# Patient Record
Sex: FEMALE | Race: BLACK OR AFRICAN AMERICAN | Employment: FULL TIME | URBAN - METROPOLITAN AREA
[De-identification: names, ages, dates, MRNs, and addresses within clinical notes are randomized per-mention and may not be internally consistent; named-entity substitution may affect disease eponyms.]

---

## 2017-02-23 ENCOUNTER — HOSPITAL ENCOUNTER (EMERGENCY)
Age: 42
Discharge: HOME | End: 2017-02-23
Attending: STUDENT IN AN ORGANIZED HEALTH CARE EDUCATION/TRAINING PROGRAM
Payer: COMMERCIAL

## 2017-02-23 VITALS
OXYGEN SATURATION: 99 % | TEMPERATURE: 98.2 F | HEART RATE: 99 BPM | RESPIRATION RATE: 18 BRPM | DIASTOLIC BLOOD PRESSURE: 75 MMHG | HEIGHT: 63 IN | SYSTOLIC BLOOD PRESSURE: 129 MMHG | BODY MASS INDEX: 19.14 KG/M2 | WEIGHT: 108 LBS

## 2017-02-23 DIAGNOSIS — K43.9 ABDOMINAL WALL HERNIA: Primary | ICD-10-CM

## 2017-02-23 PROCEDURE — 99282 EMERGENCY DEPT VISIT SF MDM: CPT

## 2017-02-24 NOTE — ED TRIAGE NOTES
Patient has had a mass growing just above her pubic bone for about a year. Saw her Doc when it was smaller, and was told not to worry too much about it as long as wasn't bothering her. Has now gotten bigger and more bothersome. Denies urinary symptoms, states has had some constipation.

## 2017-02-24 NOTE — ED NOTES
Dr. Reinier Fonseca at bedside for ultrasound of left lower abdominal/labia area; assisted by this nurse.

## 2017-02-24 NOTE — ED PROVIDER NOTES
HPI Comments: 39 y.o. female with past medical history significant for DM, GERD, Pneumothorax, Anemia, Exploratory lap for endometriosis who presents from home with chief complaint of mass to labia. Pt reports 1 year hx of edematous mass to left labia which has fluctuated in size. She notes being evaluated by OB/GYN, PCP and General surgery 1 year ago when size was minimal. She was advised to monitor symptoms. Pt states current symptoms more significant with moderate protrusion. She states symptoms can typically be reduced via manual palpation but not within the last couple of days. Pt denies any other acute medical concerns at this time. PCP: Burt Banda MD    Note written by Levy Munoz, as dictated by Travis Guzman MD 9:19 PM    The history is provided by the patient. No  was used. Past Medical History:   Diagnosis Date    Anemia     Anemia     Dysmenorrhea     GERD (gastroesophageal reflux disease)     Spontaneous pneumothorax 3/2013 and 8/2013    recurrent right       Past Surgical History:   Procedure Laterality Date    HX OTHER SURGICAL  8/21/2013    Bronchoscopy, RVATS and talc pleurodesis    HX OTHER SURGICAL  2009    ex lap, abdominal for endometriosis    HX OTHER SURGICAL  3/21/2013    Bronch, RVATS, apical bleb rsxn,parietal pleurectomy    HX OTHER SURGICAL  8/21/2013    Bronchoscopy, RVATS and talc pleurodesis         Family History:   Problem Relation Age of Onset    Hypertension Mother     Diabetes Father     Heart Disease Father        Social History     Social History    Marital status:      Spouse name: N/A    Number of children: N/A    Years of education: N/A     Occupational History    Not on file.      Social History Main Topics    Smoking status: Never Smoker    Smokeless tobacco: Not on file    Alcohol use Yes      Comment: rarely    Drug use: No    Sexual activity: Not on file     Other Topics Concern    Not on file     Social History Narrative         ALLERGIES: Review of patient's allergies indicates no known allergies. Review of Systems   Constitutional: Negative for activity change, diaphoresis, fatigue and fever. HENT: Negative for congestion and sore throat. Eyes: Negative for photophobia and visual disturbance. Respiratory: Negative for chest tightness and shortness of breath. Cardiovascular: Negative for chest pain, palpitations and leg swelling. Gastrointestinal: Negative for abdominal pain, blood in stool, constipation, diarrhea, nausea and vomiting. Genitourinary: Negative for difficulty urinating, dysuria, flank pain, frequency and hematuria.        + Mass to left labia   Musculoskeletal: Negative for back pain. Neurological: Negative for dizziness, syncope, numbness and headaches. All other systems reviewed and are negative. Vitals:    02/23/17 2046   BP: 129/75   Pulse: 99   Resp: 18   Temp: 98.2 °F (36.8 °C)   SpO2: 99%   Weight: 49 kg (108 lb)   Height: 5' 3\" (1.6 m)            Physical Exam   Constitutional: She is oriented to person, place, and time. She appears well-developed and well-nourished. No distress. HENT:   Head: Normocephalic and atraumatic. Nose: Nose normal.   Mouth/Throat: Oropharynx is clear and moist. No oropharyngeal exudate. Eyes: Conjunctivae and EOM are normal. Right eye exhibits no discharge. Left eye exhibits no discharge. No scleral icterus. Neck: Normal range of motion. Neck supple. No JVD present. No tracheal deviation present. No thyromegaly present. Cardiovascular: Normal rate, regular rhythm, normal heart sounds and intact distal pulses. Exam reveals no gallop and no friction rub. No murmur heard. Pulmonary/Chest: Effort normal and breath sounds normal. No stridor. No respiratory distress. She has no wheezes. She has no rales. She exhibits no tenderness. Abdominal: Bowel sounds are normal. She exhibits no distension and no mass. There is no tenderness. There is no rebound. Genitourinary:   Genitourinary Comments:   Bedside Ultrasound  Midline abdominal hernia extending into left labia, reducible   Musculoskeletal: Normal range of motion. She exhibits no edema or tenderness. Lymphadenopathy:     She has no cervical adenopathy. Neurological: She is alert and oriented to person, place, and time. No cranial nerve deficit. Coordination normal.   Skin: Skin is warm and dry. No rash noted. She is not diaphoretic. No erythema. No pallor. Psychiatric: She has a normal mood and affect. Her behavior is normal. Judgment and thought content normal.   Nursing note and vitals reviewed. Note written by Levy Gonzalez, as dictated by Aimee Santacruz MD 9:31 PM    MDM  Number of Diagnoses or Management Options  Abdominal wall hernia:   Diagnosis management comments: Abscess, hernia, labial swelling. 38 y/o female presenting to ED for left labial/pelvic swelling. Bedside US shows abd hernia. Will dc with surgery f/u.     Risk of Complications, Morbidity, and/or Mortality  Presenting problems: moderate  Diagnostic procedures: moderate  Management options: moderate    Patient Progress  Patient progress: stable    ED Course       Procedures

## 2017-02-24 NOTE — DISCHARGE INSTRUCTIONS
We hope that we have addressed all of your medical concerns. The examination and treatment you received in the Emergency Department were for an emergent problem and were not intended as complete care. It is important that you follow up with your healthcare provider(s) for ongoing care. If your symptoms worsen or do not improve as expected, and you are unable to reach your usual health care provider(s), you should return to the Emergency Department. Today's healthcare is undergoing tremendous change, and patient satisfaction surveys are one of the many tools to assess the quality of medical care. You may receive a survey from the [employer] regarding your experience in the Emergency Department. I hope that your experience has been completely positive, particularly the medical care that I provided. As such, please participate in the survey; anything less than excellent does not meet my expectations or intentions. [facility] and [address] [place] [address] participate in nationally recognized quality of care measures. If your blood pressure is greater than 120/80, as reported below, we urge that you seek medical care to address the potential of high blood pressure, commonly known as hypertension. Hypertension can be hereditary or can be caused by certain medical conditions, pain, stress, or \"white coat syndrome. \"       Please make an appointment with your health care provider(s) for follow up of your Emergency Department visit. VITALS:   Patient Vitals for the past 8 hrs:   Temp Pulse Resp BP SpO2   02/23/17 2046 98.2 °F (36.8 °C) 99 18 129/75 99 %          Thank you for allowing us to provide you with medical care today. We realize that you have many choices for your emergency care needs. Please choose us in the future for any continued health care needs. Mary Beth Metz, [address] [place] [address].   Office: [phone]            No results found for this or any previous visit (from the past 24 hour(s)). No results found. Hernia: Care Instructions  Your Care Instructions    A hernia develops when tissue bulges through a weak spot in the wall of your belly. The groin area and the navel are common areas for a hernia. A hernia can also develop near the area of a surgery you had before. Pressure from lifting, straining, or coughing can tear the weak area, causing the hernia to bulge and be painful. If you cannot push a hernia back into place, the tissue may become trapped outside the belly wall. If the hernia gets twisted and loses its blood supply, it will swell and die. This is called a strangulated hernia. It usually causes a lot of pain. It needs treatment right away. Some hernias need to be repaired to prevent a strangulated hernia. If your hernia causes symptoms or is large, you may need surgery. Follow-up care is a key part of your treatment and safety. Be sure to make and go to all appointments, and call your doctor if you are having problems. Its also a good idea to know your test results and keep a list of the medicines you take. How can you care for yourself at home? · Take care when lifting heavy objects. · Stay at a healthy weight. · Do not smoke. Smoking can cause coughing, which can cause your hernia to bulge. If you need help quitting, talk to your doctor about stop-smoking programs and medicines. These can increase your chances of quitting for good. · Talk with your doctor before wearing a corset or truss for a hernia. These devices are not recommended for treating hernias and sometimes can do more harm than good. There may be certain situations when your doctor thinks a truss would work, but these are rare. When should you call for help? Call your doctor now or seek immediate medical care if:  · You have severe belly pain. · You have nausea or vomiting.   · You have belly pain and are not passing gas or stool. · You cannot push the hernia back into place with gentle pressure when you are lying down. · The skin over the hernia turns red or becomes tender. Watch closely for changes in your health, and be sure to contact your doctor if:  · You have new or increased pain. · You do not get better as expected. Where can you learn more? Go to http://justyna-octavio.info/. Enter C129 in the search box to learn more about \"Hernia: Care Instructions. \"  Current as of: August 9, 2016  Content Version: 11.1  © 7594-4669 [employer]. Care instructions adapted under license by [employer] (which disclaims liability or warranty for this information). If you have questions about a medical condition or this instruction, always ask your healthcare professional. [address] any warranty or liability for your use of this information.

## 2017-03-02 ENCOUNTER — OFFICE VISIT (OUTPATIENT)
Dept: SURGERY | Age: 42
End: 2017-03-02

## 2017-03-02 VITALS
HEART RATE: 86 BPM | WEIGHT: 105 LBS | RESPIRATION RATE: 14 BRPM | OXYGEN SATURATION: 98 % | TEMPERATURE: 98.5 F | SYSTOLIC BLOOD PRESSURE: 99 MMHG | DIASTOLIC BLOOD PRESSURE: 69 MMHG | HEIGHT: 63 IN | BODY MASS INDEX: 18.61 KG/M2

## 2017-03-02 DIAGNOSIS — K40.90 LEFT INGUINAL HERNIA: Primary | ICD-10-CM

## 2017-03-02 RX ORDER — CITALOPRAM 10 MG/1
10 TABLET ORAL
COMMUNITY
End: 2018-09-26

## 2017-03-02 NOTE — PROGRESS NOTES
Surgery Consult    Subjective:     Darlin Sheldon is a 39 y.o.  female who was referred for evaluation of  left inguinal hernia. Symptoms were first noted 3 years ago. Hernia has always been small and asymptomatic. Starting two weeks ago, it has become large and painful. Pain is dull and rated as moderate in intensity. Lump is reducible. Patient does not have symptoms of chronic constipation, chronic cough, difficulty urinating. Patient does not have a history of previous hernia surgery. There are no active problems to display for this patient. Past Medical History:   Diagnosis Date    Anemia     Anemia     Dysmenorrhea     GERD (gastroesophageal reflux disease)     Spontaneous pneumothorax 3/2013 and 8/2013    recurrent right     Past Surgical History:   Procedure Laterality Date    HX OTHER SURGICAL  8/21/2013    Bronchoscopy, RVATS and talc pleurodesis    HX OTHER SURGICAL  2009    ex lap, abdominal for endometriosis    HX OTHER SURGICAL  3/21/2013    Bronch, RVATS, apical bleb rsxn,parietal pleurectomy    HX OTHER SURGICAL  8/21/2013    Bronchoscopy, RVATS and talc pleurodesis      Family History   Problem Relation Age of Onset    Hypertension Mother     Diabetes Father     Heart Disease Father       Social History   Substance Use Topics    Smoking status: Never Smoker    Smokeless tobacco: Not on file    Alcohol use Yes      Comment: rarely      Current Outpatient Prescriptions   Medication Sig    citalopram (CELEXA) 10 mg tablet Take  by mouth daily.  insulin detemir (LEVEMIR FLEXPEN) 100 unit/mL (3 mL) inpn 22 Units by SubCUTAneous route Daily (before breakfast).  ferrous sulfate 325 mg (65 mg iron) CpER Take 325 mg by mouth two (2) times a day.  tranexamic acid (LYSTEDA) 650 mg tab tablet Take 13,500 mg by mouth three (3) times daily as needed for Other (patient takes three times daily for 5 days during menses).     LORazepam (ATIVAN) 1 mg tablet Take 1 mg by mouth three (3) times daily as needed for Anxiety.  metFORMIN (GLUCOPHAGE) 500 mg tablet Take 1 Tab by mouth two (2) times daily (with meals). (Patient taking differently: Take 1,000 mg by mouth two (2) times daily (with meals). )     No current facility-administered medications for this visit. No Known Allergies     Review of Systems:  A comprehensive review of systems was negative except for that written in the History of Present Illness. Objective:     Blood pressure 99/69, pulse 86, temperature 98.5 °F (36.9 °C), temperature source Oral, resp. rate 14, height 5' 3\" (1.6 m), weight 105 lb (47.6 kg), last menstrual period 02/19/2017, SpO2 98 %. Physical Exam:  GENERAL: alert, cooperative, no distress, appears stated age, EYE: conjunctivae/corneas clear. , EOM's intact., LUNG: clear to auscultation bilaterally, HEART: regular rate and rhythm, S1, S2 normal, no murmur, click, rub or gallop, ABDOMEN: soft, non-tender. Bowel sounds normal. No masses,  no organomegaly, : large left inguinal hernia extending into the left external labia. It is reducible. No RIH. NEUROLOGIC: AOx3. Gait normal.  motor strength normal and symmetric.  sensation grossly intact. Assessment:     Left inguinal hernia, easily reducible. Plan:     1. Discussed possibility of incarceration, strangulation, enlargement in size over time, and the risk of emergency surgery in the face of strangulation. Also discussed the risk of surgery including recurrence which can be up to 50% in the case of incisional or complex hernias, use of prosthetic materials (mesh) and the increased risk of infection, postoperative infection and the possible need for reoperation and removal of mesh if used, possibility of postoperative small bowel obstruction or ileus, and the risks of general anesthetic. The patient understands the risks; any and all questions were answered to the patient's satisfaction.      2. Patient has elected to proceed with surgical treatment. Procedure will be scheduled. Signed By: Linda Simmons MD     March 3, 2017                   Surgery : laparoscopic left inguinal hernia repair with mesh, possible right, possible open    Length:  1 hours    Diagnosis :     ICD-10-CM ICD-9-CM   1.  Left inguinal hernia K40.90 550.90       Anesthesia : general anesthesia    Surgery Type: Outpatient    Position:  supine    Hospital : [facility]    Blood thinner NO

## 2017-03-02 NOTE — PROGRESS NOTES
1. Have you been to the ER, urgent care clinic since your last visit? Hospitalized since your last visit? yes, [facility] ED 2/23/17    2. Have you seen or consulted any other health care providers outside of the [address] [place] [place] [address] since your last visit? Include any pap smears or colon screening.  no

## 2017-03-09 ENCOUNTER — HOSPITAL ENCOUNTER (OUTPATIENT)
Dept: GENERAL RADIOLOGY | Age: 42
Discharge: HOME | End: 2017-03-09
Attending: SURGERY
Payer: COMMERCIAL

## 2017-03-09 ENCOUNTER — HOSPITAL ENCOUNTER (OUTPATIENT)
Dept: PREADMISSION TESTING | Age: 42
Discharge: HOME | End: 2017-03-09
Payer: COMMERCIAL

## 2017-03-09 VITALS
OXYGEN SATURATION: 100 % | DIASTOLIC BLOOD PRESSURE: 72 MMHG | BODY MASS INDEX: 19.26 KG/M2 | RESPIRATION RATE: 16 BRPM | SYSTOLIC BLOOD PRESSURE: 117 MMHG | HEART RATE: 87 BPM | HEIGHT: 63 IN | TEMPERATURE: 98 F | WEIGHT: 108.69 LBS

## 2017-03-09 LAB
ALBUMIN SERPL BCP-MCNC: 3.8 G/DL (ref 3.5–5)
ALBUMIN/GLOB SERPL: 1.1 {RATIO} (ref 1.1–2.2)
ALP SERPL-CCNC: 100 U/L (ref 45–117)
ALT SERPL-CCNC: 32 U/L (ref 12–78)
ANION GAP BLD CALC-SCNC: 7 MMOL/L (ref 5–15)
AST SERPL W P-5'-P-CCNC: 19 U/L (ref 15–37)
ATRIAL RATE: 70 BPM
BASOPHILS # BLD AUTO: 0.1 K/UL (ref 0–0.1)
BASOPHILS # BLD: 1 % (ref 0–1)
BILIRUB SERPL-MCNC: 0.2 MG/DL (ref 0.2–1)
BUN SERPL-MCNC: 10 MG/DL (ref 6–20)
BUN/CREAT SERPL: 20 (ref 12–20)
CALCIUM SERPL-MCNC: 9.4 MG/DL (ref 8.5–10.1)
CALCULATED P AXIS, ECG09: 59 DEGREES
CALCULATED R AXIS, ECG10: 60 DEGREES
CALCULATED T AXIS, ECG11: 23 DEGREES
CHLORIDE SERPL-SCNC: 102 MMOL/L (ref 97–108)
CO2 SERPL-SCNC: 29 MMOL/L (ref 21–32)
CREAT SERPL-MCNC: 0.51 MG/DL (ref 0.55–1.02)
DIAGNOSIS, 93000: NORMAL
EOSINOPHIL # BLD: 0.1 K/UL (ref 0–0.4)
EOSINOPHIL NFR BLD: 1 % (ref 0–7)
ERYTHROCYTE [DISTWIDTH] IN BLOOD BY AUTOMATED COUNT: 20.9 % (ref 11.5–14.5)
GLOBULIN SER CALC-MCNC: 3.6 G/DL (ref 2–4)
GLUCOSE SERPL-MCNC: 212 MG/DL (ref 65–100)
HCT VFR BLD AUTO: 35.5 % (ref 35–47)
HGB BLD-MCNC: 10 G/DL (ref 11.5–16)
LYMPHOCYTES # BLD AUTO: 21 % (ref 12–49)
LYMPHOCYTES # BLD: 1.3 K/UL (ref 0.8–3.5)
MCH RBC QN AUTO: 21.5 PG (ref 26–34)
MCHC RBC AUTO-ENTMCNC: 28.2 G/DL (ref 30–36.5)
MCV RBC AUTO: 76.2 FL (ref 80–99)
MONOCYTES # BLD: 0.4 K/UL (ref 0–1)
MONOCYTES NFR BLD AUTO: 6 % (ref 5–13)
NEUTS SEG # BLD: 4.3 K/UL (ref 1.8–8)
NEUTS SEG NFR BLD AUTO: 71 % (ref 32–75)
P-R INTERVAL, ECG05: 138 MS
PLATELET # BLD AUTO: 384 K/UL (ref 150–400)
POTASSIUM SERPL-SCNC: 4.2 MMOL/L (ref 3.5–5.1)
PROT SERPL-MCNC: 7.4 G/DL (ref 6.4–8.2)
Q-T INTERVAL, ECG07: 360 MS
QRS DURATION, ECG06: 74 MS
QTC CALCULATION (BEZET), ECG08: 388 MS
RBC # BLD AUTO: 4.66 M/UL (ref 3.8–5.2)
RBC MORPH BLD: ABNORMAL
SODIUM SERPL-SCNC: 138 MMOL/L (ref 136–145)
VENTRICULAR RATE, ECG03: 70 BPM
WBC # BLD AUTO: 6.2 K/UL (ref 3.6–11)

## 2017-03-09 PROCEDURE — 85025 COMPLETE CBC W/AUTO DIFF WBC: CPT | Performed by: SURGERY

## 2017-03-09 PROCEDURE — 71020 XR CHEST PA LAT: CPT

## 2017-03-09 PROCEDURE — 93005 ELECTROCARDIOGRAM TRACING: CPT

## 2017-03-09 PROCEDURE — 36415 COLL VENOUS BLD VENIPUNCTURE: CPT | Performed by: SURGERY

## 2017-03-09 PROCEDURE — 80053 COMPREHEN METABOLIC PANEL: CPT | Performed by: SURGERY

## 2017-03-09 RX ORDER — INSULIN ASPART 100 [IU]/ML
INJECTION, SOLUTION INTRAVENOUS; SUBCUTANEOUS
COMMUNITY

## 2017-03-09 RX ORDER — OMEPRAZOLE 20 MG/1
20 CAPSULE, DELAYED RELEASE ORAL
COMMUNITY

## 2017-03-09 RX ORDER — INSULIN DEGLUDEC 100 U/ML
32 INJECTION, SOLUTION SUBCUTANEOUS
COMMUNITY

## 2017-03-09 NOTE — PERIOP NOTES
[facility]  PREOPERATIVE INSTRUCTIONS    Surgery Date:   3/15/2017  Surgery arrival time given by surgeon: NO   If no,[facility] [address] staff will call you between 4 PM- 8 PM the day before surgery with your arrival time. If your surgery is on a Monday, we will call you the preceding Friday. Please call [phone] after 8 PM if you did not receive your arrival time. 1. Please report at the designated time to the [facility] [address] [facility]. Bring your insurance card, photo identification, and any copayment ( if applicable). 2. You must have a responsible adult to drive you home. You need to have a responsible adult to stay with you the first 24 hours after surgery if you are going home the same day of your surgery and you should not drive a car for 24 hours following your surgery. 3. Nothing to eat or drink after midnight the night before surgery. This includes no water, gum, mints, coffee, juice, etc.  Please note special instructions, if applicable, below for medications. 4. MEDICATIONS TO TAKE THE MORNING OF SURGERY WITH A SIP OF WATER: Take citalopram and omeprazole, NO meds for diabetes  5. No alcoholic beverages 24 hours before or after your surgery. 6. If you are being admitted to the hospital,please leave personal belongings/luggage in your car until you have an assigned hospital room number. 7. Stop Aspirin and/or any non-steroidal anti-inflammatory drugs (i.e. Ibuprofen, Naproxen, Advil, Aleve) as directed by your surgeon. You may take Tylenol. Stop herbal supplements 1 week prior to  surgery. 8. If you are currently taking Plavix, Coumadin,or any other blood-thinning/anticoagulant medication contact your surgeon for instructions. 9. Please wear comfortable clothes. Wear your glasses instead of contacts. We ask that all money, jewelry and valuables be left at home. Wear no make up, particularly mascara, the day of surgery.    10.  All body piercings, rings,and jewelry need to be removed and left at home.    Please wear your hair loose or down. Please no pony-tails, buns, or any metal hair accessories. If you shower the morning of surgery, please do not apply any lotions, powders, or deodorants afterwards. Do not shave any body area within 24 hours of your surgery. 11. Please follow all instructions to avoid any potential surgical cancellation. 12.  Should your physical condition change, (i.e. fever, cold, flu, etc.) please notify your surgeon as soon as possible. 13. It is important to be on time. If a situation occurs where you may be delayed, please call:  [phone] / [phone] on the day of surgery. 14. The [facility] staff can be reached at 21 [phone]. .  15. Special instructions: none    The patient was contacted  in person. She  verbalize  understanding of all instructions does not  need reinforcement.

## 2017-03-10 ENCOUNTER — TELEPHONE (OUTPATIENT)
Dept: SURGERY | Age: 42
End: 2017-03-10

## 2017-03-10 NOTE — TELEPHONE ENCOUNTER
Scheduled appointment for patient on Tuesday, March 14 @ 10am with Dr Efrem Almonte in [address] patient received all info needed for appointment did not voice any other concerns

## 2017-03-10 NOTE — TELEPHONE ENCOUNTER
Called patient to explain to her she needs cardiac clearance to have surgery.  Left message on voicemail

## 2017-03-14 ENCOUNTER — ANESTHESIA EVENT (OUTPATIENT)
Dept: SURGERY | Age: 42
End: 2017-03-14
Payer: COMMERCIAL

## 2017-03-15 ENCOUNTER — HOSPITAL ENCOUNTER (OUTPATIENT)
Age: 42
Setting detail: OUTPATIENT SURGERY
Discharge: HOME | End: 2017-03-15
Attending: SURGERY | Admitting: SURGERY
Payer: COMMERCIAL

## 2017-03-15 ENCOUNTER — SURGERY (OUTPATIENT)
Age: 42
End: 2017-03-15

## 2017-03-15 ENCOUNTER — ANESTHESIA (OUTPATIENT)
Dept: SURGERY | Age: 42
End: 2017-03-15
Payer: COMMERCIAL

## 2017-03-15 VITALS
OXYGEN SATURATION: 99 % | SYSTOLIC BLOOD PRESSURE: 123 MMHG | TEMPERATURE: 98.2 F | HEART RATE: 94 BPM | WEIGHT: 108.69 LBS | HEIGHT: 63 IN | DIASTOLIC BLOOD PRESSURE: 77 MMHG | BODY MASS INDEX: 19.26 KG/M2 | RESPIRATION RATE: 14 BRPM

## 2017-03-15 LAB
GLUCOSE BLD STRIP.AUTO-MCNC: 78 MG/DL (ref 65–100)
GLUCOSE BLD STRIP.AUTO-MCNC: 81 MG/DL (ref 65–100)
GLUCOSE BLD STRIP.AUTO-MCNC: 85 MG/DL (ref 65–100)
HCG UR QL: NEGATIVE
SERVICE CMNT-IMP: NORMAL

## 2017-03-15 PROCEDURE — 77030002895 HC DEV VASC CLOSR COVD -B: Performed by: SURGERY

## 2017-03-15 PROCEDURE — 76060000034 HC ANESTHESIA 1.5 TO 2 HR: Performed by: SURGERY

## 2017-03-15 PROCEDURE — 77030018836 HC SOL IRR NACL ICUM -A: Performed by: SURGERY

## 2017-03-15 PROCEDURE — 77030020747 HC TU INSUF ENDOSC TELE -A: Performed by: SURGERY

## 2017-03-15 PROCEDURE — 77030031139 HC SUT VCRL2 J&J -A: Performed by: SURGERY

## 2017-03-15 PROCEDURE — 77030020782 HC GWN BAIR PAWS FLX 3M -B

## 2017-03-15 PROCEDURE — 74011250636 HC RX REV CODE- 250/636: Performed by: SURGERY

## 2017-03-15 PROCEDURE — 77030037032 HC INSRT SCIS CLICKLLINE DISP STOR -B: Performed by: SURGERY

## 2017-03-15 PROCEDURE — 77030019908 HC STETH ESOPH SIMS -A: Performed by: ANESTHESIOLOGY

## 2017-03-15 PROCEDURE — 77030011640 HC PAD GRND REM COVD -A: Performed by: SURGERY

## 2017-03-15 PROCEDURE — 77030034850: Performed by: SURGERY

## 2017-03-15 PROCEDURE — 74011250636 HC RX REV CODE- 250/636

## 2017-03-15 PROCEDURE — 77030018684: Performed by: SURGERY

## 2017-03-15 PROCEDURE — 77030035043 HC TRCR ENDOSC OPTCL BLDLSS COVD -B: Performed by: SURGERY

## 2017-03-15 PROCEDURE — 77030013079 HC BLNKT BAIR HGGR 3M -A: Performed by: ANESTHESIOLOGY

## 2017-03-15 PROCEDURE — 74011000250 HC RX REV CODE- 250

## 2017-03-15 PROCEDURE — 76010000153 HC OR TIME 1.5 TO 2 HR: Performed by: SURGERY

## 2017-03-15 PROCEDURE — 77030008684 HC TU ET CUF COVD -B: Performed by: ANESTHESIOLOGY

## 2017-03-15 PROCEDURE — 76210000021 HC REC RM PH II 0.5 TO 1 HR: Performed by: SURGERY

## 2017-03-15 PROCEDURE — 77030026438 HC STYL ET INTUB CARD -A: Performed by: ANESTHESIOLOGY

## 2017-03-15 PROCEDURE — 77030032490 HC SLV COMPR SCD KNE COVD -B: Performed by: SURGERY

## 2017-03-15 PROCEDURE — 81025 URINE PREGNANCY TEST: CPT

## 2017-03-15 PROCEDURE — 82962 GLUCOSE BLOOD TEST: CPT

## 2017-03-15 PROCEDURE — 74011000250 HC RX REV CODE- 250: Performed by: SURGERY

## 2017-03-15 PROCEDURE — 77030002933 HC SUT MCRYL J&J -A: Performed by: SURGERY

## 2017-03-15 PROCEDURE — 74011250636 HC RX REV CODE- 250/636: Performed by: ANESTHESIOLOGY

## 2017-03-15 PROCEDURE — 76210000006 HC OR PH I REC 0.5 TO 1 HR: Performed by: SURGERY

## 2017-03-15 PROCEDURE — 77030010507 HC ADH SKN DERMBND J&J -B: Performed by: SURGERY

## 2017-03-15 PROCEDURE — C1781 MESH (IMPLANTABLE): HCPCS | Performed by: SURGERY

## 2017-03-15 DEVICE — MESH SURG W4XL6IN ELLIPSE SEPRA TECHNOLOGY VENTRALIGHT: Type: IMPLANTABLE DEVICE | Site: GROIN | Status: FUNCTIONAL

## 2017-03-15 RX ORDER — LIDOCAINE HYDROCHLORIDE 10 MG/ML
0.1 INJECTION, SOLUTION EPIDURAL; INFILTRATION; INTRACAUDAL; PERINEURAL AS NEEDED
Status: DISCONTINUED | OUTPATIENT
Start: 2017-03-15 | End: 2017-03-15 | Stop reason: HOSPADM

## 2017-03-15 RX ORDER — SODIUM CHLORIDE, SODIUM LACTATE, POTASSIUM CHLORIDE, CALCIUM CHLORIDE 600; 310; 30; 20 MG/100ML; MG/100ML; MG/100ML; MG/100ML
INJECTION, SOLUTION INTRAVENOUS
Status: DISCONTINUED | OUTPATIENT
Start: 2017-03-15 | End: 2017-03-15 | Stop reason: HOSPADM

## 2017-03-15 RX ORDER — SODIUM CHLORIDE 0.9 % (FLUSH) 0.9 %
5-10 SYRINGE (ML) INJECTION AS NEEDED
Status: DISCONTINUED | OUTPATIENT
Start: 2017-03-15 | End: 2017-03-15 | Stop reason: HOSPADM

## 2017-03-15 RX ORDER — HYDROMORPHONE HYDROCHLORIDE 1 MG/ML
.25-1 INJECTION, SOLUTION INTRAMUSCULAR; INTRAVENOUS; SUBCUTANEOUS
Status: DISCONTINUED | OUTPATIENT
Start: 2017-03-15 | End: 2017-03-15 | Stop reason: HOSPADM

## 2017-03-15 RX ORDER — SODIUM CHLORIDE, SODIUM LACTATE, POTASSIUM CHLORIDE, CALCIUM CHLORIDE 600; 310; 30; 20 MG/100ML; MG/100ML; MG/100ML; MG/100ML
100 INJECTION, SOLUTION INTRAVENOUS CONTINUOUS
Status: DISCONTINUED | OUTPATIENT
Start: 2017-03-15 | End: 2017-03-15 | Stop reason: HOSPADM

## 2017-03-15 RX ORDER — OXYCODONE AND ACETAMINOPHEN 5; 325 MG/1; MG/1
TABLET ORAL
Qty: 45 TAB | Refills: 0 | Status: SHIPPED | OUTPATIENT
Start: 2017-03-15 | End: 2019-01-10

## 2017-03-15 RX ORDER — LIDOCAINE HYDROCHLORIDE 20 MG/ML
INJECTION, SOLUTION EPIDURAL; INFILTRATION; INTRACAUDAL; PERINEURAL AS NEEDED
Status: DISCONTINUED | OUTPATIENT
Start: 2017-03-15 | End: 2017-03-15 | Stop reason: HOSPADM

## 2017-03-15 RX ORDER — GLYCOPYRROLATE 0.2 MG/ML
INJECTION INTRAMUSCULAR; INTRAVENOUS AS NEEDED
Status: DISCONTINUED | OUTPATIENT
Start: 2017-03-15 | End: 2017-03-15 | Stop reason: HOSPADM

## 2017-03-15 RX ORDER — ROCURONIUM BROMIDE 10 MG/ML
INJECTION, SOLUTION INTRAVENOUS AS NEEDED
Status: DISCONTINUED | OUTPATIENT
Start: 2017-03-15 | End: 2017-03-15 | Stop reason: HOSPADM

## 2017-03-15 RX ORDER — SODIUM CHLORIDE 0.9 % (FLUSH) 0.9 %
5-10 SYRINGE (ML) INJECTION EVERY 8 HOURS
Status: DISCONTINUED | OUTPATIENT
Start: 2017-03-15 | End: 2017-03-15 | Stop reason: HOSPADM

## 2017-03-15 RX ORDER — PROPOFOL 10 MG/ML
INJECTION, EMULSION INTRAVENOUS AS NEEDED
Status: DISCONTINUED | OUTPATIENT
Start: 2017-03-15 | End: 2017-03-15 | Stop reason: HOSPADM

## 2017-03-15 RX ORDER — MIDAZOLAM HYDROCHLORIDE 1 MG/ML
INJECTION, SOLUTION INTRAMUSCULAR; INTRAVENOUS AS NEEDED
Status: DISCONTINUED | OUTPATIENT
Start: 2017-03-15 | End: 2017-03-15 | Stop reason: HOSPADM

## 2017-03-15 RX ORDER — NEOSTIGMINE METHYLSULFATE 1 MG/ML
INJECTION INTRAVENOUS AS NEEDED
Status: DISCONTINUED | OUTPATIENT
Start: 2017-03-15 | End: 2017-03-15 | Stop reason: HOSPADM

## 2017-03-15 RX ORDER — CEFAZOLIN SODIUM IN 0.9 % NACL 2 G/50 ML
2 INTRAVENOUS SOLUTION, PIGGYBACK (ML) INTRAVENOUS
Status: COMPLETED | OUTPATIENT
Start: 2017-03-15 | End: 2017-03-15

## 2017-03-15 RX ORDER — BUPIVACAINE HYDROCHLORIDE AND EPINEPHRINE 5; 5 MG/ML; UG/ML
INJECTION, SOLUTION EPIDURAL; INTRACAUDAL; PERINEURAL AS NEEDED
Status: DISCONTINUED | OUTPATIENT
Start: 2017-03-15 | End: 2017-03-15 | Stop reason: HOSPADM

## 2017-03-15 RX ORDER — FENTANYL CITRATE 50 UG/ML
INJECTION, SOLUTION INTRAMUSCULAR; INTRAVENOUS AS NEEDED
Status: DISCONTINUED | OUTPATIENT
Start: 2017-03-15 | End: 2017-03-15 | Stop reason: HOSPADM

## 2017-03-15 RX ORDER — ONDANSETRON 2 MG/ML
INJECTION INTRAMUSCULAR; INTRAVENOUS AS NEEDED
Status: DISCONTINUED | OUTPATIENT
Start: 2017-03-15 | End: 2017-03-15 | Stop reason: HOSPADM

## 2017-03-15 RX ORDER — SUCCINYLCHOLINE CHLORIDE 20 MG/ML
INJECTION INTRAMUSCULAR; INTRAVENOUS AS NEEDED
Status: DISCONTINUED | OUTPATIENT
Start: 2017-03-15 | End: 2017-03-15 | Stop reason: HOSPADM

## 2017-03-15 RX ORDER — CEFAZOLIN SODIUM IN 0.9 % NACL 2 G/50 ML
2 INTRAVENOUS SOLUTION, PIGGYBACK (ML) INTRAVENOUS ONCE
Status: DISCONTINUED | OUTPATIENT
Start: 2017-03-15 | End: 2017-03-15

## 2017-03-15 RX ADMIN — FENTANYL CITRATE 100 MCG: 50 INJECTION, SOLUTION INTRAMUSCULAR; INTRAVENOUS at 11:24

## 2017-03-15 RX ADMIN — BUPIVACAINE HYDROCHLORIDE AND EPINEPHRINE 7 ML: 5; 5 INJECTION, SOLUTION EPIDURAL; INTRACAUDAL; PERINEURAL at 12:38

## 2017-03-15 RX ADMIN — PROPOFOL 130 MG: 10 INJECTION, EMULSION INTRAVENOUS at 11:24

## 2017-03-15 RX ADMIN — ROCURONIUM BROMIDE 20 MG: 10 INJECTION, SOLUTION INTRAVENOUS at 11:31

## 2017-03-15 RX ADMIN — ROCURONIUM BROMIDE 10 MG: 10 INJECTION, SOLUTION INTRAVENOUS at 11:53

## 2017-03-15 RX ADMIN — NEOSTIGMINE METHYLSULFATE 2 MG: 1 INJECTION INTRAVENOUS at 12:41

## 2017-03-15 RX ADMIN — SUCCINYLCHOLINE CHLORIDE 60 MG: 20 INJECTION INTRAMUSCULAR; INTRAVENOUS at 11:24

## 2017-03-15 RX ADMIN — LIDOCAINE HYDROCHLORIDE 40 MG: 20 INJECTION, SOLUTION EPIDURAL; INFILTRATION; INTRACAUDAL; PERINEURAL at 11:24

## 2017-03-15 RX ADMIN — FENTANYL CITRATE 50 MCG: 50 INJECTION, SOLUTION INTRAMUSCULAR; INTRAVENOUS at 11:20

## 2017-03-15 RX ADMIN — ONDANSETRON 4 MG: 2 INJECTION INTRAMUSCULAR; INTRAVENOUS at 12:41

## 2017-03-15 RX ADMIN — SODIUM CHLORIDE, SODIUM LACTATE, POTASSIUM CHLORIDE, AND CALCIUM CHLORIDE 100 ML/HR: 600; 310; 30; 20 INJECTION, SOLUTION INTRAVENOUS at 09:48

## 2017-03-15 RX ADMIN — GLYCOPYRROLATE 0.3 MG: 0.2 INJECTION INTRAMUSCULAR; INTRAVENOUS at 12:41

## 2017-03-15 RX ADMIN — MIDAZOLAM HYDROCHLORIDE 2 MG: 1 INJECTION, SOLUTION INTRAMUSCULAR; INTRAVENOUS at 11:20

## 2017-03-15 RX ADMIN — SODIUM CHLORIDE, SODIUM LACTATE, POTASSIUM CHLORIDE, CALCIUM CHLORIDE: 600; 310; 30; 20 INJECTION, SOLUTION INTRAVENOUS at 11:27

## 2017-03-15 RX ADMIN — CEFAZOLIN 2 G: 1 INJECTION, POWDER, FOR SOLUTION INTRAMUSCULAR; INTRAVENOUS; PARENTERAL at 11:25

## 2017-03-15 RX ADMIN — ROCURONIUM BROMIDE 5 MG: 10 INJECTION, SOLUTION INTRAVENOUS at 11:24

## 2017-03-15 RX ADMIN — ROCURONIUM BROMIDE 10 MG: 10 INJECTION, SOLUTION INTRAVENOUS at 12:15

## 2017-03-15 NOTE — ANESTHESIA PREPROCEDURE EVALUATION
Anesthetic History   No history of anesthetic complications            Review of Systems / Medical History  Patient summary reviewed, nursing notes reviewed and pertinent labs reviewed    Pulmonary  Within defined limits                 Neuro/Psych   Within defined limits           Cardiovascular  Within defined limits                     GI/Hepatic/Renal     GERD           Endo/Other    Diabetes         Other Findings              Physical Exam    Airway  Mallampati: II  TM Distance: 4 - 6 cm  Neck ROM: normal range of motion   Mouth opening: Normal     Cardiovascular    Rhythm: regular  Rate: normal         Dental  No notable dental hx       Pulmonary  Breath sounds clear to auscultation               Abdominal         Other Findings            Anesthetic Plan    ASA: 2  Anesthesia type: MAC            Anesthetic plan and risks discussed with: Patient

## 2017-03-15 NOTE — DISCHARGE INSTRUCTIONS
Hernia Repair: What to Expect at [address] are likely to have pain for the next few days. You may also feel like you have the flu, and you may have a low fever and feel tired and nauseated. This is common. You should feel better after a few days and will probably feel much better in 7 days. For several weeks you may feel twinges or pulling in the hernia repair when you move. You may have some bruising on the scrotum and along the penis. This is normal. Men will need to wear a jockstrap or briefs, not boxers, for scrotal support for several days after a groin (inguinal) hernia repair. [employer] bicycle shorts may provide good support. This care sheet gives you a general idea about how long it will take for you to recover. But each person recovers at a different pace. Follow the steps below to get better as quickly as possible. How can you care for yourself at home? Activity  · Rest when you feel tired. Getting enough sleep will help you recover. · Try to walk each day. Start by walking a little more than you did the day before. Bit by bit, increase the amount you walk. Walking boosts blood flow and helps prevent pneumonia and constipation. · Avoid strenuous activities, such as biking, jogging, weight lifting, or aerobic exercise, until your doctor says it is okay. · Avoid lifting anything that would make you strain. This may include heavy grocery bags and milk containers, a heavy briefcase or backpack, cat litter or dog food bags, a vacuum , or a child. · You may drive when you are no longer taking pain medicine and can quickly move your foot from the gas pedal to the brake. You must also be able to sit comfortably for a long period of time, even if you do not plan to go far. You might get caught in traffic. · Most people are able to return to work within 1 to 2 weeks after surgery. · You may shower 24 to 48 hours after surgery, if your doctor okays it. Pat the cut (incision) dry. Do not take a bath for the first 2 weeks, or until your doctor tells you it is okay. · Your doctor will tell you when you can have sex again. Diet  · You can eat your normal diet. If your stomach is upset, try bland, low-fat foods like plain rice, broiled chicken, toast, and yogurt. · Drink plenty of fluids (unless your doctor tells you not to). · You may notice that your bowel movements are not regular right after your surgery. This is common. Avoid constipation and straining with bowel movements. You may want to take a fiber supplement every day. If you have not had a bowel movement after a couple of days, ask your doctor about taking a mild laxative. Medicines  · Your doctor will tell you if and when you can restart your medicines. He or she will also give you instructions about taking any new medicines. · If you take blood thinners, such as warfarin (Coumadin), clopidogrel (Plavix), or aspirin, be sure to talk to your doctor. He or she will tell you if and when to start taking those medicines again. Make sure that you understand exactly what your doctor wants you to do. · Be safe with medicines. Take pain medicines exactly as directed. ¨ If the doctor gave you a prescription medicine for pain, take it as prescribed. ¨ If you are not taking a prescription pain medicine, take an over-the-counter medicine such as acetaminophen (Tylenol), ibuprofen (Advil, Motrin), or naproxen (Aleve). Read and follow all instructions on the label. ¨ Do not take two or more pain medicines at the same time unless the doctor told you to. Many pain medicines have acetaminophen, which is Tylenol. Too much acetaminophen (Tylenol) can be harmful. · If your doctor prescribed antibiotics, take them as directed. Do not stop taking them just because you feel better. You need to take the full course of antibiotics.   · If you think your pain medicine is making you sick to your stomach:  ¨ Take your medicine after meals (unless your doctor has told you not to). ¨ Ask your doctor for a different pain medicine. Incision care  · If you have strips of tape on the cut (incision) the doctor made, leave the tape on for a week or until it falls off. · If you have staples closing the cut, you will need to visit your doctor in 1 to 2 weeks to have them removed. · Wash the area daily with warm, soapy water and pat it dry. Follow-up care is a key part of your treatment and safety. Be sure to make and go to all appointments, and call your doctor if you are having problems. It's also a good idea to know your test results and keep a list of the medicines you take. When should you call for help? Call 911 anytime you think you may need emergency care. For example, call if:  · You passed out (lost consciousness). · You have sudden chest pain and shortness of breath, or you cough up blood. · You have severe pain in your belly. Call your doctor now or seek immediate medical care if:  · You are sick to your stomach and cannot keep fluids down. · You have signs of a blood clot, such as:  ¨ Pain in your calf, back of the knee, thigh, or groin. ¨ Redness and swelling in your leg or groin. · You have trouble passing urine or stool, especially if you have mild pain or swelling in your lower belly. · Bright red blood has soaked through the bandage over your incision. Watch closely for any changes in your health, and be sure to contact your doctor if:  · Your swelling is getting worse. · Your swelling is not going down. Where can you learn more? Go to http://justyna-octavio.info/. Enter F288 in the search box to learn more about \"Hernia Repair: What to Expect at Home. \"  Current as of: August 9, 2016  Content Version: 11.1  © 1602-3173 [employer]. Care instructions adapted under license by [employer] (which disclaims liability or warranty for this information).  If you have questions about a medical condition or this instruction, always ask your healthcare professional. [place] [address] any warranty or liability for your use of this information. Sissy Ogden MD, PhD, FACS  General Surgery at [address] [place] [address], [facility] [address]  [phone]  Fax [phone]    DISCHARGE SUMMARY from your Nurse    The following personal items collected during your admission are returned to you:   Dental Appliance: Dental Appliances: None  Vision: Visual Aid: Glasses  Hearing Aid:    Jewelry: Jewelry: Earrings, Liban Hoist  Clothing: Clothing: Footwear, Jacket/Coat, Pants, Shirt, Socks, Undergarments  Other Valuables: Other Valuables: Eyeglasses (placed inpateint belonging bag)  Valuables sent to safe:      PATIENT INSTRUCTIONS:    After general anesthesia or intravenous sedation, for 24 hours or while taking prescription Narcotics:  · Limit your activities  · Do not drive and operate hazardous machinery  · Do not make important personal or business decisions  · Do  not drink alcoholic beverages  · If you have not urinated within 8 hours after discharge, please contact your surgeon on call. Report the following to your surgeon:  · Excessive pain, swelling, redness or odor of or around the surgical area  · Temperature over 100.5  · Nausea and vomiting lasting longer than 4 hours or if unable to take medications  · Any signs of decreased circulation or nerve impairment to extremity: change in color, persistent  numbness, tingling, coldness or increase pain  · Any questions    COUGH AND DEEP BREATHE    Breathing deep and coughing are very important exercises to do after surgery. Deep breathing and coughing open the little air tubes and air sacks in your lungs. You take deep breaths every day. You may not even notice - it is just something you do when you sigh or yawn. It is a natural exercise you do to keep these air passages open.      After surgery, take deep breaths and cough, on purpose. Coughing and deep breathing help prevent bronchitis and pneumonia after surgery. If you had chest or belly surgery, use a pillow as a \"hug riddhi\" and hold it tightly to your chest or belly when you cough. DIRECTIONS:  6. Take 10 to 15 slow deep breaths every hour while awake. 7. Breathe in deeply, and hold it for 2 seconds. 8. Exhale slowly through puckered lips, like blowing up a balloon. 9. After every 4th or 5th deep breath, hug your pillow to your chest or belly and give a hard, deep cough. Yes, it will probably hurt. But doing this exercise is very important part of healing after surgery. Take your pain medicine to help you do this exercise without too much pain. IF YOU HAVE BEEN DIAGNOSED WITH SLEEP APNEA, PLEASE USE YOUR SLEEIFP APNEA DEVICE OR CPAP MACHINE WHEN YOU INTEND TO NAP AFTER TAKING PAIN MEDICATION. Ankle Pumps    Ankle pumps increase the circulation of oxygenated blood to your lower extremities and decrease your risk for circulation problems such as blood clots. They also stretch the muscles, tendons and ligaments in your foot and ankle, and prevent joint contracture in the ankle and foot, especially after surgeries on the legs. It is important to do ankle pump exercises regularly after surgery because immobility increases your risk for developing a blood clot. Your doctor may also have you take an Aspirin for the next few days as well. If your doctor did not ask you to take an Aspirin, consult with him before starting Aspirin therapy on your own. Slowly point your foot forward, feeling the muscles on the top of your lower leg stretch, and hold this position for 5 seconds. Next, pull your foot back toward you as far as possible, stretching the calf muscles, and hold that position for 5 seconds. Repeat with the other foot.   Perform 10 repetitions every hour while awake for both ankles if possible (down and then up with the foot once is one repetition). You should feel gentle stretching of the muscles in your lower leg when doing this exercise. If you feel pain, or your range of motion is limited, don't  Push too hard. Only go the limit your joint and muscles will let you go. If you have increasing pain, progressively worsening leg warmth or swelling, STOP the exercise and call your doctor. Below is information about the medications your doctor is prescribing after your visit:    Other information in your discharge envelope:  []     PRESCRIPTIONS  []     PHYSICAL THERAPY PRESCRIPTION  []     APPOINTMENT CARDS  []     Regional Anesthesia Pamphlet for block or block with On-Q Catheter from Anesthesia Service  []     Medical device information sheets/pamphlets from their    []     School/work excuse note. []     /parent work excuse note. These are general instructions for a healthy lifestyle:    *  Please give a list of your current medications to your Primary Care Provider. *  Please update this list whenever your medications are discontinued, doses are      changed, or new medications (including over-the-counter products) are added. *  Please carry medication information at all times in case of emergency situations. About Smoking  No smoking / No tobacco products / Avoid exposure to second hand smoke    Surgeon General's Warning:  Quitting smoking now greatly reduces serious risk to your health. Obesity, smoking, and sedentary lifestyle greatly increases your risk for illness and disease. A healthy diet, regular physical exercise & weight monitoring are important for maintaining a healthy lifestyle.     Congestive Heart Failure  You may be retaining fluid if you have a history of heart failure or if you experience any of the following symptoms:  Weight gain of 3 pounds or more overnight or 5 pounds in a week, increased swelling in our hands or feet or shortness of breath while lying flat in bed. Please call your doctor as soon as you notice any of these symptoms; do not wait until your next office visit. Recognize signs and symptoms of STROKE:  F - face looks uneven  A - arms unable to move or move even  S - speech slurred or non-existent  T - time-call 911 as soon as signs and symptoms begin-DO NOT go         Back to bed or wait to see if you get better-TIME IS BRAIN. Warning signs of HEART ATTACK  Call 911 if you have these symptoms    · Chest discomfort. Most heart attacks involve discomfort in the center of the chest that lasts more than a few minutes, or that goes away and comes back. It can feel like uncomfortable pressure, squeezing, fullness, or pain. · Discomfort in other areas of the upper body. Symptoms can include pain or discomfort in one or both        Arms, the back, neck, jaw, or stomach. ·  Shortness of breath with or without chest discomfort. · Other signs may include breaking out in a cold sweat, nausea, or lightheadedness    Don't wait more than five minutes to call 911 - MINUTES MATTER! Fast action can save your life. Calling 911 is almost always the fastest way to get lifesaving treatment. Emergency Medical Services staff can begin treatment when they arrive - up to an hour sooner than if someone gets to the hospital by car. FARZAD RAO MEDICATION AND SIDE EFFECT GUIDE    The Romayne Duster MEDICATION AND SIDE EFFECT GUIDE was provided to the PATIENT AND CARE PROVIDER.   Information provided includes instruction about drug purpose and common side effects for the following medications:    · PERCOCET

## 2017-03-15 NOTE — INTERVAL H&P NOTE
H&P Update:  Paulo Fields was seen and examined. History and physical has been reviewed. The patient has been examined.  There have been no significant clinical changes since the completion of the originally dated History and Physical.    Signed By: Tony Grissom MD     March 15, 2017 10:54 AM

## 2017-03-15 NOTE — OP NOTES
[facility]   [address], [address]   OP NOTE       Name:  Monico Paulino   MR#:  [id number]   :  1975   Account #:  [de-identified]    Surgery Date:  03/15/2017   Date of Adm:  03/15/2017       PREOPERATIVE DIAGNOSIS: Left inguinal hernia. POSTOPERATIVE DIAGNOSES   1. Left inguinal hernia. 2. Hemorrhagic cyst of the right ovary. SURGEON: Hilton Hicks MD    ANESTHESIA: General endotracheal.    ESTIMATED BLOOD LOSS: Minimal.    TUBES AND DRAINS: None. SPECIMENS REMOVED: None. COMPLICATIONS: None apparent. PROCEDURE PERFORMED    1. Control of bleeding from right ovarian hemorrhagic cyst.   2. Laparoscopic left inguinal hernia repair. INTRAOPERATIVE CONSULTATION: Shanelle Culver MD from   OB/GYN. FINDINGS   1. 200 mL of a maroon thin fluid throughout the abdomen. 2. Active bleeding from any cyst off the right ovary. 3. Indirect left inguinal hernia. INDICATIONS FOR PROCEDURE: The patient is a 42-year-old   female who has had a progressively enlarging bulge in her left groin. She has been seen by multiple providers with an unclear diagnosis on   exam. She does have what appears to be a left inguinal hernia repair so   she presents for repair. DESCRIPTION OF PROCEDURE: The patient was identified in the   preoperative holding area. Informed consent obtained. She was given   preoperative antibiotics. She was then taken to the operating room,   placed in the supine position, underwent general endotracheal   anesthesia without complication. Her arms were then tucked and all   pressure points padded. Ramsey catheter was inserted under sterile   technique. A time-out was performed to confirm that the patient was   the patient and that she was presenting for left inguinal hernia repair.    Once this was confirmed, entry into the abdomen was obtained in the   midclavicular line on the right-hand side as well as the umbilicus. At   this site, 3 mL of 0.5% Marcaine were injected in subcutaneous space. A 5-mm incision made and then a 5-mm Xcel trocar containing a 5-mm   0-degree laparoscope was used to dissect through the layers of the   abdominal wall under direct laparoscopic vision. Entry into the   abdomen was confirmed visually. Once within the abdomen,   insufflation was provided through this trocar to a pressure of 15 mmHg. The patient tolerated insufflation well and there were no apparent   complications. A 360-degree evaluation of the abdomen was then   performed from this trocar site. There was a moderate amount of dark   maroon fluid throughout the abdomen. Attention was focused in the left   mid abdomen and a 5-mm trocar inserted. An additional 5-mm trocar   was placed at the umbilicus and a fourth 5-mm trocar was placed in   the left upper quadrant. The camera was switched to the umbilical   trocar site and a thorough examination of the abdomen was performed   to identify the source of the bloody fluid. The original trocar site   showed no signs of bleeding. The cecum was identified and elevated   towards the anterior abdominal wall and the entire serosal surface of   the cecum ascending colon and transverse colon examined and there   were no signs of any injuries. The right colon mesentery and was   examined. There were no signs of any injury or bleeding source. The   small bowel was then run in a hand-over-hand direction starting from   the ileocecal valve and heading in a retrograde direction. There were   no signs of any serosal injuries or mesenteric injuries. Attention was   then focused in the pelvis. The fluid was aspirated. There were clots   identified in Morison's pouch. The uterus was elevated with the anterior   abdominal wall. Upon doing so, an actively bleeding cyst was identified   in the right adnexa. A piece of Surgicel was packed against the cyst   and a GYN consultation obtained.  Dr. Miki Rod Benny Dooley came into the   room, visually examined the bleeding cyst and recommended   electrocautery, which was provided. Cautery appeared to stop the   bleeding. A fresh piece of Surgicel was packed against the right ovary   and the uterus returned to its normal position. Attention was then focused on the peritoneum just below the umbilicus. The peritoneum was scored transversely across the abdomen using   electrocautery. Blunt dissection was then used to create a   preperitoneal plane on the left hand side. Due to the very thin nature of   the peritoneum and a number tears resulted. The indirect hernia sac   was identified and retracted into the abdominal cavity. The round   ligament was divided with electrocautery. Once this was reduced, the   left myopectineal orifice was examined. There was a clear indirect   inguinal hernia. There was no direct femoral or obturator hernia. Due to   the tear in the peritoneum, it was decided to use a Ventralight mesh   rated for intra-abdominal placement. The mesh measures 4 x 6 inches. It was rolled and inserted into the abdominal cavity and wallpapered   over the myopectineal orifice. It was tacked along its superior edge   using a secure strap tack. The tack was placed at about 1 cm intervals. Care was taken to ensure avoiding the inferior epigastric. Tacks were   then placed in Yoana Bailer ligament, starting just medial to the femoral vein   and extending to the mid one. What was the peritoneum was then   brought up over the mesh as best as possible and secured to the   abdominal wall using the secure strap tacker. The right ovary was   checked and this was found to be hemostatic. Insufflation was then   vented through the 5-mm trocars and they were removed. The skin at   all trocar sites closed with 4-0 Monocryl and Dermabond. The Ramsey   catheter was removed at the end of the case.  The patient was   extubated in the room and taken to the postanesthesia care unit in stable condition. Instruments and sponge counts correct x2.         MD SATINDER Strange / SHILPA   D:  03/15/2017   14:53   T:  03/15/2017   15:42   Job #:  [id number]

## 2017-03-15 NOTE — ANESTHESIA POSTPROCEDURE EVALUATION
Post-Anesthesia Evaluation and Assessment    Patient: Stewart Up MRN: [id number]  SSN: [id number]    YOB: 1975  Age: 39 y.o. Sex: female       Cardiovascular Function/Vital Signs  Visit Vitals    /77    Pulse 94    Temp 36.8 °C (98.2 °F)    Resp 14    Ht 5' 3\" (1.6 m)    Wt 49.3 kg (108 lb 11 oz)    SpO2 99%    BMI 19.25 kg/m2       Patient is status post MAC anesthesia for Procedure(s):  LAPAROSCOPIC LEFT INGUINAL HERNIA REPAIR WITH MESH. Nausea/Vomiting: None    Postoperative hydration reviewed and adequate. Pain:  Pain Scale 1: Numeric (0 - 10) (03/15/17 1335)  Pain Intensity 1: 0 (03/15/17 1335)   Managed    Neurological Status:   Neuro (WDL): Within Defined Limits (03/15/17 1335)  Neuro  LUE Motor Response: Spontaneous  (03/15/17 1335)  LLE Motor Response: Spontaneous  (03/15/17 1335)  RUE Motor Response: Spontaneous  (03/15/17 1335)  RLE Motor Response: Spontaneous  (03/15/17 1335)   At baseline    Mental Status and Level of Consciousness: Arousable    Pulmonary Status:   O2 Device: Oxygen mask (03/15/17 1320)   Adequate oxygenation and airway patent    Complications related to anesthesia: None    Post-anesthesia assessment completed.  No concerns    Signed By: Brenda Escoto MD     March 15, 2017

## 2017-03-15 NOTE — H&P (VIEW-ONLY)
Surgery Consult    Subjective:     Conner Gonzalez is a 39 y.o.  female who was referred for evaluation of  left inguinal hernia. Symptoms were first noted 3 years ago. Hernia has always been small and asymptomatic. Starting two weeks ago, it has become large and painful. Pain is dull and rated as moderate in intensity. Lump is reducible. Patient does not have symptoms of chronic constipation, chronic cough, difficulty urinating. Patient does not have a history of previous hernia surgery. There are no active problems to display for this patient. Past Medical History:   Diagnosis Date    Anemia     Anemia     Dysmenorrhea     GERD (gastroesophageal reflux disease)     Spontaneous pneumothorax 3/2013 and 8/2013    recurrent right     Past Surgical History:   Procedure Laterality Date    HX OTHER SURGICAL  8/21/2013    Bronchoscopy, RVATS and talc pleurodesis    HX OTHER SURGICAL  2009    ex lap, abdominal for endometriosis    HX OTHER SURGICAL  3/21/2013    Bronch, RVATS, apical bleb rsxn,parietal pleurectomy    HX OTHER SURGICAL  8/21/2013    Bronchoscopy, RVATS and talc pleurodesis      Family History   Problem Relation Age of Onset    Hypertension Mother     Diabetes Father     Heart Disease Father       Social History   Substance Use Topics    Smoking status: Never Smoker    Smokeless tobacco: Not on file    Alcohol use Yes      Comment: rarely      Current Outpatient Prescriptions   Medication Sig    citalopram (CELEXA) 10 mg tablet Take  by mouth daily.  insulin detemir (LEVEMIR FLEXPEN) 100 unit/mL (3 mL) inpn 22 Units by SubCUTAneous route Daily (before breakfast).  ferrous sulfate 325 mg (65 mg iron) CpER Take 325 mg by mouth two (2) times a day.  tranexamic acid (LYSTEDA) 650 mg tab tablet Take 13,500 mg by mouth three (3) times daily as needed for Other (patient takes three times daily for 5 days during menses).     LORazepam (ATIVAN) 1 mg tablet Take 1 mg by mouth three (3) times daily as needed for Anxiety.  metFORMIN (GLUCOPHAGE) 500 mg tablet Take 1 Tab by mouth two (2) times daily (with meals). (Patient taking differently: Take 1,000 mg by mouth two (2) times daily (with meals). )     No current facility-administered medications for this visit. No Known Allergies     Review of Systems:  A comprehensive review of systems was negative except for that written in the History of Present Illness. Objective:     Blood pressure 99/69, pulse 86, temperature 98.5 °F (36.9 °C), temperature source Oral, resp. rate 14, height 5' 3\" (1.6 m), weight 105 lb (47.6 kg), last menstrual period 02/19/2017, SpO2 98 %. Physical Exam:  GENERAL: alert, cooperative, no distress, appears stated age, EYE: conjunctivae/corneas clear. , EOM's intact., LUNG: clear to auscultation bilaterally, HEART: regular rate and rhythm, S1, S2 normal, no murmur, click, rub or gallop, ABDOMEN: soft, non-tender. Bowel sounds normal. No masses,  no organomegaly, : large left inguinal hernia extending into the left external labia. It is reducible. No RIH. NEUROLOGIC: AOx3. Gait normal.  motor strength normal and symmetric.  sensation grossly intact. Assessment:     Left inguinal hernia, easily reducible. Plan:     1. Discussed possibility of incarceration, strangulation, enlargement in size over time, and the risk of emergency surgery in the face of strangulation. Also discussed the risk of surgery including recurrence which can be up to 50% in the case of incisional or complex hernias, use of prosthetic materials (mesh) and the increased risk of infection, postoperative infection and the possible need for reoperation and removal of mesh if used, possibility of postoperative small bowel obstruction or ileus, and the risks of general anesthetic. The patient understands the risks; any and all questions were answered to the patient's satisfaction.      2. Patient has elected to proceed with surgical treatment. Procedure will be scheduled. Signed By: Álvaro Boothe MD     March 3, 2017                   Surgery : laparoscopic left inguinal hernia repair with mesh, possible right, possible open    Length:  1 hours    Diagnosis :     ICD-10-CM ICD-9-CM   1.  Left inguinal hernia K40.90 550.90       Anesthesia : general anesthesia    Surgery Type: Outpatient    Position:  supine    Hospital : [facility]    Blood thinner NO

## 2017-03-15 NOTE — IP AVS SNAPSHOT
[address] [facility] 104 1007 [facility] 
[phone] Patient: Blanca Oneill MRN: [id number] FFP:[id number]/[id number] You are allergic to the following No active allergies Recent Documentation Height Weight BMI OB Status Smoking Status 1.6 m 49.3 kg 19.25 kg/m2 Having regular periods Never Smoker Emergency Contacts Name Discharge Info Relation Home Work Mobile Bruce Garg DISCHARGE CAREGIVER [3] Spouse [3] [phone] About your hospitalization You were admitted on:  March 15, 2017 You last received care in the:  [facility] PACU You were discharged on:  March 15, 2017 Unit phone number:  [phone] Why you were hospitalized Your primary diagnosis was:  Not on File Providers Seen During Your Hospitalizations Provider Role Specialty Primary office phone Dona Tinsley MD Attending Provider General Surgery [phone] Your Primary Care Physician (PCP) Primary Care Physician Office Phone Office Fax [facility] [phone] Follow-up Information Follow up With Details Comments Contact Info Dorena Bernheim, [address] [place] [address] [facility] [address] 
[phone] Your Appointments Wednesday March 29, 2017  2:30 PM EDT  
DIABETES INDIVIDUAL 1.5HR with HAYLIE Lamb  
[facility] DIABETIC TREATMENT ([address]) [facility] 100[address] [facility]  
[phone] [facility] Thursday March 30, 2017  9:00 AM EDT  
POST OP 10 MIN with Dona Tinsley MD  
[facility] Surgery [address] [facility]) [address] 100[address] [facility]  
[phone] Current Discharge Medication List  
  
START taking these medications Dose & Instructions Dispensing Information Comments Morning Noon Evening Bedtime  
 oxyCODONE-acetaminophen 5-325 mg per tablet Commonly known as:  PERCOCET Your last dose was: Your next dose is:    
   
   
 1 to 2 tablets every 4 hours as needed for pain Quantity:  45 Tab Refills:  0 CONTINUE these medications which have NOT CHANGED Dose & Instructions Dispensing Information Comments Morning Noon Evening Bedtime CeleXA 10 mg tablet Generic drug:  citalopram  
   
Your last dose was: Your next dose is:    
   
   
 Dose:  10 mg Take 10 mg by mouth every morning. Refills:  0  
     
   
   
   
  
 ferrous sulfate 325 mg (65 mg iron) Cper Your last dose was: Your next dose is:    
   
   
 Dose:  325 mg Take 325 mg by mouth two (2) times a day. Refills:  0 NovoLOG Flexpen 100 unit/mL Inpn Generic drug:  insulin aspart Your last dose was: Your next dose is:    
   
   
 by SubCUTAneous route Before breakfast, lunch, and dinner. Sliding scale Refills:  0  
     
   
   
   
  
 omeprazole 20 mg capsule Commonly known as:  PRILOSEC Your last dose was: Your next dose is:    
   
   
 Dose:  20 mg Take 20 mg by mouth every morning. Refills:  0  
     
   
   
   
  
 TRESIBA FLEXTOUCH U-100 100 unit/mL (3 mL) Inpn Generic drug:  insulin degludec Your last dose was: Your next dose is:    
   
   
 Dose:  28 Units 28 Units by SubCUTAneous route every morning. Refills:  0 Where to Get Your Medications Information on where to get these meds will be given to you by the nurse or doctor. ! Ask your nurse or doctor about these medications  
  oxyCODONE-acetaminophen 5-325 mg per tablet Discharge Instructions Hernia Repair: What to Expect at Home Your Recovery You are likely to have pain for the next few days. You may also feel like you have the flu, and you may have a low fever and feel tired and nauseated. This is common. You should feel better after a few days and will probably feel much better in 7 days. For several weeks you may feel twinges or pulling in the hernia repair when you move. You may have some bruising on the scrotum and along the penis. This is normal. Men will need to wear a jockstrap or briefs, not boxers, for scrotal support for several days after a groin (inguinal) hernia repair. [employer] bicycle shorts may provide good support. This care sheet gives you a general idea about how long it will take for you to recover. But each person recovers at a different pace. Follow the steps below to get better as quickly as possible. How can you care for yourself at home? Activity · Rest when you feel tired. Getting enough sleep will help you recover. · Try to walk each day. Start by walking a little more than you did the day before. Bit by bit, increase the amount you walk. Walking boosts blood flow and helps prevent pneumonia and constipation. · Avoid strenuous activities, such as biking, jogging, weight lifting, or aerobic exercise, until your doctor says it is okay. · Avoid lifting anything that would make you strain. This may include heavy grocery bags and milk containers, a heavy briefcase or backpack, cat litter or dog food bags, a vacuum , or a child. · You may drive when you are no longer taking pain medicine and can quickly move your foot from the gas pedal to the brake. You must also be able to sit comfortably for a long period of time, even if you do not plan to go far. You might get caught in traffic. · Most people are able to return to work within 1 to 2 weeks after surgery. · You may shower 24 to 48 hours after surgery, if your doctor okays it. Pat the cut (incision) dry.  Do not take a bath for the first 2 weeks, or until your doctor tells you it is okay. · Your doctor will tell you when you can have sex again. Diet · You can eat your normal diet. If your stomach is upset, try bland, low-fat foods like plain rice, broiled chicken, toast, and yogurt. · Drink plenty of fluids (unless your doctor tells you not to). · You may notice that your bowel movements are not regular right after your surgery. This is common. Avoid constipation and straining with bowel movements. You may want to take a fiber supplement every day. If you have not had a bowel movement after a couple of days, ask your doctor about taking a mild laxative. Medicines · Your doctor will tell you if and when you can restart your medicines. He or she will also give you instructions about taking any new medicines. · If you take blood thinners, such as warfarin (Coumadin), clopidogrel (Plavix), or aspirin, be sure to talk to your doctor. He or she will tell you if and when to start taking those medicines again. Make sure that you understand exactly what your doctor wants you to do. · Be safe with medicines. Take pain medicines exactly as directed. ¨ If the doctor gave you a prescription medicine for pain, take it as prescribed. ¨ If you are not taking a prescription pain medicine, take an over-the-counter medicine such as acetaminophen (Tylenol), ibuprofen (Advil, Motrin), or naproxen (Aleve). Read and follow all instructions on the label. ¨ Do not take two or more pain medicines at the same time unless the doctor told you to. Many pain medicines have acetaminophen, which is Tylenol. Too much acetaminophen (Tylenol) can be harmful. · If your doctor prescribed antibiotics, take them as directed. Do not stop taking them just because you feel better. You need to take the full course of antibiotics. · If you think your pain medicine is making you sick to your stomach: 
¨ Take your medicine after meals (unless your doctor has told you not to). ¨ Ask your doctor for a different pain medicine. Incision care · If you have strips of tape on the cut (incision) the doctor made, leave the tape on for a week or until it falls off. · If you have staples closing the cut, you will need to visit your doctor in 1 to 2 weeks to have them removed. · Wash the area daily with warm, soapy water and pat it dry. Follow-up care is a key part of your treatment and safety. Be sure to make and go to all appointments, and call your doctor if you are having problems. It's also a good idea to know your test results and keep a list of the medicines you take. When should you call for help? Call 911 anytime you think you may need emergency care. For example, call if: 
· You passed out (lost consciousness). · You have sudden chest pain and shortness of breath, or you cough up blood. · You have severe pain in your belly. Call your doctor now or seek immediate medical care if: 
· You are sick to your stomach and cannot keep fluids down. · You have signs of a blood clot, such as: 
¨ Pain in your calf, back of the knee, thigh, or groin. ¨ Redness and swelling in your leg or groin. · You have trouble passing urine or stool, especially if you have mild pain or swelling in your lower belly. · Bright red blood has soaked through the bandage over your incision. Watch closely for any changes in your health, and be sure to contact your doctor if: 
· Your swelling is getting worse. · Your swelling is not going down. Where can you learn more? Go to http://justyna-octavio.info/. Enter B886 in the search box to learn more about \"Hernia Repair: What to Expect at Home. \" Current as of: August 9, 2016 Content Version: 11.1 © 6920-6976 [employer]. Care instructions adapted under license by [employer] (which disclaims liability or warranty for this information).  If you have questions about a medical condition or this instruction, always ask your healthcare professional. [place] [address] any warranty or liability for your use of this information. Fei Burnett MD, PhD, Jud Gerber General Surgery at [facility] [address], [address] Rashaad Dickinson 
[phone] Fax [phone] DISCHARGE SUMMARY from your Nurse The following personal items collected during your admission are returned to you:  
Dental Appliance: Dental Appliances: None Vision: Visual Aid: Glasses Hearing Aid:   
Jewelry: Jewelry: [place] Chain Clothing: Clothing: Footwear, Jacket/Coat, Pants, Shirt, Socks, Undergarments Other Valuables: Other Valuables: Eyeglasses (placed inpateint belonging bag) Valuables sent to safe:   
 
PATIENT INSTRUCTIONS: 
 
After general anesthesia or intravenous sedation, for 24 hours or while taking prescription Narcotics: · Limit your activities · Do not drive and operate hazardous machinery · Do not make important personal or business decisions · Do  not drink alcoholic beverages · If you have not urinated within 8 hours after discharge, please contact your surgeon on call. Report the following to your surgeon: 
· Excessive pain, swelling, redness or odor of or around the surgical area · Temperature over 100.5 · Nausea and vomiting lasting longer than 4 hours or if unable to take medications · Any signs of decreased circulation or nerve impairment to extremity: change in color, persistent  numbness, tingling, coldness or increase pain · Any questions [address] Breathing deep and coughing are very important exercises to do after surgery. Deep breathing and coughing open the little air tubes and air sacks in your lungs. You take deep breaths every day. You may not even notice - it is just something you do when you sigh or yawn. It is a natural exercise you do to keep these air passages open. After surgery, take deep breaths and cough, on purpose. Coughing and deep breathing help prevent bronchitis and pneumonia after surgery. If you had chest or belly surgery, use a pillow as a \"hug riddhi\" and hold it tightly to your chest or belly when you cough. DIRECTIONS: 
6. Take 10 to 15 slow deep breaths every hour while awake. 7. Breathe in deeply, and hold it for 2 seconds. 8. Exhale slowly through puckered lips, like blowing up a balloon. 9. After every 4th or 5th deep breath, hug your pillow to your chest or belly and give a hard, deep cough. Yes, it will probably hurt. But doing this exercise is very important part of healing after surgery. Take your pain medicine to help you do this exercise without too much pain. IF YOU HAVE BEEN DIAGNOSED WITH SLEEP APNEA, PLEASE USE YOUR SLEEIFP APNEA DEVICE OR CPAP MACHINE WHEN YOU INTEND TO NAP AFTER TAKING PAIN MEDICATION. Ankle Pumps Ankle pumps increase the circulation of oxygenated blood to your lower extremities and decrease your risk for circulation problems such as blood clots. They also stretch the muscles, tendons and ligaments in your foot and ankle, and prevent joint contracture in the ankle and foot, especially after surgeries on the legs. It is important to do ankle pump exercises regularly after surgery because immobility increases your risk for developing a blood clot. Your doctor may also have you take an Aspirin for the next few days as well. If your doctor did not ask you to take an Aspirin, consult with him before starting Aspirin therapy on your own. Slowly point your foot forward, feeling the muscles on the top of your lower leg stretch, and hold this position for 5 seconds. Next, pull your foot back toward you as far as possible, stretching the calf muscles, and hold that position for 5 seconds. Repeat with the other foot. Perform 10 repetitions every hour while awake for both ankles if possible (down and then up with the foot once is one repetition). You should feel gentle stretching of the muscles in your lower leg when doing this exercise. If you feel pain, or your range of motion is limited, don't  Push too hard. Only go the limit your joint and muscles will let you go. If you have increasing pain, progressively worsening leg warmth or swelling, STOP the exercise and call your doctor. Below is information about the medications your doctor is prescribing after your visit: 
 
 
· PERCOCET Discharge Orders None Introducing [facility] [place] [facility]! Dear Harsha Castano: Thank you for requesting a [employer] account. Our records indicate that you already have an active [employer] account.   You can access your account anytime at [email or web address] Did you know that you can access your hospital and ER discharge instructions at any time in [employer]? You can also review all of your test results from your hospital stay or ER visit. Additional Information If you have questions, please visit the Frequently Asked Questions section of the [employer] website at [email or web address] Remember, FitnessKeeper is NOT to be used for urgent needs. For medical emergencies, dial 911. Now available from your iPhone and Android! General Information Please provide this summary of care documentation to your next provider. Patient Signature:  ____________________________________________________________ Date:  ____________________________________________________________  
  
Srinivasa Fowler Provider Signature:  ____________________________________________________________ Date:  ____________________________________________________________

## 2017-03-15 NOTE — BRIEF OP NOTE
BRIEF OPERATIVE NOTE    Date of Procedure: 3/15/2017   Preoperative Diagnosis: LEFT INGUINAL HERNIA  Postoperative Diagnosis: LEFT INGUINAL HERNIA    Procedure(s):  LAPAROSCOPIC LEFT INGUINAL HERNIA REPAIR WITH MESH, POSSIBLE RIGHT POSSIBLE OPEN  Surgeon(s) and Role:     * Ronald Dudley MD - Primary            Surgical Staff:  Circ-1: Delgado Thakur RN  Scrub Tech-1: Clay Abad  Surg Asst-1: Rosemarie Serum  Surg Asst-Relief: Art Columba Medico  Float Staff: Ydaiel Plummer  Event Time In   Incision Start 1154   Incision Close      Anesthesia: General   Estimated Blood Loss: 200cc  Specimens: * No specimens in log *   Findings: 200 cc dark bloody fluid in the abdomen, hemorrhagic cyst on right ovary,indirect left inguinal hernia  Complications: none  Implants:   Implant Name Type Inv.  Item Serial No.  Lot No. LRB No. Used Action   MESH VELASQUEZ ELLIPTICAL 4X6IN -- VENTRALIGHT S/T -    MESH VELASQUEZ ELLIPTICAL 4X6IN -- VENTRALIGHT S/T 0000 BARD [facility] [id number] Left 1 Implanted

## 2017-03-16 ENCOUNTER — TELEPHONE (OUTPATIENT)
Dept: SURGERY | Age: 42
End: 2017-03-16

## 2017-03-16 NOTE — TELEPHONE ENCOUNTER
Called to follow up with patient she states she is doing good she is a little sore but she is also taking the pain medication has not had a bowel movement yet i did inform her she may need to also take stool softeners for constipation due to the medication.  Urine flow is good incision is good intact with no drainage

## 2017-03-22 ENCOUNTER — OFFICE VISIT (OUTPATIENT)
Dept: SURGERY | Age: 42
End: 2017-03-22

## 2017-03-22 ENCOUNTER — TELEPHONE (OUTPATIENT)
Dept: SURGERY | Age: 42
End: 2017-03-22

## 2017-03-22 VITALS
TEMPERATURE: 98.4 F | OXYGEN SATURATION: 99 % | RESPIRATION RATE: 14 BRPM | SYSTOLIC BLOOD PRESSURE: 105 MMHG | BODY MASS INDEX: 19.67 KG/M2 | HEIGHT: 63 IN | DIASTOLIC BLOOD PRESSURE: 66 MMHG | HEART RATE: 98 BPM | WEIGHT: 111 LBS

## 2017-03-22 DIAGNOSIS — L03.90 CELLULITIS, UNSPECIFIED CELLULITIS SITE: ICD-10-CM

## 2017-03-22 DIAGNOSIS — Z09 POSTOPERATIVE EXAMINATION: Primary | ICD-10-CM

## 2017-03-22 RX ORDER — AMOXICILLIN AND CLAVULANATE POTASSIUM 875; 125 MG/1; MG/1
1 TABLET, FILM COATED ORAL 2 TIMES DAILY
Qty: 20 TAB | Refills: 0 | Status: SHIPPED | OUTPATIENT
Start: 2017-03-22 | End: 2017-04-01

## 2017-03-22 NOTE — LETTER
NOTIFICATION RETURN TO WORK / SCHOOL 
 
3/22/2017 12:25 PM 
 
Ms. Elmer Wade [address] [facility] [address] [phone] To Whom It May Concern: 
 
Elmer Wade is currently under the care of Lynn Norwood. She will return to work on 3/27/2017. No restrictions. If there are questions or concerns please have the patient contact our office. Sincerely, Reuben Becerril NP

## 2017-03-22 NOTE — TELEPHONE ENCOUNTER
Patient called office said she is red and swollen in her groin area. she is putting ice on the area but it continues to stay swollen and red.  Patient is going to come in to the office today at 11:30 to see NICOLAS Barrera

## 2017-03-22 NOTE — PROGRESS NOTES
Subjective:      Germain Jackson is a 39 y.o. female presents for postop care 1 week following left inguinal hernia repair. Appetite is good. Eating a regular diet without difficulty. Bowel movements are regular. The patient is voiding without difficulty. The patient is not having any pain. .  She complains of redness and swelling in her groin. Patient has an advanced directive: NO      Ms. Torrie Fatima has a reminder for a \"due or due soon\" health maintenance. I have asked that she contact her primary care provider for follow-up on this health maintenance. Objective:     Visit Vitals    /66 (BP 1 Location: Left arm, BP Patient Position: Sitting)    Pulse 98    Temp 98.4 °F (36.9 °C) (Oral)    Resp 14    Ht 5' 3\" (1.6 m)    Wt 111 lb (50.3 kg)    LMP 02/17/2017    SpO2 99%    BMI 19.66 kg/m2       General:  alert, cooperative, no distress   Abdomen: soft, bowel sounds active, non-tender, swelling noted in pubic region, slight amount of erythema. Incision:   healing well, no drainage, no erythema, no seroma,, no dehiscence, incision well approximated     Assessment:     Doing well postoperatively  Cellulits. Plan: 1. Start Augmentin BID x 10 days  2. May take Ibuprofen as needed. Use heat or ice  3. Follow up next week  4. Letter given for RTW on 3/27/2017. Pt verbalized understanding and questions were answered to the best of my knowledge and ability.

## 2017-03-22 NOTE — PATIENT INSTRUCTIONS

## 2017-03-22 NOTE — PROGRESS NOTES
1. Have you been to the ER, urgent care clinic since your last visit? Hospitalized since your last visit?no    2. Have you seen or consulted any other health care providers outside of the [facility] since your last visit? Include any pap smears or colon screening.  no

## 2017-03-27 ENCOUNTER — TELEPHONE (OUTPATIENT)
Dept: SURGERY | Age: 42
End: 2017-03-27

## 2017-03-27 NOTE — TELEPHONE ENCOUNTER
Patient called office wanted to know if she could receive documentation providing her  was with her during her surgery and recovery.

## 2017-03-30 ENCOUNTER — OFFICE VISIT (OUTPATIENT)
Dept: SURGERY | Age: 42
End: 2017-03-30

## 2017-03-30 VITALS
DIASTOLIC BLOOD PRESSURE: 69 MMHG | HEART RATE: 85 BPM | RESPIRATION RATE: 14 BRPM | BODY MASS INDEX: 19.05 KG/M2 | HEIGHT: 63 IN | SYSTOLIC BLOOD PRESSURE: 104 MMHG | TEMPERATURE: 98.2 F | WEIGHT: 107.5 LBS | OXYGEN SATURATION: 97 %

## 2017-03-30 DIAGNOSIS — Z09 POSTOPERATIVE EXAMINATION: Primary | ICD-10-CM

## 2017-03-30 NOTE — PROGRESS NOTES
Subjective:      Jaun Elizalde is a 39 y.o. female presents for postop care 2 weeks following lap LIHR. Appetite is good. Eating a regular diet without difficulty. Bowel movements are regular. The patient is voiding without difficulty. The patient is not having any pain. Erythema resolved    Objective:     Visit Vitals    /69 (BP 1 Location: Left arm, BP Patient Position: Sitting)    Pulse 85    Temp 98.2 °F (36.8 °C) (Oral)    Resp 14    Ht 5' 3\" (1.6 m)    Wt 107 lb 8 oz (48.8 kg)    LMP 02/17/2017    SpO2 97%    BMI 19.04 kg/m2       General:  alert, cooperative, no distress, appears stated age   Abdomen: soft, bowel sounds active, non-tender   Incision:   healing well, no drainage, no erythema, no hernia, no seroma, no swelling, no dehiscence, incision well approximated     Assessment:     Doing well postoperatively. Plan:     1. Continue any current medications. 2. Wound care discussed. 3. Pt is to increase activities as tolerated. Vi Britt

## 2017-03-30 NOTE — PROGRESS NOTES
1. Have you been to the ER, urgent care clinic since your last visit? Hospitalized since your last visit?no  2. Have you seen or consulted any other health care providers outside of the [facility] since your last visit? Include any pap smears or colon screening.  no

## 2018-09-26 ENCOUNTER — HOSPITAL ENCOUNTER (INPATIENT)
Age: 43
LOS: 1 days | Discharge: HOME | DRG: 812 | End: 2018-09-27
Attending: EMERGENCY MEDICINE | Admitting: INTERNAL MEDICINE
Payer: COMMERCIAL

## 2018-09-26 ENCOUNTER — APPOINTMENT (OUTPATIENT)
Dept: CT IMAGING | Age: 43
DRG: 812 | End: 2018-09-26
Attending: EMERGENCY MEDICINE
Payer: COMMERCIAL

## 2018-09-26 ENCOUNTER — APPOINTMENT (OUTPATIENT)
Dept: GENERAL RADIOLOGY | Age: 43
DRG: 812 | End: 2018-09-26
Attending: EMERGENCY MEDICINE
Payer: COMMERCIAL

## 2018-09-26 DIAGNOSIS — K29.90 GASTRITIS AND DUODENITIS: Primary | ICD-10-CM

## 2018-09-26 DIAGNOSIS — D64.9 ANEMIA, UNSPECIFIED TYPE: ICD-10-CM

## 2018-09-26 PROBLEM — N92.0 MENORRHAGIA: Status: ACTIVE | Noted: 2018-09-26

## 2018-09-26 PROBLEM — N20.0 KIDNEY STONE: Status: ACTIVE | Noted: 2018-09-26

## 2018-09-26 PROBLEM — E10.9 TYPE 1 DIABETES MELLITUS (HCC): Status: ACTIVE | Noted: 2018-09-26

## 2018-09-26 LAB
ALBUMIN SERPL-MCNC: 3.6 G/DL (ref 3.5–5)
ALBUMIN/GLOB SERPL: 0.9 {RATIO} (ref 1.1–2.2)
ALP SERPL-CCNC: 110 U/L (ref 45–117)
ALT SERPL-CCNC: 23 U/L (ref 12–78)
ANION GAP SERPL CALC-SCNC: 7 MMOL/L (ref 5–15)
APPEARANCE UR: CLEAR
AST SERPL-CCNC: 13 U/L (ref 15–37)
BACTERIA URNS QL MICRO: NEGATIVE /HPF
BASOPHILS # BLD: 0.1 K/UL (ref 0–0.1)
BASOPHILS NFR BLD: 1 % (ref 0–1)
BILIRUB SERPL-MCNC: 0.2 MG/DL (ref 0.2–1)
BILIRUB UR QL: NEGATIVE
BUN SERPL-MCNC: 10 MG/DL (ref 6–20)
BUN/CREAT SERPL: 15 (ref 12–20)
CALCIUM SERPL-MCNC: 9.7 MG/DL (ref 8.5–10.1)
CHLORIDE SERPL-SCNC: 98 MMOL/L (ref 97–108)
CO2 SERPL-SCNC: 27 MMOL/L (ref 21–32)
COLOR UR: ABNORMAL
COMMENT, HOLDF: NORMAL
CREAT SERPL-MCNC: 0.67 MG/DL (ref 0.55–1.02)
DIFFERENTIAL METHOD BLD: ABNORMAL
EOSINOPHIL # BLD: 0.1 K/UL (ref 0–0.4)
EOSINOPHIL NFR BLD: 1 % (ref 0–7)
EPITH CASTS URNS QL MICRO: ABNORMAL /LPF
ERYTHROCYTE [DISTWIDTH] IN BLOOD BY AUTOMATED COUNT: 20.9 % (ref 11.5–14.5)
GLOBULIN SER CALC-MCNC: 4 G/DL (ref 2–4)
GLUCOSE BLD STRIP.AUTO-MCNC: 188 MG/DL (ref 65–100)
GLUCOSE SERPL-MCNC: 375 MG/DL (ref 65–100)
GLUCOSE UR STRIP.AUTO-MCNC: >1000 MG/DL
HCT VFR BLD AUTO: 22.7 % (ref 35–47)
HEMOCCULT STL QL: NEGATIVE
HGB BLD-MCNC: 6.2 G/DL (ref 11.5–16)
HGB UR QL STRIP: NEGATIVE
HYALINE CASTS URNS QL MICRO: ABNORMAL /LPF (ref 0–5)
IMM GRANULOCYTES # BLD: 0 K/UL (ref 0–0.04)
IMM GRANULOCYTES NFR BLD AUTO: 0 % (ref 0–0.5)
KETONES UR QL STRIP.AUTO: NEGATIVE MG/DL
LEUKOCYTE ESTERASE UR QL STRIP.AUTO: NEGATIVE
LIPASE SERPL-CCNC: 373 U/L (ref 73–393)
LYMPHOCYTES # BLD: 1.1 K/UL (ref 0.8–3.5)
LYMPHOCYTES NFR BLD: 16 % (ref 12–49)
MCH RBC QN AUTO: 18.3 PG (ref 26–34)
MCHC RBC AUTO-ENTMCNC: 27.3 G/DL (ref 30–36.5)
MCV RBC AUTO: 67.2 FL (ref 80–99)
MONOCYTES # BLD: 0.6 K/UL (ref 0–1)
MONOCYTES NFR BLD: 8 % (ref 5–13)
NEUTS SEG # BLD: 5 K/UL (ref 1.8–8)
NEUTS SEG NFR BLD: 74 % (ref 32–75)
NITRITE UR QL STRIP.AUTO: NEGATIVE
NRBC # BLD: 0 K/UL (ref 0–0.01)
NRBC BLD-RTO: 0 PER 100 WBC
PH UR STRIP: 5.5 [PH] (ref 5–8)
PLATELET # BLD AUTO: 395 K/UL (ref 150–400)
PLATELET COMMENTS,PCOM: ABNORMAL
PMV BLD AUTO: 10.5 FL (ref 8.9–12.9)
POTASSIUM SERPL-SCNC: 4.1 MMOL/L (ref 3.5–5.1)
PROT SERPL-MCNC: 7.6 G/DL (ref 6.4–8.2)
PROT UR STRIP-MCNC: NEGATIVE MG/DL
RBC # BLD AUTO: 3.38 M/UL (ref 3.8–5.2)
RBC #/AREA URNS HPF: ABNORMAL /HPF (ref 0–5)
RBC MORPH BLD: ABNORMAL
SAMPLES BEING HELD,HOLD: NORMAL
SERVICE CMNT-IMP: ABNORMAL
SODIUM SERPL-SCNC: 132 MMOL/L (ref 136–145)
SP GR UR REFRACTOMETRY: 1.01 (ref 1–1.03)
UR CULT HOLD, URHOLD: NORMAL
UROBILINOGEN UR QL STRIP.AUTO: 0.2 EU/DL (ref 0.2–1)
WBC # BLD AUTO: 6.9 K/UL (ref 3.6–11)
WBC URNS QL MICRO: ABNORMAL /HPF (ref 0–4)

## 2018-09-26 PROCEDURE — 74177 CT ABD & PELVIS W/CONTRAST: CPT

## 2018-09-26 PROCEDURE — 36430 TRANSFUSION BLD/BLD COMPNT: CPT

## 2018-09-26 PROCEDURE — 74011636637 HC RX REV CODE- 636/637: Performed by: EMERGENCY MEDICINE

## 2018-09-26 PROCEDURE — 81001 URINALYSIS AUTO W/SCOPE: CPT | Performed by: EMERGENCY MEDICINE

## 2018-09-26 PROCEDURE — 83690 ASSAY OF LIPASE: CPT | Performed by: EMERGENCY MEDICINE

## 2018-09-26 PROCEDURE — 30233N1 TRANSFUSION OF NONAUTOLOGOUS RED BLOOD CELLS INTO PERIPHERAL VEIN, PERCUTANEOUS APPROACH: ICD-10-PCS | Performed by: INTERNAL MEDICINE

## 2018-09-26 PROCEDURE — 65270000029 HC RM PRIVATE

## 2018-09-26 PROCEDURE — 96374 THER/PROPH/DIAG INJ IV PUSH: CPT

## 2018-09-26 PROCEDURE — 99283 EMERGENCY DEPT VISIT LOW MDM: CPT

## 2018-09-26 PROCEDURE — 80053 COMPREHEN METABOLIC PANEL: CPT | Performed by: EMERGENCY MEDICINE

## 2018-09-26 PROCEDURE — 36415 COLL VENOUS BLD VENIPUNCTURE: CPT | Performed by: EMERGENCY MEDICINE

## 2018-09-26 PROCEDURE — 85025 COMPLETE CBC W/AUTO DIFF WBC: CPT | Performed by: EMERGENCY MEDICINE

## 2018-09-26 PROCEDURE — 86923 COMPATIBILITY TEST ELECTRIC: CPT | Performed by: EMERGENCY MEDICINE

## 2018-09-26 PROCEDURE — 74011250636 HC RX REV CODE- 250/636: Performed by: EMERGENCY MEDICINE

## 2018-09-26 PROCEDURE — 82962 GLUCOSE BLOOD TEST: CPT

## 2018-09-26 PROCEDURE — 82272 OCCULT BLD FECES 1-3 TESTS: CPT | Performed by: EMERGENCY MEDICINE

## 2018-09-26 PROCEDURE — 96361 HYDRATE IV INFUSION ADD-ON: CPT

## 2018-09-26 PROCEDURE — 86900 BLOOD TYPING SEROLOGIC ABO: CPT | Performed by: EMERGENCY MEDICINE

## 2018-09-26 PROCEDURE — 74011636320 HC RX REV CODE- 636/320: Performed by: EMERGENCY MEDICINE

## 2018-09-26 PROCEDURE — 71045 X-RAY EXAM CHEST 1 VIEW: CPT

## 2018-09-26 PROCEDURE — P9016 RBC LEUKOCYTES REDUCED: HCPCS | Performed by: EMERGENCY MEDICINE

## 2018-09-26 RX ORDER — INSULIN LISPRO 100 [IU]/ML
INJECTION, SOLUTION INTRAVENOUS; SUBCUTANEOUS
Status: DISCONTINUED | OUTPATIENT
Start: 2018-09-26 | End: 2018-09-28 | Stop reason: HOSPADM

## 2018-09-26 RX ORDER — SODIUM CHLORIDE 0.9 % (FLUSH) 0.9 %
5-10 SYRINGE (ML) INJECTION EVERY 8 HOURS
Status: DISCONTINUED | OUTPATIENT
Start: 2018-09-26 | End: 2018-09-28 | Stop reason: HOSPADM

## 2018-09-26 RX ORDER — FAMOTIDINE 10 MG/ML
20 INJECTION INTRAVENOUS
Status: COMPLETED | OUTPATIENT
Start: 2018-09-26 | End: 2018-09-26

## 2018-09-26 RX ORDER — SODIUM CHLORIDE 0.9 % (FLUSH) 0.9 %
5-10 SYRINGE (ML) INJECTION AS NEEDED
Status: DISCONTINUED | OUTPATIENT
Start: 2018-09-26 | End: 2018-09-28 | Stop reason: HOSPADM

## 2018-09-26 RX ORDER — DEXTROSE 50 % IN WATER (D50W) INTRAVENOUS SYRINGE
25-50 AS NEEDED
Status: DISCONTINUED | OUTPATIENT
Start: 2018-09-26 | End: 2018-09-28 | Stop reason: HOSPADM

## 2018-09-26 RX ORDER — MAGNESIUM SULFATE 100 %
4 CRYSTALS MISCELLANEOUS AS NEEDED
Status: DISCONTINUED | OUTPATIENT
Start: 2018-09-26 | End: 2018-09-28 | Stop reason: HOSPADM

## 2018-09-26 RX ORDER — ACETAMINOPHEN 325 MG/1
650 TABLET ORAL
Status: DISCONTINUED | OUTPATIENT
Start: 2018-09-26 | End: 2018-09-28 | Stop reason: HOSPADM

## 2018-09-26 RX ORDER — SODIUM CHLORIDE 9 MG/ML
250 INJECTION, SOLUTION INTRAVENOUS AS NEEDED
Status: DISCONTINUED | OUTPATIENT
Start: 2018-09-26 | End: 2018-09-28 | Stop reason: HOSPADM

## 2018-09-26 RX ORDER — ONDANSETRON 2 MG/ML
4 INJECTION INTRAMUSCULAR; INTRAVENOUS
Status: DISCONTINUED | OUTPATIENT
Start: 2018-09-26 | End: 2018-09-28 | Stop reason: HOSPADM

## 2018-09-26 RX ADMIN — INSULIN HUMAN 4 UNITS: 100 INJECTION, SOLUTION PARENTERAL at 19:40

## 2018-09-26 RX ADMIN — Medication 10 ML: at 22:00

## 2018-09-26 RX ADMIN — Medication 10 ML: at 18:43

## 2018-09-26 RX ADMIN — FAMOTIDINE 20 MG: 10 INJECTION, SOLUTION INTRAVENOUS at 18:36

## 2018-09-26 RX ADMIN — IOPAMIDOL 100 ML: 755 INJECTION, SOLUTION INTRAVENOUS at 19:20

## 2018-09-26 RX ADMIN — SODIUM CHLORIDE 1000 ML: 900 INJECTION, SOLUTION INTRAVENOUS at 18:36

## 2018-09-26 NOTE — IP AVS SNAPSHOT
Katelyn Sanders 
 
 
 [address] [facility] 
[phone] Patient: Myriam Dunne MRN: [id number] About your hospitalization You were admitted on:  September 26, 2018 You last received care in the:  [facility] 5M1 MED SURG 1 You were discharged on:  September 27, 2018 Why you were hospitalized Your primary diagnosis was: Anemia Your diagnoses also included:  Menorrhagia, Type 1 Diabetes Mellitus (Hcc), Kidney Gloria Ronnie Follow-up Information Follow up With Details Comments Contact Info Corrinne Medici, MD   [address] [place] [address] 
[phone] Discharge Orders None A check yaw indicates which time of day the medication should be taken. My Medications CONTINUE taking these medications Instructions Each Dose to Equal  
 Morning Noon Evening Bedtime  
 ferrous sulfate 325 mg (65 mg iron) Cper Your last dose was: Your next dose is: Take 325 mg by mouth three (3) times daily. 325 mg NovoLOG Flexpen U-100 Insulin 100 unit/mL Inpn Generic drug:  insulin aspart U-100 Your last dose was: Your next dose is:    
   
   
 by SubCUTAneous route Before breakfast, lunch, and dinner. Sliding scale  
     
   
   
   
  
 omeprazole 20 mg capsule Commonly known as:  PRILOSEC Your last dose was: Your next dose is: Take 20 mg by mouth daily as needed. 20 mg  
    
   
   
   
  
 oxyCODONE-acetaminophen 5-325 mg per tablet Commonly known as:  PERCOCET Your last dose was: Your next dose is:    
   
   
 1 to 2 tablets every 4 hours as needed for pain TRESIBA FLEXTOUCH U-100 100 unit/mL (3 mL) Inpn Generic drug:  insulin degludec Your last dose was: Your next dose is:    
   
   
 30 Units by SubCUTAneous route nightly. 30 Units [facility] Your last dose was: Your next dose is: Take 1 g by mouth daily. 1 g Opioid Education Prescription Opioids: What You Need to Know: 
 
  
 
 
· It is important that you take the medication exactly as they are prescribed. · Keep your medication in the bottles provided by the pharmacist and keep a list of the medication names, dosages, and times to be taken in your wallet. · Do not take other medications without consulting your doctor. DIET:  Diabetic Diet ACTIVITY: Activity as tolerated ADDITIONAL INFORMATION: If you experience any of the following symptoms then please call your primary care physician or return to the emergency room if you cannot get hold of your doctor: Fever, chills, nausea, vomiting, diarrhea, change in mentation, falling, bleeding, shortness of breath. FOLLOW UP CARE: 
Dr. Seda Chowdary MD  you are to call and set up an appointment to see them in 2 weeks. Follow-up with urology on 9/28 at [address] [place] [address] Follow-up with GYN Information obtained by : 
 I understand that if any problems occur once I am at home I am to contact my physician. I understand and acknowledge receipt of the instructions indicated above. Physician's or R.N.'s Signature                                                                  Date/Time Patient or Representative Signature                                                          Date/Time [employer] Announcement We are excited to announce that we are making your provider's discharge notes available to you in [employer]. You will see these notes when they are completed and signed by the physician that discharged you from your recent hospital stay. If you have any questions or concerns about any information you see in [employer], please call the Health Information Department where you were seen or reach out to your Primary Care Provider for more information about your plan of care. Introducing [facility]! Dear Zamzam Urena: Thank you for requesting a [employer] account. Our records indicate that you already have an active [employer] account. You can access your account anytime at [email or web address] Did you know that you can access your hospital and ER discharge instructions at any time in [employer]? You can also review all of your test results from your hospital stay or ER visit. Additional Information If you have questions, please visit the Frequently Asked Questions section of the [employer] website at [email or web address] Remember, [employer] is NOT to be used for urgent needs. For medical emergencies, dial 911. Now available from your iPhone and Android! Introducing Raoul Umana As a [facility] patient, I wanted to make you aware of our electronic visit tool called Raoul Umana. [employer] allows you to connect within minutes with a medical provider 24 hours a day, seven days a week via a mobile device or tablet or logging into a secure website from your computer. You can access Raoul Velazquezfin from anywhere in the [place]. A virtual visit might be right for you when you have a simple condition and feel like you just dont want to get out of bed, or cant get away from work for an appointment, when your regular [facility] provider is not available (evenings, weekends or holidays), or when youre out of town and need minor care. Electronic visits cost only $49 and if the [employer] provider determines a prescription is needed to treat your condition, one can be electronically transmitted to a nearby pharmacy*. Please take a moment to enroll today if you have not already done so. The enrollment process is free and takes just a few minutes. To enroll, please download the [employer] tigist to your tablet or phone, or visit [email or web address] to enroll on your computer. And, as an [address] [place] [address] patient with a [employer] account, the results of your visits will be scanned into your electronic medical record and your primary care provider will be able to view the scanned results. We urge you to continue to see your regular [facility] provider for your ongoing medical care. And while your primary care provider may not be the one available when you seek a Raoul Delgadoandriafin virtual visit, the peace of mind you get from getting a real diagnosis real time can be priceless. For more information on Raoul Dannymichele, view our Frequently Asked Questions (FAQs) at [email or web address]. Sincerely, 
 
Hailey Paredes MD 
Chief Medical Officer [address] *:  certain medications cannot be prescribed via Raoul Umana Providers Seen During Your Hospitalization Provider Specialty Primary office phone Lois Munoz MD Emergency Medicine [phone] Bruna Lucas MD Emergency Medicine [phone] Nedra Maravilla MD Internal Medicine [phone] Hazel Raphael MD Hospitalist [phone] Your Primary Care Physician (PCP) Primary Care Physician Office Phone Office Fax Vasu Gavin [phone] You are allergic to the following No active allergies Recent Documentation Height Weight BMI OB Status Smoking Status 1.6 m 51.3 kg 20.02 kg/m2 Having regular periods Never Smoker Emergency Contacts Name Discharge Info Relation Home Work Mobile [employer] DISCHARGE CAREGIVER [3] Spouse [3] [phone] Patient Belongings The following personal items are in your possession at time of discharge: 
  Dental Appliances: None  Visual Aid: Glasses, With patient      Home Medications: None   Jewelry: Ring, With patient  Clothing: Dress, With patient    Other Valuables: Cell Phone, Eyeglasses, At bedside Please provide this summary of care documentation to your next provider. Signatures-by signing, you are acknowledging that this After Visit Summary has been reviewed with you and you have received a copy. Patient Signature:  ____________________________________________________________ Date:  ____________________________________________________________  
  
Marquis Bone Provider Signature:  ____________________________________________________________ Date:  ____________________________________________________________

## 2018-09-26 NOTE — PROGRESS NOTES
[facility]: MED RECONCILIATION Medications added: · MVI Medications removed: · Citalopram 10mg daily Medications adjusted: · Prilosec- previously 20mg daily · Tresiba- previously 28 units daily · Ferrous sulfate- previously 325mg TID Information obtained from: Patient (good historian, alert, oriented), EYMBXYD Allergies: Review of patient's allergies indicates no known allergies. Prior to Admission Medications:  
 
Medication Documentation Review Audit Reviewed by Denise Pritchard (Pharmacist) on 09/26/18 at [facility] Medication Sig Documenting Provider Last Dose Status Taking?  
 
 ferrous sulfate 325 mg (65 mg iron) CpER Take 325 mg by mouth three (3) times daily. Historical Provider 9/26/2018 AM Active Yes Med Note (Aroldo Braswell. Wed Sep 26, 2018  7:33 PM): Takes Medroxyprogesterone 10mg TID every 28 days during menstrual cycle. Not currently taking at this time 
  
 insulin aspart (NOVOLOG FLEXPEN) 100 unit/mL inpn by SubCUTAneous route Before breakfast, lunch, and dinner. Sliding scale Historical Provider 9/26/2018 Active Yes Med Note (Aroldo Braswell. Wed Sep 26, 2018  7:31 PM):   
  
 insulin degludec (TRESIBA FLEXTOUCH U-100) 100 unit/mL (3 mL) inpn 30 Units by SubCUTAneous route nightly. Historical Provider 9/25/2018 PM Active Yes  
 multivit-minerals/folic acid (WOMENS DAILY GUMMIES PO) Take 1 g by mouth daily. Historical Provider 9/26/2018 AM Active Yes  
 omeprazole (PRILOSEC) 20 mg capsule Take 20 mg by mouth daily as needed. Historical Provider  Active Yes  
 oxyCODONE-acetaminophen (PERCOCET) 5-325 mg per tablet 1 to 2 tablets every 4 hours as needed for pain Arnaud Mensah MD  Active Yes Thank you, 
Arie Chavez, Pharm. D.

## 2018-09-26 NOTE — ED PROVIDER NOTES
HPI Comments: 5:49 PM 
I have evaluated the patient as the Provider in Triage. I have reviewed Her vital signs and the triage nurse assessment. I have talked with the patient and any available family and advised that I am the provider in triage and have ordered the appropriate study to initiate their work up based on the clinical presentation during my assessment. I have advised that the patient will be accommodated in the Main ED as soon as possible. I have also requested to contact the triage nurse or myself immediately if the patient experiences any changes in their condition during this brief waiting period. Lizbeth Figueroa MD 
 
 
Upper abd pain - started yesterday; constant; in March 2017 - had hernia surgery; had D & C last week; no N, V, D; last BM yesterday - normal; pain worse with movement; appetite ok. No urinary sx's. Lizbeth Figueroa MD 
 
37 y.o. female with past medical history significant for DM, endometriosis, GERD, spontaneous pneumothorax, anemia, MVP, and dysmenorrhea who presents to the ED with chief complaint of abdominal pain. Pt reports mid upper and periumbilical abdominal pain onset yesterday that is exacerbated by movement and talking. Pt states her blood sugar was elevated at 225 today. Pt states her last BM was last night. Pt states she recently had a D&C and biopsy 6 days ago by Dr. Segundo Euceda due to an abnormal pap smear in July. Pt denies any recent lifting. Pt states she has hx of an inguinal hernia repair in March 2017. Pt states she has hx of type 1 DM and takes [place] and [address]. Pt denies vaginal bleeding, abdominal cramping, fever, nausea, vomiting, bowel issues, chest pain, or SOB. There are no other acute medical complaints voiced at this time. Social Hx: Never smoker. Rare EtOH use. Works as a teacher. PCP: Alfredo Jenkins MD 
OBGYN: Dr. Segundo Euceda Note written by Levy Aguirre, as dictated by Monika Sanders MD 5:59 PM  
 
 
 The history is provided by the patient. Past Medical History:  
Diagnosis Date  Anemia  Anemia  Diabetes ([address] type 1  Dysmenorrhea  Endometriosis  GERD (gastroesophageal reflux disease)  Mitral valve prolapse  Spontaneous pneumothorax 3/2013 and 8/2013  
 recurrent right--total of 4 episodes, 2 required surgeries, above dates Past Surgical History:  
Procedure Laterality Date  HX OTHER SURGICAL  2009  
 ex lap, abdominal for endometriosis  HX OTHER SURGICAL  3/21/2013 Bronch, RVATS, apical bleb rsxn,parietal pleurectomy  HX OTHER SURGICAL  8/21/2013 Bronchoscopy, RVATS and talc pleurodesis Family History:  
Problem Relation Age of Onset  Hypertension Mother  Diabetes Father  Heart Disease Father Social History Social History  Marital status:  Spouse name: N/A  
 Number of children: N/A  
 Years of education: N/A Occupational History  Not on file. Social History Main Topics  Smoking status: Never Smoker  Smokeless tobacco: Not on file  Alcohol use Yes Comment: rarely  Drug use: No  
 Sexual activity: Not on file Other Topics Concern  Not on file Social History Narrative ALLERGIES: Review of patient's allergies indicates no known allergies. Review of Systems Constitutional: Negative for chills and fever. Respiratory: Negative for cough and shortness of breath. Cardiovascular: Negative for chest pain and leg swelling. Gastrointestinal: Positive for abdominal pain. Negative for blood in stool, constipation, diarrhea, nausea and vomiting. Genitourinary: Negative for dysuria and vaginal bleeding. All other systems reviewed and are negative. Vitals:  
 09/26/18 1747 BP: 123/81 Pulse: (!) 106 Resp: 16 Temp: 98.2 °F (36.8 °C) SpO2: 100% Weight: 51.3 kg (113 lb) Height: 5' 3\" (1.6 m) Physical Exam  
 Constitutional: She is oriented to person, place, and time. She appears well-developed and well-nourished. No distress. HENT:  
Head: Normocephalic and atraumatic. Eyes: Conjunctivae are normal.  
Neck: Normal range of motion. Cardiovascular: Normal rate, regular rhythm, normal heart sounds and intact distal pulses. Exam reveals no friction rub. No murmur heard. Pulmonary/Chest: Effort normal and breath sounds normal. No respiratory distress. She has no wheezes. She has no rales. She exhibits no tenderness. Abdominal: Soft. Bowel sounds are normal. She exhibits no distension. There is tenderness. There is no rebound and no guarding. Epigastric ttp Genitourinary:  
Genitourinary Comments: Brown stool no gross blood; no gross blood in vaginal area Musculoskeletal: Normal range of motion. She exhibits no edema or tenderness. Neurological: She is alert and oriented to person, place, and time. She exhibits normal muscle tone. Coordination normal.  
Skin: Skin is warm and dry. She is not diaphoretic. No pallor. Psychiatric: She has a normal mood and affect. Her behavior is normal.  
Nursing note and vitals reviewed. MDM Number of Diagnoses or Management Options Anemia, unspecified type:  
Gastritis and duodenitis:  
Diagnosis management comments: Pancreatitis vs gastritis vs abd pain from hyperglycemia Doubt related to d and c as no lower abdominal pain or ttp Amount and/or Complexity of Data Reviewed Clinical lab tests: reviewed and ordered Tests in the radiology section of CPT®: ordered and reviewed Obtain history from someone other than the patient: yes (family) Patient Progress Patient progress: stable ED Course Procedures 6:44 PM 
Spoke with pt about anemia and needing admission for transfusion. Reports needing transfusion in spring and was admitted to [facility].  Reports gets heavy cycles at times but this month was not and bled very little post d and c. Will send occult blood in stool given epigastric pain 7:17 PM 
Spoke with dr rodriguez, hospitalist who will admit

## 2018-09-26 NOTE — ED TRIAGE NOTES
Pt c/o mid abd pain since yesterday. H/o hernia repair last year. Last week with D&C. Pt denies n/v/d, and urinary symptoms.

## 2018-09-26 NOTE — ED NOTES
Bedside and Verbal shift change report given to Adela Brunson RN (oncoming nurse) by Marcellus Severe, RN (offgoing nurse). Report included the following information SBAR, ED Summary, MAR and Recent Results.

## 2018-09-27 VITALS
TEMPERATURE: 98.2 F | SYSTOLIC BLOOD PRESSURE: 112 MMHG | HEART RATE: 86 BPM | WEIGHT: 113 LBS | BODY MASS INDEX: 20.02 KG/M2 | RESPIRATION RATE: 16 BRPM | HEIGHT: 63 IN | OXYGEN SATURATION: 98 % | DIASTOLIC BLOOD PRESSURE: 64 MMHG

## 2018-09-27 LAB
ANION GAP SERPL CALC-SCNC: 8 MMOL/L (ref 5–15)
BUN SERPL-MCNC: 9 MG/DL (ref 6–20)
BUN/CREAT SERPL: 18 (ref 12–20)
CALCIUM SERPL-MCNC: 8.4 MG/DL (ref 8.5–10.1)
CHLORIDE SERPL-SCNC: 103 MMOL/L (ref 97–108)
CO2 SERPL-SCNC: 25 MMOL/L (ref 21–32)
CREAT SERPL-MCNC: 0.51 MG/DL (ref 0.55–1.02)
ERYTHROCYTE [DISTWIDTH] IN BLOOD BY AUTOMATED COUNT: 21.6 % (ref 11.5–14.5)
EST. AVERAGE GLUCOSE BLD GHB EST-MCNC: 246 MG/DL
GLUCOSE BLD STRIP.AUTO-MCNC: 256 MG/DL (ref 65–100)
GLUCOSE BLD STRIP.AUTO-MCNC: 278 MG/DL (ref 65–100)
GLUCOSE BLD STRIP.AUTO-MCNC: 292 MG/DL (ref 65–100)
GLUCOSE BLD STRIP.AUTO-MCNC: 300 MG/DL (ref 65–100)
GLUCOSE BLD STRIP.AUTO-MCNC: 316 MG/DL (ref 65–100)
GLUCOSE SERPL-MCNC: 323 MG/DL (ref 65–100)
HBA1C MFR BLD: 10.2 % (ref 4.2–6.3)
HCT VFR BLD AUTO: 23.6 % (ref 35–47)
HCT VFR BLD AUTO: 33.1 % (ref 35–47)
HGB BLD-MCNC: 10.1 G/DL (ref 11.5–16)
HGB BLD-MCNC: 6.8 G/DL (ref 11.5–16)
MCH RBC QN AUTO: 19.8 PG (ref 26–34)
MCHC RBC AUTO-ENTMCNC: 28.8 G/DL (ref 30–36.5)
MCV RBC AUTO: 68.8 FL (ref 80–99)
NRBC # BLD: 0 K/UL (ref 0–0.01)
NRBC BLD-RTO: 0 PER 100 WBC
PLATELET # BLD AUTO: 307 K/UL (ref 150–400)
PMV BLD AUTO: 10.2 FL (ref 8.9–12.9)
POTASSIUM SERPL-SCNC: 3.9 MMOL/L (ref 3.5–5.1)
RBC # BLD AUTO: 3.43 M/UL (ref 3.8–5.2)
SERVICE CMNT-IMP: ABNORMAL
SODIUM SERPL-SCNC: 136 MMOL/L (ref 136–145)
WBC # BLD AUTO: 5.4 K/UL (ref 3.6–11)

## 2018-09-27 PROCEDURE — 85018 HEMOGLOBIN: CPT | Performed by: INTERNAL MEDICINE

## 2018-09-27 PROCEDURE — 74011250637 HC RX REV CODE- 250/637: Performed by: INTERNAL MEDICINE

## 2018-09-27 PROCEDURE — 36415 COLL VENOUS BLD VENIPUNCTURE: CPT | Performed by: INTERNAL MEDICINE

## 2018-09-27 PROCEDURE — 74011636637 HC RX REV CODE- 636/637: Performed by: INTERNAL MEDICINE

## 2018-09-27 PROCEDURE — 80048 BASIC METABOLIC PNL TOTAL CA: CPT | Performed by: INTERNAL MEDICINE

## 2018-09-27 PROCEDURE — 85027 COMPLETE CBC AUTOMATED: CPT | Performed by: INTERNAL MEDICINE

## 2018-09-27 PROCEDURE — P9016 RBC LEUKOCYTES REDUCED: HCPCS | Performed by: EMERGENCY MEDICINE

## 2018-09-27 PROCEDURE — 83036 HEMOGLOBIN GLYCOSYLATED A1C: CPT | Performed by: INTERNAL MEDICINE

## 2018-09-27 PROCEDURE — 82962 GLUCOSE BLOOD TEST: CPT

## 2018-09-27 PROCEDURE — 36430 TRANSFUSION BLD/BLD COMPNT: CPT

## 2018-09-27 RX ORDER — OXYCODONE AND ACETAMINOPHEN 5; 325 MG/1; MG/1
1 TABLET ORAL
Status: DISCONTINUED | OUTPATIENT
Start: 2018-09-27 | End: 2018-09-28 | Stop reason: HOSPADM

## 2018-09-27 RX ORDER — SODIUM CHLORIDE 9 MG/ML
250 INJECTION, SOLUTION INTRAVENOUS AS NEEDED
Status: DISCONTINUED | OUTPATIENT
Start: 2018-09-27 | End: 2018-09-28 | Stop reason: HOSPADM

## 2018-09-27 RX ORDER — LANOLIN ALCOHOL/MO/W.PET/CERES
325 CREAM (GRAM) TOPICAL 3 TIMES DAILY
Status: DISCONTINUED | OUTPATIENT
Start: 2018-09-27 | End: 2018-09-28 | Stop reason: HOSPADM

## 2018-09-27 RX ORDER — INSULIN GLARGINE 100 [IU]/ML
30 INJECTION, SOLUTION SUBCUTANEOUS
Status: DISCONTINUED | OUTPATIENT
Start: 2018-09-27 | End: 2018-09-28 | Stop reason: HOSPADM

## 2018-09-27 RX ADMIN — INSULIN GLARGINE 30 UNITS: 100 INJECTION, SOLUTION SUBCUTANEOUS at 22:07

## 2018-09-27 RX ADMIN — Medication 10 ML: at 14:00

## 2018-09-27 RX ADMIN — INSULIN LISPRO 7 UNITS: 100 INJECTION, SOLUTION INTRAVENOUS; SUBCUTANEOUS at 11:32

## 2018-09-27 RX ADMIN — INSULIN LISPRO 7 UNITS: 100 INJECTION, SOLUTION INTRAVENOUS; SUBCUTANEOUS at 17:16

## 2018-09-27 RX ADMIN — Medication 10 ML: at 05:06

## 2018-09-27 RX ADMIN — FERROUS SULFATE TAB 325 MG (65 MG ELEMENTAL FE) 325 MG: 325 (65 FE) TAB at 22:07

## 2018-09-27 RX ADMIN — FERROUS SULFATE TAB 325 MG (65 MG ELEMENTAL FE) 325 MG: 325 (65 FE) TAB at 08:32

## 2018-09-27 RX ADMIN — INSULIN GLARGINE 30 UNITS: 100 INJECTION, SOLUTION SUBCUTANEOUS at 02:10

## 2018-09-27 RX ADMIN — INSULIN LISPRO 5 UNITS: 100 INJECTION, SOLUTION INTRAVENOUS; SUBCUTANEOUS at 08:30

## 2018-09-27 RX ADMIN — FERROUS SULFATE TAB 325 MG (65 MG ELEMENTAL FE) 325 MG: 325 (65 FE) TAB at 16:07

## 2018-09-27 NOTE — CONSULTS
Gynecology History and Physical    Name: Maria Luisa Sutherland MRN: [id number] SSN: [id number]    YOB: 1975  Age: 37 y.o. Sex: female       Subjective:      Chief complaint:  Abdominal pain and anemia    Landon Gusman is a 37 y.o.  female with a history of abnormal uterine bleeding admitted to the hospital for abdominal pain and anemia. Underwent a D&C for AUB . Biopsy results show no abnormalities. States she has had long history of bleeding to anemia. Has required blood transfusions in the past. Had discussed definitive management including hysterectomy and ablation with Dr. Cas Pathak but declined secondary to desired fertility. Has been taking provera since August. States last heavy cycle in July. Milder bleeding in August with provera. Is taking iron 3x/d but sometimes inconsistently. Denies current vaginal bleeding. No issues since surgery last week. Came in last night with upper abdominal pain. Since arrival, states she is feeling better. Tolerating a diet. OB History      Para Term  AB Living    4 1   1     SAB TAB Ectopic Molar Multiple Live Births    1             Past Medical History:   Diagnosis Date    Anemia     Anemia     Diabetes ([address]     type 1    Dysmenorrhea     Endometriosis     GERD (gastroesophageal reflux disease)     Mitral valve prolapse     Spontaneous pneumothorax 3/2013 and 2013    recurrent right--total of 4 episodes, 2 required surgeries, above dates     Past Surgical History:   Procedure Laterality Date    HX DILATION AND CURETTAGE  2018    HX OTHER SURGICAL      ex lap, abdominal for endometriosis    HX OTHER SURGICAL  3/21/2013    Bronch, RVATS, apical bleb rsxn,parietal pleurectomy    HX OTHER SURGICAL  2013    Bronchoscopy, RVATS and talc pleurodesis     Social History     Occupational History    Not on file.      Social History Main Topics    Smoking status: Never Smoker    Smokeless tobacco: Not on file    Alcohol use Yes      Comment: rarely    Drug use: No    Sexual activity: Not on file     Family History   Problem Relation Age of Onset    Hypertension Mother     Diabetes Father     Heart Disease Father         No Known Allergies  Prior to Admission medications    Medication Sig Start Date End Date Taking? Authorizing Provider   multivit-minerals/folic acid (WOMENS DAILY GUMMIES PO) Take 1 g by mouth daily. Yes Historical Provider   oxyCODONE-acetaminophen (PERCOCET) 5-325 mg per tablet 1 to 2 tablets every 4 hours as needed for pain 3/15/17  Yes Papa Alexandre MD   insulin degludec (TRESIBA FLEXTOUCH U-100) 100 unit/mL (3 mL) inpn 30 Units by SubCUTAneous route nightly. Yes Historical Provider   insulin aspart (NOVOLOG FLEXPEN) 100 unit/mL inpn by SubCUTAneous route Before breakfast, lunch, and dinner. Sliding scale   Yes Historical Provider   omeprazole (PRILOSEC) 20 mg capsule Take 20 mg by mouth daily as needed. Yes Historical Provider   ferrous sulfate 325 mg (65 mg iron) CpER Take 325 mg by mouth three (3) times daily. Yes Historical Provider        Review of Systems  Pertinent items are noted in the History of Present Illness. Objective:     Vitals:    09/27/18 1123 09/27/18 1152 09/27/18 1253 09/27/18 1353   BP: 101/61 108/64 112/70 110/65   Pulse: 99 98 91 92   Resp: 16 16 16 16   Temp: 98.2 °F (36.8 °C) 98 °F (36.7 °C) 98.3 °F (36.8 °C) 98.2 °F (36.8 °C)   SpO2: 100% 99% 98% 99%   Weight:       Height:           Physical Exam:  Patient without distress. Heart: normal peripheral perfusion  Lung: normal respiratory effort  Abdomen: soft, nontender  LE: no edema    Hgb 6.8    Assessment/Plan:     AUB to anemia: sp 3U prbcs.  Suspect acute on chronic after D&C.   - continue iron 3x/day  - continue provera daily  - has f/u w Dr. Jaylin Boyer 10/5 for discussion of bleeding and fertility plans in near future      Signed By:  Tiana Drake MD     September 27, 2018

## 2018-09-27 NOTE — DISCHARGE SUMMARY
Hospitalist Discharge Summary     Patient ID:    Oliver Contreras  [id number]  37 y.o.  1975    Admit date: 9/26/2018    Discharge date and time: 9/27/2018    Admission Diagnoses: Anemia    Chronic Diagnoses:    Problem List as of 9/27/2018  Date Reviewed: 9/26/2018          Codes Class Noted - Resolved    * (Principal)Anemia ICD-10-CM: D64.9  ICD-9-CM: 285.9  9/26/2018 - Present        Menorrhagia ICD-10-CM: N92.0  ICD-9-CM: 626.2  9/26/2018 - Present        Type 1 diabetes mellitus ([facility] [address] ICD-10-CM: E10.9  ICD-9-CM: 250.01  9/26/2018 - Present        Kidney stone ICD-10-CM: N20.0  ICD-9-CM: 592.0  9/26/2018 - Present              Discharge Medications:   Current Discharge Medication List      CONTINUE these medications which have NOT CHANGED    Details   multivit-minerals/folic acid (WOMENS DAILY GUMMIES PO) Take 1 g by mouth daily. oxyCODONE-acetaminophen (PERCOCET) 5-325 mg per tablet 1 to 2 tablets every 4 hours as needed for pain  Qty: 45 Tab, Refills: 0      insulin degludec (TRESIBA FLEXTOUCH U-100) 100 unit/mL (3 mL) inpn 30 Units by SubCUTAneous route nightly. insulin aspart (NOVOLOG FLEXPEN) 100 unit/mL inpn by SubCUTAneous route Before breakfast, lunch, and dinner. Sliding scale      omeprazole (PRILOSEC) 20 mg capsule Take 20 mg by mouth daily as needed. ferrous sulfate 325 mg (65 mg iron) CpER Take 325 mg by mouth three (3) times daily. Follow up Care:    1. Annie Jean-Baptiste MD in 1-2 weeks  2. Urology  3. GYN    Diet:  Diabetic Diet    Disposition:  Home. Advanced Directive:    Discharge Exam:  See today's note.     CONSULTATIONS: Urology and Gynecology    Significant Diagnostic Studies:   Recent Labs      09/27/18   0411  09/26/18   1754   WBC  5.4  6.9   HGB  6.8*  6.2*   HCT  23.6*  22.7*   PLT  307  395     Recent Labs      09/27/18   0411  09/26/18   1754   NA  136  132*   K  3.9  4.1   CL  103  98   CO2  25  27   BUN  9  10   CREA  0.51*  0.67   GLU 323*  375*   CA  8.4*  9.7     Recent Labs      09/26/18   1754   SGOT  13*   ALT  23   AP  110   TBILI  0.2   TP  7.6   ALB  3.6   GLOB  4.0   LPSE  373     No results for input(s): INR, PTP, APTT in the last 72 hours. No lab exists for component: INREXT   No results for input(s): FE, TIBC, PSAT, FERR in the last 72 hours. No results for input(s): PH, PCO2, PO2 in the last 72 hours. No results for input(s): CPK, CKMB in the last 72 hours. No lab exists for component: TROPONINI  Lab Results   Component Value Date/Time    Glucose (POC) 300 (H) 09/27/2018 11:28 AM    Glucose (POC) 278 (H) 09/27/2018 08:11 AM    Glucose (POC) 188 (H) 09/26/2018 09:28 PM    Glucose (POC) 81 03/15/2017 01:14 PM    Glucose (POC) 78 03/15/2017 01:11 PM             HOSPITAL COURSE & TREATMENT RENDERED:   1. Anemia, microcytic: suspect this is 2/2 menorrhagia and recent [facility]. stool for occult blood is negative. Transfuse 1 unit prbcs, but Hb is still low. Will transfuse 2 more units. Evaluated by gynecology and will have outpatient FU.       2.  Menorrhagia: sp recent [facility]. See above      3.  Type 1 diabetes mellitus: resume tresiba with SSI      4. Kidney stone / Abdominal pain: suspect the abdominal pain may be related to kidney stone. Mild dilation of right 'renal collecting system' noted. Will be seen by urology tomorrow in their office.      Discharged in improved condition       Signed:  Reyes Ng MD  9/27/2018  2:00 PM

## 2018-09-27 NOTE — ED NOTES
TRANSFER - OUT REPORT: 
 
Verbal report given to Chelle Valle RN (name) on Derik Vazquez  being transferred to fifth floor room 514 (unit) for routine progression of care Report consisted of patients Situation, Background, Assessment and  
Recommendations(SBAR). Information from the following report(s) SBAR, Kardex, ED Summary, Intake/Output, MAR and Recent Results was reviewed with the receiving nurse. Lines:  
Peripheral IV 09/26/18 Right Antecubital (Active) Site Assessment Clean, dry, & intact 9/26/2018  5:55 PM  
Phlebitis Assessment 0 9/26/2018  5:55 PM  
Infiltration Assessment 0 9/26/2018  5:55 PM  
Dressing Type Tape;Transparent 9/26/2018  5:55 PM  
Hub Color/Line Status Pink;Flushed;Patent 9/26/2018  5:55 PM  
Action Taken Blood drawn 9/26/2018  5:55 PM  
  
 
Opportunity for questions and clarification was provided. Patient transported with: 
 [employer]

## 2018-09-27 NOTE — PROGRESS NOTES
0725- Bedside and Verbal shift change report given to FLOYD Good (oncoming nurse) by Vel Luong RN  (offgoing nurse). Report included the following information SBAR, Kardex, Intake/Output, MAR and Recent Results. Pt observed in bed, resting quietly, on room air, denies any pain or discomfort at this time. Will assess. Analia- Urology called and updated. Stated that pt okay to be discharged today and to have pt come in for an office visit tomorrow. 1055- Blood administration started at 75mL/hr.  
 
1113- Blood administration rate increased to 125mL/hr. Pt verbalized understanding of administration and signs and symptoms of adverse effects of blood. 1930- Bedside and Verbal shift change report given to [id number] Tigre Gupta (oncoming nurse) by FLOYD Good (offgoing nurse). Report included the following information SBAR, Kardex, Intake/Output, MAR and Recent Results.

## 2018-09-27 NOTE — INTERDISCIPLINARY ROUNDS
36 YO AA female currently with no pain. Admitted profoundly anemic but no hematuria. CT with conbtrast shows almost incidental renal stones upto 4mm and minimal right sided dilation. I have made appointment in our urgent care office tomorrow 9/28 at 1pm---[address] [place] [address], [facility]. No restrictions from us.

## 2018-09-27 NOTE — DIABETES MGMT
DTC Progress Note Recommendations/ Comments: Pt is Type 1 diabetic and will require basal and prandial insulin. If appropriate, please consider: 
-Adding prandial insulin 4 units ac tid  
-Adding consistent carb to diet order Current hospital DM medication:  
-Lantus 30 units every bedtime 
-Humalog normal sensitivity correction Chart reviewed on Catherine Hunt. Patient is a 37 y.o. female with known history of Type 1 Diabetes on Tresiba 30 units nightly and Novolog SSI at home. A1c:  
Lab Results Component Value Date/Time Hemoglobin A1c 10.2 (H) 09/27/2018 04:11 AM  
 
 
Recent Glucose Results: Lab Results Component Value Date/Time  (H) 09/27/2018 04:11 AM  
  (H) 09/26/2018 05:54 PM  
 GLUCPOC 300 (H) 09/27/2018 11:28 AM  
 GLUCPOC 278 (H) 09/27/2018 08:11 AM  
 GLUCPOC 188 (H) 09/26/2018 09:28 PM  
  
 
Lab Results Component Value Date/Time Creatinine 0.51 (L) 09/27/2018 04:11 AM  
 
Estimated Creatinine Clearance: 115.2 mL/min (based on Cr of 0.51). Active Orders Diet DIET REGULAR  
  
 
PO intake: Patient Vitals for the past 72 hrs: 
 % Diet Eaten  
09/27/18 1046 90 % Will continue to follow as needed. Thank you Dipti Rivera RD, CDE [facility] Office: [phone] Pager: [phone]

## 2018-09-27 NOTE — PROGRESS NOTES
2100 
TRANSFER - IN REPORT: 
 
Verbal report received from Creek, RN n(name) on Reji Giron  being received from ED (unit) for routine progression of care Report consisted of patients Situation, Background, Assessment and  
Recommendations(SBAR). Information from the following report(s) SBAR, Kardex, Intake/Output, MAR, Recent Results and Med Rec Status was reviewed with the receiving nurse. Opportunity for questions and clarification was provided. Assessment completed upon patients arrival to unit and care assumed. Primary Nurse Phyllis Pierson RN and Eric Mcgovern RN performed a dual skin assessment on this patient No impairment noted Valeriy score is 23

## 2018-09-27 NOTE — PROGRESS NOTES
[facility] [address] 
[address] [facility], [address] [place] [address] 
[phone] Medical Progress Note NAME: Jenny Peacock :  1975 MRM:  [id number] Date/Time: 2018  8:40 AM 
 
  
Assessment and Plan: 1. Anemia, microcytic: suspect this is 2/2 menorrhagia and recent [facility]. stool for occult blood is negative. Transfuse 1 unit prbcs, but Hb is still low. Will transfuse 2 more units. Consult gynecology 
  
2. Menorrhagia: sp recent [facility]. See above 
  
3. Type 1 diabetes mellitus: resume tresiba with SSI 
  
4.  Kidney stone / Abdominal pain: suspect the abdominal pain may be related to kidney stone. Mild dilation of right 'renal collecting system' noted. Consult urology.  
  
  
 
 
  
Subjective: Chief Complaint:  Follow up of pt who was admitted with abdominal pain and anemia. Feels better ROS: 
(bold if positive, if negative) Tolerating PT  Tolerating Diet Objective:  
 
Last 24hrs VS reviewed since prior progress note. Most recent are: 
 
Visit Vitals  /67 (BP 1 Location: Left arm, BP Patient Position: Sitting)  Pulse 89  Temp 98.6 °F (37 °C)  Resp 16  
 Ht 5' 3\" (1.6 m)  Wt 51.3 kg (113 lb)  SpO2 98%  BMI 20.02 kg/m2 SpO2 Readings from Last 6 Encounters:  
18 98% 17 97% 17 99% 03/15/17 99% 17 100% 17 98% Intake/Output Summary (Last 24 hours) at 18 0840 Last data filed at 18 0210 Gross per 24 hour Intake              310 ml Output                0 ml Net              310 ml Physical Exam: 
 
Gen:  Well-developed, well-nourished, in no acute distress HEENT:  Pink conjunctivae, PERRL, hearing intact to voice, moist mucous membranes Neck:  Supple, without masses, thyroid non-tender Resp:  No accessory muscle use, clear breath sounds without wheezes rales or rhonchi Card:  No murmurs, normal S1, S2 without thrills, bruits or peripheral edema Abd:  Soft, non-tender, non-distended, normoactive bowel sounds are present, no palpable organomegaly and no detectable hernias Lymph:  No cervical or inguinal adenopathy Musc:  No cyanosis or clubbing Skin:  No rashes or ulcers, skin turgor is good Neuro:  Cranial nerves are grossly intact, no focal motor weakness, follows commands appropriately Psych:  Good insight, oriented to person, place and time, alert 
__________________________________________________________________ Medications Reviewed: (see below) Medications:  
 
Current Facility-Administered Medications Medication Dose Route Frequency  ferrous sulfate tablet 325 mg  325 mg Oral TID  insulin glargine (LANTUS) injection 30 Units  30 Units SubCUTAneous QHS  oxyCODONE-acetaminophen (PERCOCET) 5-325 mg per tablet 1 Tab  1 Tab Oral Q6H PRN  
 sodium chloride (NS) flush 5-10 mL  5-10 mL IntraVENous Q8H  
 sodium chloride (NS) flush 5-10 mL  5-10 mL IntraVENous PRN  
 0.9% sodium chloride infusion 250 mL  250 mL IntraVENous PRN  
 sodium chloride (NS) flush 5-10 mL  5-10 mL IntraVENous Q8H  
 sodium chloride (NS) flush 5-10 mL  5-10 mL IntraVENous PRN  
 acetaminophen (TYLENOL) tablet 650 mg  650 mg Oral Q4H PRN  
 ondansetron (ZOFRAN) injection 4 mg  4 mg IntraVENous Q4H PRN  
 glucose chewable tablet 16 g  4 Tab Oral PRN  
 dextrose (D50W) injection syrg 12.5-25 g  25-50 mL IntraVENous PRN  
 glucagon (GLUCAGEN) injection 1 mg  1 mg IntraMUSCular PRN  
 insulin lispro (HUMALOG) injection   SubCUTAneous AC&HS Lab Data Reviewed: (see below) Lab Review:  
 
Recent Labs  
   09/27/18 0411 09/26/18 
 1754 WBC  5.4  6.9 HGB  6.8*  6.2* HCT  23.6*  22.7*  
PLT  307  395 Recent Labs  
   09/27/18 
 0411  09/26/18 
 1754 NA  136  132* K  3.9  4.1 CL  103  98 CO2  25  27 GLU  323*  375* BUN  9  10 CREA  0.51*  0.67  
 CA  8.4*  9.7 ALB   --   3.6 TBILI   --   0.2 SGOT   --   13* ALT   --   23 Lab Results Component Value Date/Time Glucose (POC) 278 (H) 09/27/2018 08:11 AM  
 Glucose (POC) 188 (H) 09/26/2018 09:28 PM  
 Glucose (POC) 81 03/15/2017 01:14 PM  
 Glucose (POC) 78 03/15/2017 01:11 PM  
 Glucose (POC) 85 03/15/2017 09:49 AM  
 
No results for input(s): PH, PCO2, PO2, HCO3, FIO2 in the last 72 hours. No results for input(s): INR in the last 72 hours. No lab exists for component: INREXT All Micro Results Procedure Component Value Units Date/Time URINE CULTURE HOLD SAMPLE [[id number]] Collected:  09/26/18 1835 Order Status:  Completed Specimen:  Urine from Serum Updated:  09/26/18 1852 Urine culture hold URINE ON HOLD IN MICROBIOLOGY DEPT FOR 3 DAYS. IF UNPRESERVED URINE IS SUBMITTED, IT CANNOT BE USED FOR ADDITIONAL TESTING AFTER 24 HRS, RECOLLECTION WILL BE REQUIRED. I have reviewed notes of prior 24hr. Other pertinent lab: Total time spent with patient: 28 Care Plan discussed with: Patient, Nursing Staff and >50% of time spent in counseling and coordination of care Discussed:  Care Plan Prophylaxis:  SCD's Disposition:  Home w/Family 
        
___________________________________________________ Attending Physician: Ebonie Oconnell MD

## 2018-09-27 NOTE — DISCHARGE INSTRUCTIONS
ACUTE DIAGNOSES:  Anemia    CHRONIC MEDICAL DIAGNOSES:  Problem List as of 9/27/2018  Date Reviewed: 9/26/2018          Codes Class Noted - Resolved    * (Principal)Anemia ICD-10-CM: D64.9  ICD-9-CM: 285.9  9/26/2018 - Present        Menorrhagia ICD-10-CM: N92.0  ICD-9-CM: 626.2  9/26/2018 - Present        [facility] ICD-10-CM: E10.9  ICD-9-CM: 250.01  9/26/2018 - Present        Kidney stone ICD-10-CM: N20.0  ICD-9-CM: 592.0  9/26/2018 - Present              DISCHARGE MEDICATIONS:          · It is important that you take the medication exactly as they are prescribed. · Keep your medication in the bottles provided by the pharmacist and keep a list of the medication names, dosages, and times to be taken in your wallet. · Do not take other medications without consulting your doctor. DIET:  Diabetic Diet    ACTIVITY: Activity as tolerated    ADDITIONAL INFORMATION: If you experience any of the following symptoms then please call your primary care physician or return to the emergency room if you cannot get hold of your doctor: Fever, chills, nausea, vomiting, diarrhea, change in mentation, falling, bleeding, shortness of breath. FOLLOW UP CARE:  Dr. Seda Chowdary MD  you are to call and set up an appointment to see them in 2 weeks. Follow-up with urology on 9/28 at [address] [place] [address]    Follow-up with GYN    Information obtained by :  I understand that if any problems occur once I am at home I am to contact my physician. I understand and acknowledge receipt of the instructions indicated above.                                                                                                                                            Physician's or R.N.'s Signature                                                                  Date/Time Patient or Representative Signature                                                          Date/Time

## 2018-09-27 NOTE — PROGRESS NOTES
Bedside and Verbal shift change report given to FLOYD Hernandez (oncoming nurse) by Charlie Reyna RN (offgoing nurse). Report included the following information SBAR, Kardex, Intake/Output, MAR, Recent Results and Med Rec Status.

## 2018-09-27 NOTE — H&P
[address] [facility] [address] [facility], [place] [address] 
[phone] Admission History and Physical 
 
 
NAME:  Kelley Weems :   1975 MRN:  [id number] PCP:  Marlynn Nissen, MD  
 
Date/Time:  2018 Subjective: CHIEF COMPLAINT: abdominal pain HISTORY OF PRESENT ILLNESS:    
Ms. Adilene Geller is a 37 y.o. with h/o dm, menorrhagia who presents with abdominal pain. Pain started yesterday worsened today. Located in her upper central abdomen. No melena or brbpr. Had [address] last Thursday due to heavy periods. An endometrial biopsy was taken at the time. She does use NSAIDs but really only once a month during her period Past Medical History:  
Diagnosis Date  Anemia  Anemia  Diabetes ([address] type 1  Dysmenorrhea  Endometriosis  GERD (gastroesophageal reflux disease)  Mitral valve prolapse  Spontaneous pneumothorax 3/2013 and 2013  
 recurrent right--total of 4 episodes, 2 required surgeries, above dates Past Surgical History:  
Procedure Laterality Date  HX DILATION AND CURETTAGE  2018  HX OTHER SURGICAL    
 ex lap, abdominal for endometriosis  HX OTHER SURGICAL  3/21/2013 Bronch, RVATS, apical bleb rsxn,parietal pleurectomy  HX OTHER SURGICAL  2013 Bronchoscopy, RVATS and talc pleurodesis Social History Substance Use Topics  Smoking status: Never Smoker  Smokeless tobacco: Not on file  Alcohol use Yes Comment: rarely Family History Problem Relation Age of Onset  Hypertension Mother  Diabetes Father  Heart Disease Father No Known Allergies Prior to Admission medications Medication Sig Start Date End Date Taking? Authorizing Provider  
multivit-minerals/folic acid (WOMENS DAILY GUMMIES PO) Take 1 g by mouth daily. Yes Historical Provider oxyCODONE-acetaminophen (PERCOCET) 5-325 mg per tablet 1 to 2 tablets every 4 hours as needed for pain 3/15/17  Yes Benoit Odonnell MD  
insulin degludec (TRESIBA FLEXTOUCH U-100) 100 unit/mL (3 mL) inpn 30 Units by SubCUTAneous route nightly. Yes Historical Provider  
insulin aspart (NOVOLOG FLEXPEN) 100 unit/mL inpn by SubCUTAneous route Before breakfast, lunch, and dinner. Sliding scale   Yes Historical Provider  
omeprazole (PRILOSEC) 20 mg capsule Take 20 mg by mouth daily as needed. Yes Historical Provider  
ferrous sulfate 325 mg (65 mg iron) CpER Take 325 mg by mouth three (3) times daily. Yes Historical Provider Review of Systems: 
 
Gen:  Eyes:  ENT:  CVS:  Pulm:  GI:  Abdominal pain :   
MS:  Skin:  Psych:  Endo:   
Hem:  Renal:   
Neuro:    
 
 
  
Objective: VITALS:   
Vital signs reviewed; most recent are: 
 
Visit Vitals  /74  Pulse (!) 106  Temp 98.2 °F (36.8 °C)  Resp 16  
 Ht 5' 3\" (1.6 m)  Wt 51.3 kg (113 lb)  SpO2 100%  BMI 20.02 kg/m2 SpO2 Readings from Last 6 Encounters:  
09/26/18 100% 03/30/17 97% 03/22/17 99% 03/15/17 99% 03/09/17 100% 03/02/17 98% No intake or output data in the 24 hours ending 09/26/18 2043 Exam:  
 
Physical Exam: 
 
Gen:  Well-developed, well-nourished, in no acute distress HEENT:  Pink conjunctivae, PERRL, hearing intact to voice, moist mucous membranes Neck:  Supple, without masses, thyroid non-tender Resp:  No accessory muscle use, clear breath sounds without wheezes rales or rhonchi 
Card:  No murmurs, normal S1, S2 without thrills, bruits or peripheral edema Abd:  Soft, non-tender, non-distended, normoactive bowel sounds are present, no palpable organomegaly Lymph:  No cervical adenopathy Musc:  No cyanosis or clubbing Skin:  No rashes or ulcers, skin turgor is good Neuro:  Cranial nerves 3-12 are grossly intact,  strength is 5/5 bilaterally, dorsi / plantarflexion strength is 5/5 bilaterally, follows commands appropriately Psych:  Alert with good insight. Oriented to person, place, and time Labs: 
 
Recent Labs  
   09/26/18 
 1754 WBC  6.9 HGB  6.2* HCT  22.7*  
PLT  395 Recent Labs  
   09/26/18 
 1754 NA  132* K  4.1 CL  98  
CO2  27 GLU  375* BUN  10  
CREA  0.67 CA  9.7 ALB  3.6 SGOT  13* ALT  23 No components found for: [place] No results for input(s): PH, PCO2, PO2, HCO3, FIO2 in the last 72 hours. No results for input(s): INR in the last 72 hours. No lab exists for component: INREXT Assessment/Plan:   
  
Principal Problem: Anemia: suspect this is 2/2 menorrhagia and recent [facility]. Transfuse 1 unit prbcs. Consult gynecology Menorrhagia: sp recent [facility]. See above Type 1 diabetes mellitus: resume tresiba with SSI Kidney stone / Abdominal pain: suspect the abdominal pain may be related to kidney stone. Mild dilation of right 'renal collecting system' noted. Ask urology to review. Renal function normal currently. Surrogate decision maker:  Total time spent with patient: 60 Minutes Care Plan discussed with: Patient and Family Discussed:  Care Plan Prophylaxis:  SCD's 
 
Probable Disposition:  Home w/Family 
        
___________________________________________________ Attending Physician: Des Mckeon MD

## 2018-09-28 LAB
ABO + RH BLD: NORMAL
BLD PROD TYP BPU: NORMAL
BLOOD GROUP ANTIBODIES SERPL: NORMAL
BPU ID: NORMAL
CROSSMATCH RESULT,%XM: NORMAL
SPECIMEN EXP DATE BLD: NORMAL
STATUS OF UNIT,%ST: NORMAL
UNIT DIVISION, %UDIV: 0

## 2018-09-28 NOTE — PROGRESS NOTES
Spiritual Care Partner Volunteer visited patient in 65 on 9.28.18. Documented by: 
 
Darcie Finney M.Div, M.S, [facility] available at [address] [place] [phone]

## 2019-01-10 ENCOUNTER — HOSPITAL ENCOUNTER (EMERGENCY)
Age: 44
Discharge: HOME | End: 2019-01-10
Attending: EMERGENCY MEDICINE
Payer: COMMERCIAL

## 2019-01-10 ENCOUNTER — APPOINTMENT (OUTPATIENT)
Dept: GENERAL RADIOLOGY | Age: 44
End: 2019-01-10
Attending: EMERGENCY MEDICINE
Payer: COMMERCIAL

## 2019-01-10 VITALS
SYSTOLIC BLOOD PRESSURE: 108 MMHG | BODY MASS INDEX: 20.02 KG/M2 | OXYGEN SATURATION: 100 % | DIASTOLIC BLOOD PRESSURE: 73 MMHG | TEMPERATURE: 98.8 F | RESPIRATION RATE: 15 BRPM | WEIGHT: 113 LBS | HEART RATE: 85 BPM

## 2019-01-10 DIAGNOSIS — R73.9 HYPERGLYCEMIA: ICD-10-CM

## 2019-01-10 DIAGNOSIS — Z87.42 HISTORY OF HEAVY VAGINAL BLEEDING: ICD-10-CM

## 2019-01-10 DIAGNOSIS — D62 ACUTE BLOOD LOSS ANEMIA: Primary | ICD-10-CM

## 2019-01-10 LAB
ALBUMIN SERPL-MCNC: 3.5 G/DL (ref 3.5–5)
ALBUMIN/GLOB SERPL: 0.8 {RATIO} (ref 1.1–2.2)
ALP SERPL-CCNC: 138 U/L (ref 45–117)
ALT SERPL-CCNC: 18 U/L (ref 12–78)
ANION GAP SERPL CALC-SCNC: 13 MMOL/L (ref 5–15)
AST SERPL-CCNC: 11 U/L (ref 15–37)
ATRIAL RATE: 88 BPM
BASOPHILS # BLD: 0.1 K/UL (ref 0–0.1)
BASOPHILS NFR BLD: 1 % (ref 0–1)
BILIRUB SERPL-MCNC: 0.2 MG/DL (ref 0.2–1)
BNP SERPL-MCNC: 33 PG/ML (ref 0–125)
BUN SERPL-MCNC: 11 MG/DL (ref 6–20)
BUN/CREAT SERPL: 13 (ref 12–20)
CALCIUM SERPL-MCNC: 9.3 MG/DL (ref 8.5–10.1)
CALCULATED P AXIS, ECG09: 58 DEGREES
CALCULATED R AXIS, ECG10: 2 DEGREES
CALCULATED T AXIS, ECG11: 9 DEGREES
CHLORIDE SERPL-SCNC: 99 MMOL/L (ref 97–108)
CO2 SERPL-SCNC: 26 MMOL/L (ref 21–32)
COMMENT, HOLDF: NORMAL
CREAT SERPL-MCNC: 0.86 MG/DL (ref 0.55–1.02)
DIAGNOSIS, 93000: NORMAL
DIFFERENTIAL METHOD BLD: ABNORMAL
EOSINOPHIL # BLD: 0.1 K/UL (ref 0–0.4)
EOSINOPHIL NFR BLD: 1 % (ref 0–7)
ERYTHROCYTE [DISTWIDTH] IN BLOOD BY AUTOMATED COUNT: 19.5 % (ref 11.5–14.5)
GLOBULIN SER CALC-MCNC: 4.3 G/DL (ref 2–4)
GLUCOSE BLD STRIP.AUTO-MCNC: 264 MG/DL (ref 65–100)
GLUCOSE BLD STRIP.AUTO-MCNC: 291 MG/DL (ref 65–100)
GLUCOSE SERPL-MCNC: 397 MG/DL (ref 65–100)
HCG UR QL: NEGATIVE
HCT VFR BLD AUTO: 25.4 % (ref 35–47)
HGB BLD-MCNC: 7 G/DL (ref 11.5–16)
IMM GRANULOCYTES # BLD AUTO: 0 K/UL (ref 0–0.04)
IMM GRANULOCYTES NFR BLD AUTO: 0 % (ref 0–0.5)
LYMPHOCYTES # BLD: 1.3 K/UL (ref 0.8–3.5)
LYMPHOCYTES NFR BLD: 17 % (ref 12–49)
MCH RBC QN AUTO: 18.6 PG (ref 26–34)
MCHC RBC AUTO-ENTMCNC: 27.6 G/DL (ref 30–36.5)
MCV RBC AUTO: 67.6 FL (ref 80–99)
MONOCYTES # BLD: 0.5 K/UL (ref 0–1)
MONOCYTES NFR BLD: 7 % (ref 5–13)
NEUTS SEG # BLD: 5.5 K/UL (ref 1.8–8)
NEUTS SEG NFR BLD: 74 % (ref 32–75)
NRBC # BLD: 0 K/UL (ref 0–0.01)
NRBC BLD-RTO: 0 PER 100 WBC
P-R INTERVAL, ECG05: 114 MS
PLATELET # BLD AUTO: 359 K/UL (ref 150–400)
PMV BLD AUTO: 10.7 FL (ref 8.9–12.9)
POTASSIUM SERPL-SCNC: 3.7 MMOL/L (ref 3.5–5.1)
PROT SERPL-MCNC: 7.8 G/DL (ref 6.4–8.2)
Q-T INTERVAL, ECG07: 342 MS
QRS DURATION, ECG06: 62 MS
QTC CALCULATION (BEZET), ECG08: 413 MS
RBC # BLD AUTO: 3.76 M/UL (ref 3.8–5.2)
RBC MORPH BLD: ABNORMAL
SAMPLES BEING HELD,HOLD: NORMAL
SERVICE CMNT-IMP: ABNORMAL
SERVICE CMNT-IMP: ABNORMAL
SODIUM SERPL-SCNC: 138 MMOL/L (ref 136–145)
TROPONIN I SERPL-MCNC: <0.05 NG/ML
VENTRICULAR RATE, ECG03: 88 BPM
WBC # BLD AUTO: 7.5 K/UL (ref 3.6–11)

## 2019-01-10 PROCEDURE — 74011636637 HC RX REV CODE- 636/637: Performed by: EMERGENCY MEDICINE

## 2019-01-10 PROCEDURE — 81025 URINE PREGNANCY TEST: CPT

## 2019-01-10 PROCEDURE — 86923 COMPATIBILITY TEST ELECTRIC: CPT

## 2019-01-10 PROCEDURE — 71046 X-RAY EXAM CHEST 2 VIEWS: CPT

## 2019-01-10 PROCEDURE — 86900 BLOOD TYPING SEROLOGIC ABO: CPT

## 2019-01-10 PROCEDURE — 94762 N-INVAS EAR/PLS OXIMTRY CONT: CPT

## 2019-01-10 PROCEDURE — 96360 HYDRATION IV INFUSION INIT: CPT

## 2019-01-10 PROCEDURE — 85025 COMPLETE CBC W/AUTO DIFF WBC: CPT

## 2019-01-10 PROCEDURE — 80053 COMPREHEN METABOLIC PANEL: CPT

## 2019-01-10 PROCEDURE — 93005 ELECTROCARDIOGRAM TRACING: CPT

## 2019-01-10 PROCEDURE — 36430 TRANSFUSION BLD/BLD COMPNT: CPT

## 2019-01-10 PROCEDURE — 84484 ASSAY OF TROPONIN QUANT: CPT

## 2019-01-10 PROCEDURE — 82962 GLUCOSE BLOOD TEST: CPT

## 2019-01-10 PROCEDURE — P9016 RBC LEUKOCYTES REDUCED: HCPCS

## 2019-01-10 PROCEDURE — 36415 COLL VENOUS BLD VENIPUNCTURE: CPT

## 2019-01-10 PROCEDURE — 74011250636 HC RX REV CODE- 250/636: Performed by: EMERGENCY MEDICINE

## 2019-01-10 PROCEDURE — 99285 EMERGENCY DEPT VISIT HI MDM: CPT

## 2019-01-10 PROCEDURE — 83880 ASSAY OF NATRIURETIC PEPTIDE: CPT

## 2019-01-10 RX ORDER — SODIUM CHLORIDE 9 MG/ML
250 INJECTION, SOLUTION INTRAVENOUS AS NEEDED
Status: DISCONTINUED | OUTPATIENT
Start: 2019-01-10 | End: 2019-01-11 | Stop reason: HOSPADM

## 2019-01-10 RX ADMIN — INSULIN HUMAN 4 UNITS: 100 INJECTION, SOLUTION PARENTERAL at 20:50

## 2019-01-10 RX ADMIN — SODIUM CHLORIDE 500 ML: 900 INJECTION, SOLUTION INTRAVENOUS at 20:54

## 2019-01-10 NOTE — ED PROVIDER NOTES
38 yo AAF with hx anemia, endometriosis, pthx presents with c/o sob x 5 days. worsening today. Worse with exertion. Some chest tightness. She thinks she may be anemic again as she has hx and has heavy menses. Reports MP ended 5 days ago. No hx recent travel. No hx blood clots. + hx pthx. 3:01 PM 
I have evaluated the patient as the Provider in Triage. I have reviewed Her vital signs and the triage nurse assessment. I have talked with the patient and any available family and advised that I am the provider in triage and have ordered the appropriate study to initiate their work up based on the clinical presentation during my assessment. I have advised that the patient will be accommodated in the Main ED as soon as possible. I have also requested to contact the triage nurse or myself immediately if the patient experiences any changes in their condition during this brief waiting period. Eran Rodriguez MD 
 
 
Karen Gross is a 37 y.o. female who presents ambulatory with motherto the ED with a c/o sob x 5 days, worse today. Pt notes she has anemia, worse after her heavy menses. Her last menses was last week. Pt has been transfused in the past. She reports the dizziness is worse with movement and standing. She denies cough. She specifically denies any fevers, chills, nausea, vomiting,  abd pain, urinary sx, headache, rash, diarrhea, sweating or weight loss. Pt has not been to see her pcp for her sx. She does note some chest tightness. Pt denies recent travel, prolonged sitting, recent procedure or injury, or use of hormones. She notes no hx of blood clots, but reports a hx of pneumothorax. Elvie Mancuso PCP: Angela Catalan MD 
PMHx significant for: Past Medical History: 
No date: Anemia No date: Anemia No date: Diabetes ([facility] [address] Comment:  type 1 No date: Dysmenorrhea No date: Endometriosis No date: GERD (gastroesophageal reflux disease) No date: Mitral valve prolapse 3/2013 and 8/2013: Spontaneous pneumothorax Comment:  recurrent right--total of 4 episodes, 2 required  
             surgeries, above dates PSHx significant for: Past Surgical History: 
09/20/2018: HX DILATION AND CURETTAGE 
2009: HX OTHER SURGICAL Comment:  ex lap, abdominal for endometriosis 3/21/2013: HX OTHER SURGICAL Comment:  Bronch, RVATS, apical bleb rsxn,parietal pleurectomy 8/21/2013: HX OTHER SURGICAL Comment:  Bronchoscopy, RVATS and talc pleurodesis Social Hx: Tobacco: denies  EtOH: denies  Illicit drug use: denies There are no further complaints or symptoms at this time. The history is provided by the patient and a parent. Past Medical History:  
Diagnosis Date  Anemia  Anemia  Diabetes ([facility] [address] type 1  Dysmenorrhea  Endometriosis  GERD (gastroesophageal reflux disease)  Mitral valve prolapse  Spontaneous pneumothorax 3/2013 and 8/2013  
 recurrent right--total of 4 episodes, 2 required surgeries, above dates Past Surgical History:  
Procedure Laterality Date  HX DILATION AND CURETTAGE  09/20/2018  HX OTHER SURGICAL  2009  
 ex lap, abdominal for endometriosis  HX OTHER SURGICAL  3/21/2013 Bronch, RVATS, apical bleb rsxn,parietal pleurectomy  HX OTHER SURGICAL  8/21/2013 Bronchoscopy, RVATS and talc pleurodesis Family History:  
Problem Relation Age of Onset  Hypertension Mother  Diabetes Father  Heart Disease Father Social History Socioeconomic History  Marital status:  Spouse name: Not on file  Number of children: Not on file  Years of education: Not on file  Highest education level: Not on file Social Needs  Financial resource strain: Not on file  Food insecurity - worry: Not on file  Food insecurity - inability: Not on file  Transportation needs - medical: Not on file  Transportation needs - non-medical: Not on file Occupational History  Not on file Tobacco Use  Smoking status: Never Smoker  Smokeless tobacco: Never Used Substance and Sexual Activity  Alcohol use: Yes Comment: rarely  Drug use: No  
 Sexual activity: Not on file Other Topics Concern  Not on file Social History Narrative  Not on file ALLERGIES: Patient has no known allergies. Review of Systems Constitutional: Negative for chills and fever. HENT: Negative for congestion, rhinorrhea, sneezing and sore throat. Eyes: Negative for redness and visual disturbance. Respiratory: Positive for chest tightness and shortness of breath. Cardiovascular: Negative for chest pain and leg swelling. Gastrointestinal: Negative for abdominal pain, nausea and vomiting. Genitourinary: Negative for difficulty urinating and frequency. Musculoskeletal: Negative for back pain, myalgias and neck stiffness. Skin: Negative for rash. Neurological: Negative for dizziness, syncope, weakness and headaches. Hematological: Negative for adenopathy. Patient Vitals for the past 12 hrs: 
 Temp Pulse Resp BP SpO2  
01/10/19 2256  85 15  100 % 01/10/19 2255  88 15 108/73 100 % 01/10/19 2250  81 15 106/69 100 % 01/10/19 2245  80 16 114/71 100 % 01/10/19 2230  79 16 113/64 100 % 01/10/19 2215  83 14 113/68 100 % 01/10/19 2200  92 18 112/73 100 % 01/10/19 2145  83 13 113/77 100 % 01/10/19 2132 (P) 98.8 °F (37.1 °C) (P) 81  (P) 116/78   
01/10/19 2130  80 16 116/78 100 % 01/10/19 2115  81 23 116/78 100 % 01/10/19 2100  96 17 126/83 100 % 01/10/19 2056  88 17 123/79 100 % 01/10/19 2050  89 15 112/78 100 % 01/10/19 2045  86 16 110/75 100 % 01/10/19 2030  88 15 116/75 100 % 01/10/19 2015  82 15 117/77 100 % Physical Exam  
Constitutional: She is oriented to person, place, and time. She appears well-developed and well-nourished. No distress.   
HENT:  
 Head: Normocephalic and atraumatic. Right Ear: External ear normal.  
Left Ear: External ear normal.  
Eyes: EOM are normal. Pupils are equal, round, and reactive to light. Neck: Neck supple. No JVD present. No tracheal deviation present. Cardiovascular: Normal rate, regular rhythm, normal heart sounds and intact distal pulses. Exam reveals no gallop and no friction rub. No murmur heard. Tachy rate Pulmonary/Chest: Effort normal and breath sounds normal. No stridor. No respiratory distress. She has no wheezes. She has no rales. She exhibits no tenderness. Abdominal: Soft. Bowel sounds are normal. She exhibits no distension and no mass. There is no tenderness. There is no rebound and no guarding. No hernia. Musculoskeletal: Normal range of motion. She exhibits no edema, tenderness or deformity. Lymphadenopathy:  
  She has no cervical adenopathy. Neurological: She is alert and oriented to person, place, and time. No cranial nerve deficit. Coordination normal.  
Skin: Skin is warm and dry. Capillary refill takes less than 2 seconds. No rash noted. No erythema. No pallor. Psychiatric: She has a normal mood and affect. Her behavior is normal.  
Nursing note and vitals reviewed. MDM Number of Diagnoses or Management Options Amount and/or Complexity of Data Reviewed Clinical lab tests: ordered and reviewed Tests in the radiology section of CPT®: ordered and reviewed Tests in the medicine section of CPT®: reviewed and ordered Obtain history from someone other than the patient: yes (mother) Review and summarize past medical records: yes Independent visualization of images, tracings, or specimens: yes Critical Care Total time providing critical care: 30-74 minutes Procedures 3:37 PM 
Discussed pt, sx, hx and current findings with Mercy Watson MD. She is in agreement with plan and will see pt. Will get labs, urine, cxr, ekg and continue to monitor. Kathy Patel. ELMER Oviedo 
 
 
ED EKG interpretation: 3:22 PM 
Rhythm: normal sinus rhythm; and regular . Rate (approx.): 88; Axis: normal; P wave: normal; QRS interval: normal ; ST/T wave: non-specific changes; Other findings: abnormal ekg. This EKG was interpreted by Narciso Ramirez MD,ED Provider. ,5:54 PM  
Dr Heather Chapman in to update pt on current findings. Pt would like to be transfused 1 unit, but prefers to be discharged as opposed to admitted. Last hgb was 10 9/2018. Pt symptomatic with tachycardia, dizziness, sob and heavy periods. lmp last week. Kathy Patel. ELMER Oviedo Critical Care: The reason for providing this level of medical care for this critically ill patient was due to a critical illness that impaired one or more vital organ systems such that there was a high probability of imminent or life threatening deterioration in the patients condition. This care involved high complexity decision making to assess, manipulate, and support vital system functions. Total critical care time spent exclusive of procedures:  35 min 7:15 PM 
Discussed pt's hx, disposition, and available diagnostic and imaging results with Dr Heather Chapman. Reviewed care plans. Agrees with plans as outlined. Care of pt transferred to Dr Heather Chapman. Kathy Patel. ELMER O[place]iedo 
 
LABORATORY TESTS: 
Recent Results (from the past 12 hour(s)) GLUCOSE, POC Collection Time: 01/10/19  8:23 PM  
Result Value Ref Range Glucose (POC) 291 (H) 65 - 100 mg/dL Performed by [employer] GLUCOSE, POC Collection Time: 01/10/19 10:38 PM  
Result Value Ref Range Glucose (POC) 264 (H) 65 - 100 mg/dL Performed by [employer] IMAGING RESULTS: 
 
Xr Chest Pa Lat Result Date: 1/10/2019 CLINICAL HISTORY: Chest pain INDICATION: Chest pain endometriosis, recurrent spontaneous right-sided pneumothoraces COMPARISON: 9/26/2018 FINDINGS: PA and lateral views of the chest are obtained.  The cardiopericardial silhouette is within normal limits. Right sided pleural effusion/atelectasis with diminished lung volumes on the right chronic in nature. Interfissural fluid also chronic in nature. The patient is on a cardiac monitor. IMPRESSION: Chronic small right-sided pleural effusion with diminished lung volumes on the right. MEDICATIONS GIVEN: 
Medications  
sodium chloride 0.9 % bolus infusion 500 mL (0 mL IntraVENous IV Completed 1/10/19 2154) insulin regular (NOVOLIN R, HUMULIN R) injection 4 Units (4 Units SubCUTAneous Given 1/10/19 2050) IMPRESSION: 
1. Acute blood loss anemia 2. History of heavy vaginal bleeding 3. Hyperglycemia PLAN: 
1. Discharge Medication List as of 1/10/2019 10:38 PM  
  
CONTINUE these medications which have NOT CHANGED Details  
multivit-minerals/folic acid (WOMENS DAILY GUMMIES PO) Take 1 g by mouth daily. , Historical Med  
  
insulin degludec (TRESIBA FLEXTOUCH U-100) 100 unit/mL (3 mL) inpn 30 Units by SubCUTAneous route nightly., Historical Med  
  
insulin aspart (NOVOLOG FLEXPEN) 100 unit/mL inpn by SubCUTAneous route Before breakfast, lunch, and dinner. Sliding scale, Historical Med  
  
omeprazole (PRILOSEC) 20 mg capsule Take 20 mg by mouth daily as needed., Historical Med  
  
ferrous sulfate 325 mg (65 mg iron) CpER Take 325 mg by mouth three (3) times daily. , Historical Med 2. Follow-up Information Follow up With Specialties Details Why Contact Info Tori Solomon MD Obstetrics & Gynecology, Gynecology, Obstetrics Schedule an appointment as soon as possible for a visit in 3 days and continue your iron [address] [place] [address] 
[phone] [facility] EMERGENCY DEPT Emergency Medicine  If symptoms worsen [address] [facility] 
[phone] Return to ED if worse Pt has been reexamined.   Pt has no new complaints, changes or physical findings. Care plan outlined and precautions discussed. All available results were reviewed with pt. All medications were reviewed with pt. All of pt's questions and concerns were addressed. Pt agrees to F/U as instructed and agrees to return to ED upon further deterioration. Pt is ready to go home.  
PALLAVI Hall

## 2019-01-10 NOTE — ED TRIAGE NOTES
Patient with complaints of shortness of breath and chest pressure. Reports hx of anemia and heavy menstrual cycles, recently came off of cycle. Hx of multiple pneumothorax.

## 2019-01-11 NOTE — ED NOTES
Pt finished blood feels better. No longer sob. Discussed keeping eye on bs. She is calling dr pope in am as she is already taking iron. Not currently menstruating

## 2019-01-11 NOTE — DISCHARGE INSTRUCTIONS
Patient Education            We hope that we have addressed all of your medical concerns. The examination and treatment you received in the Emergency Department were for an emergent problem and were not intended as complete care. It is important that you follow up with your healthcare provider(s) for ongoing care. If your symptoms worsen or do not improve as expected, and you are unable to reach your usual health care provider(s), you should return to the Emergency Department. Today's healthcare is undergoing tremendous change, and patient satisfaction surveys are one of the many tools to assess the quality of medical care. You may receive a survey from the [employer] regarding your experience in the Emergency Department. I hope that your experience has been completely positive, particularly the medical care that I provided. As such, please participate in the survey; anything less than excellent does not meet my expectations or intentions. [facility] and 8 [facility] participate in nationally recognized quality of care measures. If your blood pressure is greater than 120/80, as reported below, we urge that you seek medical care to address the potential of high blood pressure, commonly known as hypertension. Hypertension can be hereditary or can be caused by certain medical conditions, pain, stress, or \"white coat syndrome. \"       Please make an appointment with your health care provider(s) for follow up of your Emergency Department visit.        VITALS:   Patient Vitals for the past 8 hrs:   Temp Pulse Resp BP SpO2   01/10/19 2132 (P) 98.8 °F (37.1 °C) (P) 81 -- (P) 116/78 --   01/10/19 2056 -- 88 17 123/79 100 %   01/10/19 2050 -- 89 15 112/78 100 %   01/10/19 2045 -- 86 16 110/75 100 %   01/10/19 1955 98.8 °F (37.1 °C) 88 16 122/82 100 %   01/10/19 1945 -- 85 14 113/76 100 %   01/10/19 1930 -- 83 16 110/78 100 %   01/10/19 1925 98.9 °F (37.2 °C) 90 15 125/76 100 %   01/10/19 1915 -- 85 17 115/70 100 %   01/10/19 1910 99.1 °F (37.3 °C) 88 15 114/72 100 %   01/10/19 1845 99.4 °F (37.4 °C) 90 16 124/83 100 %   01/10/19 1715 -- 100 27 116/74 99 %   01/10/19 1700 -- 96 18 130/72 100 %   01/10/19 1645 -- 94 16 113/76 100 %   01/10/19 1630 -- 90 15 117/73 100 %   01/10/19 1544 -- -- -- -- 100 %   01/10/19 1530 -- 89 21 120/84 100 %   01/10/19 1500 98.2 °F (36.8 °C) (!) 101 20 (!) 148/97 100 %          Thank you for allowing us to provide you with medical care today. We realize that you have many choices for your emergency care needs. Please choose us in the future for any continued health care needs.       Ruy Menon MD    [facility], [facility].   Office: [phone]            Recent Results (from the past 24 hour(s))   EKG, 12 LEAD, INITIAL    Collection Time: 01/10/19  3:22 PM   Result Value Ref Range    Ventricular Rate 88 BPM    Atrial Rate 88 BPM    P-R Interval 114 ms    QRS Duration 62 ms    Q-T Interval 342 ms    QTC Calculation (Bezet) 413 ms    Calculated P Axis 58 degrees    Calculated R Axis 2 degrees    Calculated T Axis 9 degrees    Diagnosis       Normal sinus rhythm  Nonspecific ST abnormality  Abnormal ECG  When compared with ECG of 09-MAR-2017 12:33,  Questionable change in QRS axis  Confirmed by Elisa Parsons MD., [facility] ([id number]) on 1/10/2019 7:09:13 PM     CBC WITH AUTOMATED DIFF    Collection Time: 01/10/19  3:32 PM   Result Value Ref Range    WBC 7.5 3.6 - 11.0 K/uL    RBC 3.76 (L) 3.80 - 5.20 M/uL    HGB 7.0 (L) 11.5 - 16.0 g/dL    HCT 25.4 (L) 35.0 - 47.0 %    MCV 67.6 (L) 80.0 - 99.0 FL    MCH 18.6 (L) 26.0 - 34.0 PG    MCHC 27.6 (L) 30.0 - 36.5 g/dL    RDW 19.5 (H) 11.5 - 14.5 %    PLATELET 550 638 - 810 K/uL    MPV 10.7 8.9 - 12.9 FL    NRBC 0.0 0  WBC    ABSOLUTE NRBC 0.00 0.00 - 0.01 K/uL    NEUTROPHILS 74 32 - 75 %    LYMPHOCYTES 17 12 - 49 %    MONOCYTES 7 5 - 13 %    EOSINOPHILS 1 0 - 7 % BASOPHILS 1 0 - 1 %    IMMATURE GRANULOCYTES 0 0.0 - 0.5 %    ABS. NEUTROPHILS 5.5 1.8 - 8.0 K/UL    ABS. LYMPHOCYTES 1.3 0.8 - 3.5 K/UL    ABS. MONOCYTES 0.5 0.0 - 1.0 K/UL    ABS. EOSINOPHILS 0.1 0.0 - 0.4 K/UL    ABS. BASOPHILS 0.1 0.0 - 0.1 K/UL    ABS. IMM. GRANS. 0.0 0.00 - 0.04 K/UL    DF SMEAR SCANNED      RBC COMMENTS ANISOCYTOSIS  1+        RBC COMMENTS MICROCYTOSIS  PRESENT        RBC COMMENTS HYPOCHROMIA  3+       METABOLIC PANEL, COMPREHENSIVE    Collection Time: 01/10/19  3:32 PM   Result Value Ref Range    Sodium 138 136 - 145 mmol/L    Potassium 3.7 3.5 - 5.1 mmol/L    Chloride 99 97 - 108 mmol/L    CO2 26 21 - 32 mmol/L    Anion gap 13 5 - 15 mmol/L    Glucose 397 (H) 65 - 100 mg/dL    BUN 11 6 - 20 MG/DL    Creatinine 0.86 0.55 - 1.02 MG/DL    BUN/Creatinine ratio 13 12 - 20      GFR est AA >60 >60 ml/min/1.73m2    GFR est non-AA >60 >60 ml/min/1.73m2    Calcium 9.3 8.5 - 10.1 MG/DL    Bilirubin, total 0.2 0.2 - 1.0 MG/DL    ALT (SGPT) 18 12 - 78 U/L    AST (SGOT) 11 (L) 15 - 37 U/L    Alk.  phosphatase 138 (H) 45 - 117 U/L    Protein, total 7.8 6.4 - 8.2 g/dL    Albumin 3.5 3.5 - 5.0 g/dL    Globulin 4.3 (H) 2.0 - 4.0 g/dL    A-G Ratio 0.8 (L) 1.1 - 2.2     TROPONIN I    Collection Time: 01/10/19  3:32 PM   Result Value Ref Range    Troponin-I, Qt. <0.05 <0.05 ng/mL   NT-PRO BNP    Collection Time: 01/10/19  3:32 PM   Result Value Ref Range    NT pro-BNP 33 0 - 125 PG/ML   TYPE & SCREEN    Collection Time: 01/10/19  3:32 PM   Result Value Ref Range    Crossmatch Expiration 01/13/2019     ABO/Rh(D) B POSITIVE     Antibody screen NEG     Unit number [id number]     Blood component type RC LR     Unit division 00     Status of unit ISSUED     Crossmatch result Compatible    SAMPLES BEING HELD    Collection Time: 01/10/19  3:32 PM   Result Value Ref Range    SAMPLES BEING HELD RED,BLUE,GOLD     COMMENT        Add-on orders for these samples will be processed based on acceptable specimen integrity and analyte stability, which may vary by analyte. HCG URINE, QL. - POC    Collection Time: 01/10/19  3:38 PM   Result Value Ref Range    Pregnancy test,urine (POC) NEGATIVE  NEG     GLUCOSE, POC    Collection Time: 01/10/19  8:23 PM   Result Value Ref Range    Glucose (POC) 291 (H) 65 - 100 mg/dL    Performed by Sofiya Pang        Xr Chest Pa Lat    Result Date: 1/10/2019  CLINICAL HISTORY: Chest pain INDICATION: Chest pain endometriosis, recurrent spontaneous right-sided pneumothoraces COMPARISON: 9/26/2018 FINDINGS: PA and lateral views of the chest are obtained. The cardiopericardial silhouette is within normal limits. Right sided pleural effusion/atelectasis with diminished lung volumes on the right chronic in nature. Interfissural fluid also chronic in nature. The patient is on a cardiac monitor. IMPRESSION: Chronic small right-sided pleural effusion with diminished lung volumes on the right. Iron Deficiency Anemia: Care Instructions  Your Care Instructions    Anemia means that you do not have enough red blood cells. Red blood cells carry oxygen around your body. When you have anemia, it can make you pale, weak, and tired. Many things can cause anemia. The most common cause is loss of blood. This can happen if you have heavy menstrual periods. It can also happen if you have bleeding in your stomach or bowel. You can also get anemia if you don't have enough iron in your diet or if it's hard for your body to absorb iron. In some cases, pregnancy causes anemia. That's because a pregnant woman needs more iron. Your doctor may do more tests to find the cause of your anemia. If a disease or other health problem is causing it, your doctor will treat that problem. It's important to follow up with your doctor to make sure that your iron level returns to normal.  Follow-up care is a key part of your treatment and safety.  Be sure to make and go to all appointments, and call your doctor if you are having problems. It's also a good idea to know your test results and keep a list of the medicines you take. How can you care for yourself at home? · If your doctor recommended iron pills, take them as directed. ? Try to take the pills on an empty stomach. You can do this about 1 hour before or 2 hours after meals. But you may need to take iron with food to avoid an upset stomach. ? Do not take antacids or drink milk or anything with caffeine within 2 hours of when you take your iron. They can keep your body from absorbing the iron well. ? Vitamin C helps your body absorb iron. You may want to take iron pills with a glass of orange juice or some other food high in vitamin C.  ? Iron pills may cause stomach problems. These include heartburn, nausea, diarrhea, constipation, and cramps. It can help to drink plenty of fluids and include fruits, vegetables, and fiber in your diet. ? It's normal for iron pills to make your stool a greenish or grayish black. But internal bleeding can also cause dark stool. So it's important to tell your doctor about any color changes. ? Call your doctor if you think you are having a problem with your iron pills. Even after you start to feel better, it will take several months for your body to build up its supply of iron. ? If you miss a pill, don't take a double dose. ? Keep iron pills out of the reach of small children. Too much iron can be very dangerous. · Eat foods with a lot of iron. These include red meat, shellfish, poultry, and eggs. They also include beans, raisins, whole-grain bread, and leafy green vegetables. · Steam your vegetables. This is the best way to prepare them if you want to get as much iron as possible. · Be safe with medicines. Do not take nonsteroidal anti-inflammatory pain relievers unless your doctor tells you to. These include aspirin, naproxen (Aleve), and ibuprofen (Advil, Motrin). · Liquid iron can stain your teeth.  But you can mix it with water or juice and drink it with a straw. Then it won't get on your teeth. When should you call for help? Call 911 anytime you think you may need emergency care. For example, call if:    · You passed out (lost consciousness).    Call your doctor now or seek immediate medical care if:    · You are short of breath.     · You are dizzy or light-headed, or you feel like you may faint.     · You have new or worse bleeding.    Watch closely for changes in your health, and be sure to contact your doctor if:    · You feel weaker or more tired than usual.     · You do not get better as expected. Where can you learn more? Go to http://justynaGizmo5octavio.info/. Enter V848 in the search box to learn more about \"Iron Deficiency Anemia: Care Instructions. \"  Current as of: May 7, 2018  Content Version: 11.8  © 3717-0854 [employer]. Care instructions adapted under license by [employer] (which disclaims liability or warranty for this information). If you have questions about a medical condition or this instruction, always ask your healthcare professional. [address] any warranty or liability for your use of this information. Patient Education        Anemia From Heavy Bleeding: Care Instructions  Your Care Instructions    Anemia means that your body does not have enough red blood cells. Red blood cells carry oxygen around the body. When you have anemia, you may feel dizzy, tired, and weak. You may also feel your heart pounding. For some people, it's hard to focus and think clearly. One common cause of anemia is bleeding. Bleeding from ulcers, hemorrhoids, cancer, or other problems can cause anemia. It may also be caused by heavy menstrual periods. Your treatment may include iron pills. Iron helps your body make hemoglobin. Hemoglobin is the part of the red blood cell that carries oxygen.  If you have severe anemia, you may need a blood transfusion to give you red blood cells as quickly as possible. Sometimes it takes several months to get iron levels back to normal.  Follow-up care is a key part of your treatment and safety. Be sure to make and go to all appointments, and call your doctor if you are having problems. It's also a good idea to know your test results and keep a list of the medicines you take. How can you care for yourself at home? · Be safe with medicines. Take your medicines exactly as prescribed. Call your doctor if you think you are having a problem with your medicine. · Follow your doctor's advice about eating foods that have a lot of iron in them. These include red meat, shellfish, poultry, and eggs. They also include beans, raisins, whole-grain bread, and leafy green vegetables. · Steam your vegetables. This is the best way to prepare them if you want to get as much iron as possible. · Iron pills can cause constipation. If you take them, there are things you can do to avoid constipation. Drink plenty of fluids, eat foods with a lot of fiber, and exercise every day. When should you call for help? Call 911 anytime you think you may need emergency care. For example, call if:    · You passed out (lost consciousness).     · Your stools are maroon or very bloody.    Call your doctor now or seek immediate medical care if:    · You are short of breath.     · You have new or worse bleeding.     · You are dizzy or light-headed, or you feel like you may faint.    Watch closely for changes in your health, and be sure to contact your doctor if:    · You feel weaker or more tired than usual.     · You do not get better as expected. Where can you learn more? Go to http://justyna-octavio.info/. Enter V051 in the search box to learn more about \"Anemia From Heavy Bleeding: Care Instructions. \"  Current as of: May 7, 2018  Content Version: 11.8  © 3638-9593 [employer].  Care instructions adapted under license by [facility] (which disclaims liability or warranty for this information). If you have questions about a medical condition or this instruction, always ask your healthcare professional. [address] any warranty or liability for your use of this information. Patient Education        Learning About Blood Transfusions  What is a blood transfusion? Blood transfusion is a medical treatment to replace the blood or parts of blood that your body has lost. The blood goes through a tube from a bag to an intravenous (IV) catheter and into your vein. You may need a blood transfusion after losing blood from an injury, a major surgery, an illness that causes bleeding, or an illness that destroys blood cells. Transfusions are also used to give you the parts of blood--such as platelets, plasma, or substances that cause clotting--that your body needs to fight an illness or stop bleeding. How is a blood transfusion done? Before you receive a blood transfusion, your blood is tested to find out what your blood type is. Blood or blood parts that are a match with your blood type are ordered by your doctor. Blood is typed as A, B, AB, or O. It is also typed as Rh-positive or Rh-negative. Your blood is also screened to look for antibodies that might react with the blood that is given to you. The blood you are getting is checked and rechecked to make sure that it's the right type for you. A sample of your blood is mixed with a sample of the blood you will receive to check for problems. Before actually giving you the transfusion, a doctor and nurses will look at the label on the package of blood and compare it to your hospital ID bracelet and medical records. The transfusion begins only when all agree that this is the correct blood and that you are the correct person to receive it. To receive the transfusion, you will have an intravenous (IV) catheter inserted into a vein.  A tube connects the catheter to the bag containing the blood, which is placed higher than your body. The blood then flows slowly into your vein. A doctor or nurse will check you several times during the transfusion to watch for a reaction or other problems. What are the possible risks? Blood transfusions have many benefits and are often life-saving. But they also have a few risks. Possible risks include:  · Your body's reaction to receiving new blood. This may include:  ? Fever. ? Allergic reactions. ? Breathing problems. · An infection from the blood. This risk is small because of the strict rules placed on handling and storing blood. Getting a viral infection, such as HIV or hepatitis B or C, through blood transfusions has become very rare. The U[place]. Food and Drug Administration (FDA) enforces strict guidelines on the collection, testing, storage, and use of blood. · Getting the wrong blood type by accident. Severe reactions, which can be life-threatening, are very rare. What can you expect after a blood transfusion? Here are some things you can do at home to help prevent infection at the transfusion site:  · Wash the area daily with warm, soapy water, and pat it dry. Don't use hydrogen peroxide or alcohol, which can slow healing. You may cover the area with a gauze bandage if it weeps or rubs against clothing. Change the bandage every day. · Keep the area clean and dry. When should you call for help? Call 911 anytime you think you may need emergency care. For example, call if:  · You have severe trouble breathing. Call your doctor now or seek immediate medical care if:  · You have a fever. · You feel weaker or more tired than usual.  · You have a yellow tint to your skin or the whites of your eyes. Watch closely for changes in your health, and be sure to contact your doctor if you have any problems. Follow-up care is a key part of your treatment and safety.  Be sure to make and go to all appointments, and call your doctor if you are having problems. It's also a good idea to know your test results and keep a list of the medicines you take. Where can you learn more? Go to http://justyna-octavio.info/. Enter V588 in the search box to learn more about \"Learning About Blood Transfusions. \"  Current as of: May 7, 2018  Content Version: 11.8  © 4629-9022 [employer]. Care instructions adapted under license by [employer] (which disclaims liability or warranty for this information). If you have questions about a medical condition or this instruction, always ask your healthcare professional. [place] [address] any warranty or liability for your use of this information. Patient Education        Learning About High Blood Sugar  What is high blood sugar? Your body turns the food you eat into glucose (sugar), which it uses for energy. But if your body isn't able to use the sugar right away, it can build up in your blood and lead to high blood sugar. When the amount of sugar in your blood stays too high for too much of the time, you may have diabetes. Diabetes is a disease that can cause serious health problems. The good news is that lifestyle changes may help you get your blood sugar back to normal and avoid or delay diabetes. What causes high blood sugar? Sugar (glucose) can build up in your blood if you:  · Are overweight. · Have a family history of diabetes. · Take certain medicines, such as steroids. What are the symptoms? Having high blood sugar may not cause any symptoms at all. Or it may make you feel very thirsty or very hungry. You may also urinate more often than usual, have blurry vision, or lose weight without trying. How is high blood sugar treated? You can take steps to lower your blood sugar level if you understand what makes it get higher. Your doctor may want you to learn how to test your blood sugar level at home.  Then you can see how illness, stress, or different kinds of food or medicine raise or lower your blood sugar level. Other tests may be needed to see if you have diabetes. How can you prevent high blood sugar? · Watch your weight. If you're overweight, losing just a small amount of weight may help. Reducing fat around your waist is most important. · Limit the amount of calories, sweets, and unhealthy fat you eat. Ask your doctor if a dietitian can help you. A registered dietitian can help you create meal plans that fit your lifestyle. · Get at least 30 minutes of exercise on most days of the week. Exercise helps control your blood sugar. It also helps you maintain a healthy weight. Walking is a good choice. You also may want to do other activities, such as running, swimming, cycling, or playing tennis or team sports. · If your doctor prescribed medicines, take them exactly as prescribed. Call your doctor if you think you are having a problem with your medicine. You will get more details on the specific medicines your doctor prescribes. Follow-up care is a key part of your treatment and safety. Be sure to make and go to all appointments, and call your doctor if you are having problems. It's also a good idea to know your test results and keep a list of the medicines you take. Where can you learn more? Go to http://justyna-octavio.info/. Enter O108 in the search box to learn more about \"Learning About High Blood Sugar. \"  Current as of: December 7, 2017  Content Version: 11.8  © 0301-1264 [employer]. Care instructions adapted under license by [employer] (which disclaims liability or warranty for this information). If you have questions about a medical condition or this instruction, always ask your healthcare professional. [address] any warranty or liability for your use of this information.

## 2019-05-05 ENCOUNTER — HOSPITAL ENCOUNTER (EMERGENCY)
Age: 44
Discharge: HOME | End: 2019-05-05
Attending: EMERGENCY MEDICINE
Payer: COMMERCIAL

## 2019-05-05 ENCOUNTER — APPOINTMENT (OUTPATIENT)
Dept: GENERAL RADIOLOGY | Age: 44
End: 2019-05-05
Attending: EMERGENCY MEDICINE
Payer: COMMERCIAL

## 2019-05-05 VITALS
HEART RATE: 112 BPM | BODY MASS INDEX: 21.71 KG/M2 | DIASTOLIC BLOOD PRESSURE: 83 MMHG | RESPIRATION RATE: 20 BRPM | SYSTOLIC BLOOD PRESSURE: 131 MMHG | WEIGHT: 118 LBS | TEMPERATURE: 98.3 F | HEIGHT: 62 IN | OXYGEN SATURATION: 99 %

## 2019-05-05 DIAGNOSIS — L03.011 CELLULITIS OF FINGER OF RIGHT HAND: Primary | ICD-10-CM

## 2019-05-05 PROCEDURE — 99283 EMERGENCY DEPT VISIT LOW MDM: CPT

## 2019-05-05 PROCEDURE — 74011250637 HC RX REV CODE- 250/637: Performed by: EMERGENCY MEDICINE

## 2019-05-05 PROCEDURE — 90715 TDAP VACCINE 7 YRS/> IM: CPT | Performed by: EMERGENCY MEDICINE

## 2019-05-05 PROCEDURE — 90471 IMMUNIZATION ADMIN: CPT

## 2019-05-05 PROCEDURE — 74011000250 HC RX REV CODE- 250: Performed by: EMERGENCY MEDICINE

## 2019-05-05 PROCEDURE — 73130 X-RAY EXAM OF HAND: CPT

## 2019-05-05 PROCEDURE — 74011250636 HC RX REV CODE- 250/636: Performed by: EMERGENCY MEDICINE

## 2019-05-05 PROCEDURE — 75810000451 HC DRAIN ABSC FINGER SIMPLE

## 2019-05-05 RX ORDER — CLINDAMYCIN HYDROCHLORIDE 150 MG/1
600 CAPSULE ORAL
Status: COMPLETED | OUTPATIENT
Start: 2019-05-05 | End: 2019-05-05

## 2019-05-05 RX ORDER — ACETAMINOPHEN 500 MG
1000 TABLET ORAL
Status: COMPLETED | OUTPATIENT
Start: 2019-05-05 | End: 2019-05-05

## 2019-05-05 RX ORDER — CLINDAMYCIN HYDROCHLORIDE 300 MG/1
600 CAPSULE ORAL 4 TIMES DAILY
Qty: 56 CAP | Refills: 0 | Status: SHIPPED | OUTPATIENT
Start: 2019-05-05 | End: 2019-05-12

## 2019-05-05 RX ORDER — LIDOCAINE HYDROCHLORIDE 10 MG/ML
10 INJECTION, SOLUTION EPIDURAL; INFILTRATION; INTRACAUDAL; PERINEURAL ONCE
Status: COMPLETED | OUTPATIENT
Start: 2019-05-05 | End: 2019-05-05

## 2019-05-05 RX ORDER — BACITRACIN 500 UNIT/G
1 PACKET (EA) TOPICAL
Status: COMPLETED | OUTPATIENT
Start: 2019-05-05 | End: 2019-05-05

## 2019-05-05 RX ADMIN — BACITRACIN 1 PACKET: 500 OINTMENT TOPICAL at 15:34

## 2019-05-05 RX ADMIN — TETANUS TOXOID, REDUCED DIPHTHERIA TOXOID AND ACELLULAR PERTUSSIS VACCINE, ADSORBED 0.5 ML: 5; 2.5; 8; 8; 2.5 SUSPENSION INTRAMUSCULAR at 13:52

## 2019-05-05 RX ADMIN — ACETAMINOPHEN 1000 MG: 500 TABLET ORAL at 13:49

## 2019-05-05 RX ADMIN — LIDOCAINE HYDROCHLORIDE 10 ML: 10 INJECTION, SOLUTION EPIDURAL; INFILTRATION; INTRACAUDAL; PERINEURAL at 15:51

## 2019-05-05 RX ADMIN — CLINDAMYCIN HYDROCHLORIDE 600 MG: 150 CAPSULE ORAL at 13:49

## 2019-05-05 NOTE — ED PROVIDER NOTES
36 yo female with h/o DM1 presents with right 5th digit swelling. No known injury. Has been working in her garden and works as a teacher. Denies fevers or chills or other injuries Finger Swelling Pertinent negatives include no back pain. Past Medical History:  
Diagnosis Date  Anemia  Anemia  Diabetes ([address] type 1  Dysmenorrhea  Endometriosis  GERD (gastroesophageal reflux disease)  Mitral valve prolapse  Spontaneous pneumothorax 3/2013 and 8/2013  
 recurrent right--total of 4 episodes, 2 required surgeries, above dates Past Surgical History:  
Procedure Laterality Date  HX DILATION AND CURETTAGE  09/20/2018  HX OTHER SURGICAL  2009  
 ex lap, abdominal for endometriosis  HX OTHER SURGICAL  3/21/2013 Bronch, RVATS, apical bleb rsxn,parietal pleurectomy  HX OTHER SURGICAL  8/21/2013 Bronchoscopy, RVATS and talc pleurodesis Family History:  
Problem Relation Age of Onset  Hypertension Mother  Diabetes Father  Heart Disease Father Social History Socioeconomic History  Marital status:  Spouse name: Not on file  Number of children: Not on file  Years of education: Not on file  Highest education level: Not on file Occupational History  Not on file Social Needs  Financial resource strain: Not on file  Food insecurity:  
  Worry: Not on file Inability: Not on file  Transportation needs:  
  Medical: Not on file Non-medical: Not on file Tobacco Use  Smoking status: Never Smoker  Smokeless tobacco: Never Used Substance and Sexual Activity  Alcohol use: Yes Comment: rarely  Drug use: No  
 Sexual activity: Not on file Lifestyle  Physical activity:  
  Days per week: Not on file Minutes per session: Not on file  Stress: Not on file Relationships  Social connections:  
  Talks on phone: Not on file Gets together: Not on file Attends [employer]ism service: Not on file Active member of club or organization: Not on file Attends meetings of clubs or organizations: Not on file Relationship status: Not on file  Intimate partner violence:  
  Fear of current or ex partner: Not on file Emotionally abused: Not on file Physically abused: Not on file Forced sexual activity: Not on file Other Topics Concern  Not on file Social History Narrative  Not on file ALLERGIES: Patient has no known allergies. Review of Systems Constitutional: Negative for chills and fever. HENT: Negative for ear pain and sore throat. Eyes: Negative for pain. Respiratory: Negative for chest tightness and shortness of breath. Cardiovascular: Negative for chest pain and leg swelling. Gastrointestinal: Negative for abdominal pain, nausea and vomiting. Genitourinary: Negative for dysuria and flank pain. Musculoskeletal: Negative for back pain. Skin: Negative for rash. Neurological: Negative for headaches. All other systems reviewed and are negative. Vitals:  
 05/05/19 1317 BP: 131/83 Pulse: (!) 112 Resp: 20 Temp: 98.3 °F (36.8 °C) SpO2: 99% Weight: 53.5 kg (118 lb) Height: 5' 2\" (1.575 m) Physical Exam  
Constitutional: She appears well-developed and well-nourished. HENT:  
Head: Normocephalic and atraumatic. Right Ear: Tympanic membrane normal.  
Left Ear: Tympanic membrane normal.  
Eyes: No scleral icterus. Neck: No tracheal deviation present. Cardiovascular: Normal rate. Pulmonary/Chest: Effort normal. No respiratory distress. Abdominal: She exhibits no distension. Musculoskeletal: She exhibits no deformity. Neurological: She is alert. Skin: Skin is dry. Swelling and fluctuance of right fifth digit pulp Psychiatric: She has a normal mood and affect. Nursing note and vitals reviewed. MDM Number of Diagnoses or Management Options Cellulitis of finger of right hand:  
Diagnosis management comments: 38 yo female with cellulitis vs. Felon of right 5th digit. I&D performed with little drainage. Treated with Clinda 600mg tid. Advised to follow up with Ortho Hand. Given return precautions. I&D Abcess Simple Date/Time: 5/5/2019 6:36 PM 
Performed by: Joey Jalloh MD 
Authorized by: Joey Jalloh MD  
 
Consent:  
  Consent obtained:  Verbal 
  Consent given by:  Patient Risks discussed:  Bleeding, incomplete drainage, pain, damage to other organs and infection Alternatives discussed:  No treatment Location:  
  Type:  Abscess Location:  Upper extremity Upper extremity location:  Finger Finger location:  R small finger Pre-procedure details:  
  Skin preparation:  Betadine Anesthesia (see MAR for exact dosages): Anesthesia method:  Nerve block Block location:  Digital 
  Block needle gauge:  25 G Block anesthetic:  Lidocaine 1% w/o epi Block injection procedure:  Anatomic landmarks identified, introduced needle, incremental injection, anatomic landmarks palpated and negative aspiration for blood Block outcome:  Anesthesia achieved Procedure type:  
  Complexity:  Simple Procedure details:  
  Incision types:  Single straight Incision depth:  Dermal 
  Scalpel blade:  11 Wound management:  Probed and deloculated Drainage:  Bloody Drainage amount: Moderate Wound treatment:  Wound left open Packing materials:  None Post-procedure details:  
  Patient tolerance of procedure: Tolerated well, no immediate complications Meghna Ku MD

## 2019-05-05 NOTE — DISCHARGE INSTRUCTIONS
Patient Education        Cellulitis: Care Instructions  Your Care Instructions    Cellulitis is a skin infection caused by bacteria, most often strep or staph. It often occurs after a break in the skin from a scrape, cut, bite, or puncture, or after a rash. Cellulitis may be treated without doing tests to find out what caused it. But your doctor may do tests, if needed, to look for a specific bacteria, like methicillin-resistant Staphylococcus aureus (MRSA). The doctor has checked you carefully, but problems can develop later. If you notice any problems or new symptoms, get medical treatment right away. Follow-up care is a key part of your treatment and safety. Be sure to make and go to all appointments, and call your doctor if you are having problems. It's also a good idea to know your test results and keep a list of the medicines you take. How can you care for yourself at home? · Take your antibiotics as directed. Do not stop taking them just because you feel better. You need to take the full course of antibiotics. · Prop up the infected area on pillows to reduce pain and swelling. Try to keep the area above the level of your heart as often as you can. · If your doctor told you how to care for your wound, follow your doctor's instructions. If you did not get instructions, follow this general advice:  ? Wash the wound with clean water 2 times a day. Don't use hydrogen peroxide or alcohol, which can slow healing. ? You may cover the wound with a thin layer of petroleum jelly, such as Vaseline, and a nonstick bandage. ? Apply more petroleum jelly and replace the bandage as needed. · Be safe with medicines. Take pain medicines exactly as directed. ? If the doctor gave you a prescription medicine for pain, take it as prescribed. ? If you are not taking a prescription pain medicine, ask your doctor if you can take an over-the-counter medicine.   To prevent cellulitis in the future  · Try to prevent cuts, scrapes, or other injuries to your skin. Cellulitis most often occurs where there is a break in the skin. · If you get a scrape, cut, mild burn, or bite, wash the wound with clean water as soon as you can to help avoid infection. Don't use hydrogen peroxide or alcohol, which can slow healing. · If you have swelling in your legs (edema), support stockings and good skin care may help prevent leg sores and cellulitis. · Take care of your feet, especially if you have diabetes or other conditions that increase the risk of infection. Wear shoes and socks. Do not go barefoot. If you have athlete's foot or other skin problems on your feet, talk to your doctor about how to treat them. When should you call for help? Call your doctor now or seek immediate medical care if:    · You have signs that your infection is getting worse, such as:  ? Increased pain, swelling, warmth, or redness. ? Red streaks leading from the area. ? Pus draining from the area. ? A fever.     · You get a rash.    Watch closely for changes in your health, and be sure to contact your doctor if:    · You do not get better as expected. Where can you learn more? Go to http://justyna-octavio.info/. Janeth Castillo in the search box to learn more about \"Cellulitis: Care Instructions. \"  Current as of: April 17, 2018  Content Version: 11.9  © 6572-5480 [employer]. Care instructions adapted under license by [employer] (which disclaims liability or warranty for this information). If you have questions about a medical condition or this instruction, always ask your healthcare professional. [place] [address] any warranty or liability for your use of this information. Patient Education        Felon Infection: Care Instructions  Your Care Instructions  An infection of the pad of the finger is called a felon or pulp-space infection. The finger is made up of several small areas of tissue.  Because of this, pus from an infection can build up with no place to go. Then the infection can spread deeper into the finger. Sometimes it can spread into the bone. A finger infection can happen after a cut, a scrape, a puncture, or some other injury. Sometimes the cause isn't known. Your finger may be painful and red. Mild finger infections may be treated with antibiotics alone. You also may soak your finger in warm water. If the infection is deeper or there is a lot of pus, the doctor may open the area to drain the pus. This is sometimes done in an operating room. Follow-up care is a key part of your treatment and safety. Be sure to make and go to all appointments, and call your doctor if you are having problems. It's also a good idea to know your test results and keep a list of the medicines you take. How can you care for yourself at home? · If your doctor prescribed antibiotics, take them as directed. Do not stop taking them just because you feel better. You need to take the full course of antibiotics. · Be safe with medicines. Read and follow all instructions on the label. ? If the doctor gave you a prescription medicine for pain, take it as prescribed. ? If you are not taking a prescription pain medicine, ask your doctor if you can take an over-the-counter medicine. · Prop up your hand on a pillow anytime you sit or lie down during the next 3 days. Try to keep the area above the level of your heart. This will help reduce swelling. · If your doctor told you how to care for your wound, follow your doctor's instructions. If you did not get instructions, follow this general advice:  ? Wash the area with clean water 2 times a day. Don't use hydrogen peroxide or alcohol, which can slow healing. ? Cover with a nonstick bandage. · If the area was packed with gauze:  ? Go to your follow-up appointments to have the gauze changed or removed. ? Your doctor may ask you to remove the gauze.  If so, gently pull out all of the gauze when your doctor tells you to. When should you call for help? Call your doctor now or seek immediate medical care if:    · You have signs that the infection is getting worse, such as:  ? Increased pain, swelling, warmth, or redness. ? Red streaks leading from the area. ? Pus draining from the area. ? A fever.    Watch closely for changes in your health, and be sure to contact your doctor if:    · You do not get better as expected. Where can you learn more? Go to http://justyna-octavio.info/. Enter A986 in the search box to learn more about \"Felon Infection: Care Instructions. \"  Current as of: September 20, 2018  Content Version: 11.9  © 4783-5149 [employer]. Care instructions adapted under license by [employer] (which disclaims liability or warranty for this information). If you have questions about a medical condition or this instruction, always ask your healthcare professional. [place] [address] any warranty or liability for your use of this information.

## 2019-05-05 NOTE — ED NOTES
Dr. Dewey Galarza performed Incision and drainage procedure of the right 5th digit. bacitracin ointment was placed and a tube dressing was applied. No bleeding from the incision was noted.

## 2019-07-16 ENCOUNTER — HOSPITAL ENCOUNTER (INPATIENT)
Age: 44
LOS: 1 days | Discharge: HOME | DRG: 812 | End: 2019-07-17
Attending: STUDENT IN AN ORGANIZED HEALTH CARE EDUCATION/TRAINING PROGRAM | Admitting: INTERNAL MEDICINE
Payer: COMMERCIAL

## 2019-07-16 DIAGNOSIS — D64.9 SYMPTOMATIC ANEMIA: Primary | ICD-10-CM

## 2019-07-16 PROBLEM — D50.9 MICROCYTIC ANEMIA: Status: ACTIVE | Noted: 2019-07-16

## 2019-07-16 PROBLEM — E10.9 TYPE 1 DIABETES MELLITUS (HCC): Chronic | Status: ACTIVE | Noted: 2018-09-26

## 2019-07-16 LAB
BASOPHILS # BLD: 0.1 K/UL (ref 0–0.1)
BASOPHILS NFR BLD: 1 % (ref 0–1)
COMMENT, HOLDF: NORMAL
DIFFERENTIAL METHOD BLD: ABNORMAL
EOSINOPHIL # BLD: 0.2 K/UL (ref 0–0.4)
EOSINOPHIL NFR BLD: 3 % (ref 0–7)
ERYTHROCYTE [DISTWIDTH] IN BLOOD BY AUTOMATED COUNT: 20.7 % (ref 11.5–14.5)
FERRITIN SERPL-MCNC: 20 NG/ML (ref 26–388)
GLUCOSE BLD STRIP.AUTO-MCNC: 138 MG/DL (ref 65–100)
GLUCOSE BLD STRIP.AUTO-MCNC: 74 MG/DL (ref 65–100)
HCT VFR BLD AUTO: 23.5 % (ref 35–47)
HGB BLD-MCNC: 6 G/DL (ref 11.5–16)
IMM GRANULOCYTES # BLD AUTO: 0 K/UL (ref 0–0.04)
IMM GRANULOCYTES NFR BLD AUTO: 0 % (ref 0–0.5)
IRON SATN MFR SERPL: 3 % (ref 20–50)
IRON SERPL-MCNC: 15 UG/DL (ref 35–150)
LYMPHOCYTES # BLD: 1.7 K/UL (ref 0.8–3.5)
LYMPHOCYTES NFR BLD: 24 % (ref 12–49)
MCH RBC QN AUTO: 15.6 PG (ref 26–34)
MCHC RBC AUTO-ENTMCNC: 25.5 G/DL (ref 30–36.5)
MCV RBC AUTO: 61 FL (ref 80–99)
MONOCYTES # BLD: 0.5 K/UL (ref 0–1)
MONOCYTES NFR BLD: 7 % (ref 5–13)
NEUTS SEG # BLD: 4.5 K/UL (ref 1.8–8)
NEUTS SEG NFR BLD: 65 % (ref 32–75)
NRBC # BLD: 0 K/UL (ref 0–0.01)
NRBC BLD-RTO: 0 PER 100 WBC
PLATELET # BLD AUTO: 377 K/UL (ref 150–400)
PMV BLD AUTO: 9.7 FL (ref 8.9–12.9)
RBC # BLD AUTO: 3.85 M/UL (ref 3.8–5.2)
RBC MORPH BLD: ABNORMAL
SAMPLES BEING HELD,HOLD: NORMAL
SERVICE CMNT-IMP: ABNORMAL
SERVICE CMNT-IMP: NORMAL
TIBC SERPL-MCNC: 446 UG/DL (ref 250–450)
WBC # BLD AUTO: 7 K/UL (ref 3.6–11)

## 2019-07-16 PROCEDURE — 99282 EMERGENCY DEPT VISIT SF MDM: CPT

## 2019-07-16 PROCEDURE — 86900 BLOOD TYPING SEROLOGIC ABO: CPT

## 2019-07-16 PROCEDURE — 36430 TRANSFUSION BLD/BLD COMPNT: CPT

## 2019-07-16 PROCEDURE — 65270000029 HC RM PRIVATE

## 2019-07-16 PROCEDURE — 83036 HEMOGLOBIN GLYCOSYLATED A1C: CPT

## 2019-07-16 PROCEDURE — 30233N1 TRANSFUSION OF NONAUTOLOGOUS RED BLOOD CELLS INTO PERIPHERAL VEIN, PERCUTANEOUS APPROACH: ICD-10-PCS | Performed by: STUDENT IN AN ORGANIZED HEALTH CARE EDUCATION/TRAINING PROGRAM

## 2019-07-16 PROCEDURE — 82962 GLUCOSE BLOOD TEST: CPT

## 2019-07-16 PROCEDURE — P9016 RBC LEUKOCYTES REDUCED: HCPCS

## 2019-07-16 PROCEDURE — 82728 ASSAY OF FERRITIN: CPT

## 2019-07-16 PROCEDURE — 86923 COMPATIBILITY TEST ELECTRIC: CPT

## 2019-07-16 PROCEDURE — 83540 ASSAY OF IRON: CPT

## 2019-07-16 PROCEDURE — 36415 COLL VENOUS BLD VENIPUNCTURE: CPT

## 2019-07-16 PROCEDURE — 85025 COMPLETE CBC W/AUTO DIFF WBC: CPT

## 2019-07-16 RX ORDER — INSULIN LISPRO 100 [IU]/ML
INJECTION, SOLUTION INTRAVENOUS; SUBCUTANEOUS
Status: DISCONTINUED | OUTPATIENT
Start: 2019-07-16 | End: 2019-07-17 | Stop reason: HOSPADM

## 2019-07-16 RX ORDER — SODIUM CHLORIDE 0.9 % (FLUSH) 0.9 %
5-40 SYRINGE (ML) INJECTION AS NEEDED
Status: DISCONTINUED | OUTPATIENT
Start: 2019-07-16 | End: 2019-07-17 | Stop reason: HOSPADM

## 2019-07-16 RX ORDER — HYDROMORPHONE HYDROCHLORIDE 1 MG/ML
0.5 INJECTION, SOLUTION INTRAMUSCULAR; INTRAVENOUS; SUBCUTANEOUS
Status: DISCONTINUED | OUTPATIENT
Start: 2019-07-16 | End: 2019-07-17

## 2019-07-16 RX ORDER — ZOLPIDEM TARTRATE 5 MG/1
5 TABLET ORAL
Status: DISCONTINUED | OUTPATIENT
Start: 2019-07-16 | End: 2019-07-17 | Stop reason: HOSPADM

## 2019-07-16 RX ORDER — ENOXAPARIN SODIUM 100 MG/ML
40 INJECTION SUBCUTANEOUS EVERY 24 HOURS
Status: DISCONTINUED | OUTPATIENT
Start: 2019-07-16 | End: 2019-07-17 | Stop reason: ALTCHOICE

## 2019-07-16 RX ORDER — DEXTROSE MONOHYDRATE 100 MG/ML
125-250 INJECTION, SOLUTION INTRAVENOUS AS NEEDED
Status: DISCONTINUED | OUTPATIENT
Start: 2019-07-16 | End: 2019-07-17 | Stop reason: HOSPADM

## 2019-07-16 RX ORDER — BUPROPION HYDROCHLORIDE 75 MG/1
75 TABLET ORAL 2 TIMES DAILY
Status: DISCONTINUED | OUTPATIENT
Start: 2019-07-16 | End: 2019-07-17 | Stop reason: HOSPADM

## 2019-07-16 RX ORDER — BUPROPION HYDROCHLORIDE 75 MG/1
75 TABLET ORAL 2 TIMES DAILY
COMMUNITY

## 2019-07-16 RX ORDER — SODIUM CHLORIDE 9 MG/ML
75 INJECTION, SOLUTION INTRAVENOUS CONTINUOUS
Status: DISCONTINUED | OUTPATIENT
Start: 2019-07-16 | End: 2019-07-17

## 2019-07-16 RX ORDER — PROCHLORPERAZINE EDISYLATE 5 MG/ML
10 INJECTION INTRAMUSCULAR; INTRAVENOUS
Status: DISCONTINUED | OUTPATIENT
Start: 2019-07-16 | End: 2019-07-17 | Stop reason: HOSPADM

## 2019-07-16 RX ORDER — SODIUM CHLORIDE 9 MG/ML
250 INJECTION, SOLUTION INTRAVENOUS AS NEEDED
Status: DISCONTINUED | OUTPATIENT
Start: 2019-07-16 | End: 2019-07-17 | Stop reason: HOSPADM

## 2019-07-16 RX ORDER — PANTOPRAZOLE SODIUM 40 MG/1
40 TABLET, DELAYED RELEASE ORAL
Status: DISCONTINUED | OUTPATIENT
Start: 2019-07-17 | End: 2019-07-17 | Stop reason: HOSPADM

## 2019-07-16 RX ORDER — SODIUM CHLORIDE 0.9 % (FLUSH) 0.9 %
5-40 SYRINGE (ML) INJECTION EVERY 8 HOURS
Status: DISCONTINUED | OUTPATIENT
Start: 2019-07-16 | End: 2019-07-17 | Stop reason: HOSPADM

## 2019-07-16 RX ORDER — INSULIN GLARGINE 100 [IU]/ML
32 INJECTION, SOLUTION SUBCUTANEOUS
Status: DISCONTINUED | OUTPATIENT
Start: 2019-07-16 | End: 2019-07-17 | Stop reason: HOSPADM

## 2019-07-16 RX ORDER — OXYCODONE AND ACETAMINOPHEN 5; 325 MG/1; MG/1
1 TABLET ORAL
Status: DISCONTINUED | OUTPATIENT
Start: 2019-07-16 | End: 2019-07-17

## 2019-07-16 RX ORDER — ACETAMINOPHEN 325 MG/1
650 TABLET ORAL
Status: DISCONTINUED | OUTPATIENT
Start: 2019-07-16 | End: 2019-07-17 | Stop reason: HOSPADM

## 2019-07-16 RX ORDER — MAGNESIUM SULFATE 100 %
4 CRYSTALS MISCELLANEOUS AS NEEDED
Status: DISCONTINUED | OUTPATIENT
Start: 2019-07-16 | End: 2019-07-17 | Stop reason: HOSPADM

## 2019-07-16 NOTE — ED PROVIDER NOTES
37 y.o. female with past medical history significant for anemia, dysmenorrhea, pneumothorax, GERD, anemia, type 1 diabetes, and endometriosis who presents from Endocrinology via private vehicle for further evaluation of low Hgb. Patient saw Endocrinologist ~1 hour ago for routine visit and was told she had Hgb of 5 so she was referred to ED. She has \"heavy periods\" but has not opted for hysterectomy due to work schedule conflict. Her LMP was 7/8/19 and goes through 1 pad per hour. She's had multiple blood transfusions, most recently 11/2018. Specifically denies any other pain or symptoms. There are no other acute medical concerns at this time. Social hx: Never tobacco smoker; Rare EtOH use; Denies illicit drug use  PCP: Emilee Finn MD    Note written by Tempie Aase, Scribe, as dictated by Jose So MD 7:41 PM    The history is provided by the patient. No  was used.         Past Medical History:   Diagnosis Date    Anemia     Anemia     Diabetes ([address]     type 1    Dysmenorrhea     Endometriosis     GERD (gastroesophageal reflux disease)     Mitral valve prolapse     Spontaneous pneumothorax 3/2013 and 8/2013    recurrent right--total of 4 episodes, 2 required surgeries, above dates       Past Surgical History:   Procedure Laterality Date    HX DILATION AND CURETTAGE  09/20/2018    HX OTHER SURGICAL  2009    ex lap, abdominal for endometriosis    HX OTHER SURGICAL  3/21/2013    Bronch, RVATS, apical bleb rsxn,parietal pleurectomy    HX OTHER SURGICAL  8/21/2013    Bronchoscopy, RVATS and talc pleurodesis         Family History:   Problem Relation Age of Onset    Hypertension Mother     Diabetes Father     Heart Disease Father        Social History     Socioeconomic History    Marital status:      Spouse name: Not on file    Number of children: Not on file    Years of education: Not on file    Highest education level: Not on file Occupational History    Not on file   Social Needs    Financial resource strain: Not on file    Food insecurity:     Worry: Not on file     Inability: Not on file    Transportation needs:     Medical: Not on file     Non-medical: Not on file   Tobacco Use    Smoking status: Never Smoker    Smokeless tobacco: Never Used   Substance and Sexual Activity    Alcohol use: Yes     Comment: rarely    Drug use: No    Sexual activity: Not on file   Lifestyle    Physical activity:     Days per week: Not on file     Minutes per session: Not on file    Stress: Not on file   Relationships    Social connections:     Talks on phone: Not on file     Gets together: Not on file     Attends [employer] service: Not on file     Active member of club or organization: Not on file     Attends meetings of clubs or organizations: Not on file     Relationship status: Not on file    Intimate partner violence:     Fear of current or ex partner: Not on file     Emotionally abused: Not on file     Physically abused: Not on file     Forced sexual activity: Not on file   Other Topics Concern    Not on file   Social History Narrative    Not on file         ALLERGIES: Patient has no known allergies. Review of Systems   Constitutional: Negative for activity change, diaphoresis, fatigue and fever. HENT: Negative for congestion and sore throat. Eyes: Negative for photophobia and visual disturbance. Respiratory: Negative for chest tightness and shortness of breath. Cardiovascular: Negative for chest pain, palpitations and leg swelling. Gastrointestinal: Negative for abdominal pain, blood in stool, constipation, diarrhea, nausea and vomiting. Genitourinary: Negative for difficulty urinating, dysuria, flank pain, frequency and hematuria. Musculoskeletal: Negative for back pain. Neurological: Negative for dizziness, syncope, numbness and headaches. All other systems reviewed and are negative.       There were no vitals filed for this visit. Physical Exam   Constitutional: She is oriented to person, place, and time. She appears well-developed and well-nourished. No distress. HENT:   Head: Normocephalic and atraumatic. Nose: Nose normal.   Mouth/Throat: Oropharynx is clear and moist. No oropharyngeal exudate. Eyes: Conjunctivae and EOM are normal. Right eye exhibits no discharge. Left eye exhibits no discharge. No scleral icterus. Neck: Normal range of motion. Neck supple. No JVD present. No tracheal deviation present. No thyromegaly present. Cardiovascular: Normal rate, regular rhythm and intact distal pulses. Exam reveals no gallop and no friction rub. Murmur heard. Systolic murmur is present with a grade of 2/6. Has 2/6 systolic murmur at right sternal border. Pulmonary/Chest: Effort normal and breath sounds normal. No stridor. No respiratory distress. She has no wheezes. She has no rales. She exhibits no tenderness. Abdominal: Bowel sounds are normal. She exhibits no distension and no mass. There is no tenderness. There is no rebound. Musculoskeletal: Normal range of motion. She exhibits no edema or tenderness. Lymphadenopathy:     She has no cervical adenopathy. Neurological: She is alert and oriented to person, place, and time. No cranial nerve deficit. Coordination normal.   Skin: Skin is warm and dry. No rash noted. She is not diaphoretic. No erythema. There is pallor. Pt is pale. Psychiatric: She has a normal mood and affect. Her behavior is normal. Judgment and thought content normal.   Nursing note and vitals reviewed.      Note written by Levy López, as dictated by Macey Silva MD 7:41 PM    MDM  Number of Diagnoses or Management Options  Symptomatic anemia:      Amount and/or Complexity of Data Reviewed  Clinical lab tests: ordered and reviewed  Review and summarize past medical records: yes  Discuss the patient with other providers: yes  Independent visualization of images, tracings, or specimens: yes    Risk of Complications, Morbidity, and/or Mortality  Presenting problems: moderate  Diagnostic procedures: moderate  Management options: moderate    Critical Care  Total time providing critical care: 30-74 minutes    Patient Progress  Patient progress: stable     35 minutes    Procedures

## 2019-07-16 NOTE — ED TRIAGE NOTES
Patient saw endocrinologist today and was called and told that her hemoglobin was 5 and to come to the ER. Has hx of anemia. Patient has heavy periods normally.

## 2019-07-17 VITALS
TEMPERATURE: 98.1 F | WEIGHT: 118 LBS | HEIGHT: 62 IN | DIASTOLIC BLOOD PRESSURE: 73 MMHG | RESPIRATION RATE: 16 BRPM | HEART RATE: 97 BPM | OXYGEN SATURATION: 99 % | SYSTOLIC BLOOD PRESSURE: 108 MMHG | BODY MASS INDEX: 21.71 KG/M2

## 2019-07-17 LAB
ANION GAP SERPL CALC-SCNC: 6 MMOL/L (ref 5–15)
BUN SERPL-MCNC: 12 MG/DL (ref 6–20)
BUN/CREAT SERPL: 19 (ref 12–20)
CALCIUM SERPL-MCNC: 8.5 MG/DL (ref 8.5–10.1)
CHLORIDE SERPL-SCNC: 107 MMOL/L (ref 97–108)
CO2 SERPL-SCNC: 25 MMOL/L (ref 21–32)
CREAT SERPL-MCNC: 0.62 MG/DL (ref 0.55–1.02)
ERYTHROCYTE [DISTWIDTH] IN BLOOD BY AUTOMATED COUNT: 25.7 % (ref 11.5–14.5)
EST. AVERAGE GLUCOSE BLD GHB EST-MCNC: 226 MG/DL
GLUCOSE BLD STRIP.AUTO-MCNC: 275 MG/DL (ref 65–100)
GLUCOSE BLD STRIP.AUTO-MCNC: 311 MG/DL (ref 65–100)
GLUCOSE SERPL-MCNC: 262 MG/DL (ref 65–100)
HBA1C MFR BLD: 9.5 % (ref 4.2–6.3)
HCT VFR BLD AUTO: 24.9 % (ref 35–47)
HCT VFR BLD AUTO: 31.6 % (ref 35–47)
HGB BLD-MCNC: 6.8 G/DL (ref 11.5–16)
HGB BLD-MCNC: 9.1 G/DL (ref 11.5–16)
MAGNESIUM SERPL-MCNC: 2.1 MG/DL (ref 1.6–2.4)
MCH RBC QN AUTO: 17.7 PG (ref 26–34)
MCHC RBC AUTO-ENTMCNC: 27.3 G/DL (ref 30–36.5)
MCV RBC AUTO: 64.8 FL (ref 80–99)
NRBC # BLD: 0 K/UL (ref 0–0.01)
NRBC BLD-RTO: 0 PER 100 WBC
PHOSPHATE SERPL-MCNC: 3.3 MG/DL (ref 2.6–4.7)
PLATELET # BLD AUTO: 323 K/UL (ref 150–400)
POTASSIUM SERPL-SCNC: 3.5 MMOL/L (ref 3.5–5.1)
RBC # BLD AUTO: 3.84 M/UL (ref 3.8–5.2)
SERVICE CMNT-IMP: ABNORMAL
SERVICE CMNT-IMP: ABNORMAL
SODIUM SERPL-SCNC: 138 MMOL/L (ref 136–145)
WBC # BLD AUTO: 19.1 K/UL (ref 3.6–11)

## 2019-07-17 PROCEDURE — 74011000258 HC RX REV CODE- 258: Performed by: INTERNAL MEDICINE

## 2019-07-17 PROCEDURE — 74011250637 HC RX REV CODE- 250/637: Performed by: INTERNAL MEDICINE

## 2019-07-17 PROCEDURE — 36430 TRANSFUSION BLD/BLD COMPNT: CPT

## 2019-07-17 PROCEDURE — 74011250636 HC RX REV CODE- 250/636: Performed by: INTERNAL MEDICINE

## 2019-07-17 PROCEDURE — 83735 ASSAY OF MAGNESIUM: CPT

## 2019-07-17 PROCEDURE — 85018 HEMOGLOBIN: CPT

## 2019-07-17 PROCEDURE — 74011636637 HC RX REV CODE- 636/637: Performed by: INTERNAL MEDICINE

## 2019-07-17 PROCEDURE — 85027 COMPLETE CBC AUTOMATED: CPT

## 2019-07-17 PROCEDURE — 82962 GLUCOSE BLOOD TEST: CPT

## 2019-07-17 PROCEDURE — 84100 ASSAY OF PHOSPHORUS: CPT

## 2019-07-17 PROCEDURE — 83021 HEMOGLOBIN CHROMOTOGRAPHY: CPT

## 2019-07-17 PROCEDURE — P9016 RBC LEUKOCYTES REDUCED: HCPCS

## 2019-07-17 PROCEDURE — 80048 BASIC METABOLIC PNL TOTAL CA: CPT

## 2019-07-17 PROCEDURE — 36415 COLL VENOUS BLD VENIPUNCTURE: CPT

## 2019-07-17 RX ORDER — SODIUM CHLORIDE 9 MG/ML
250 INJECTION, SOLUTION INTRAVENOUS AS NEEDED
Status: DISCONTINUED | OUTPATIENT
Start: 2019-07-17 | End: 2019-07-17 | Stop reason: HOSPADM

## 2019-07-17 RX ORDER — SODIUM FERRIC GLUCONATE COMPLEX IN SUCROSE 12.5 MG/ML
125 INJECTION INTRAVENOUS ONCE
Status: DISCONTINUED | OUTPATIENT
Start: 2019-07-17 | End: 2019-07-17

## 2019-07-17 RX ADMIN — SODIUM CHLORIDE 125 MG: 900 INJECTION, SOLUTION INTRAVENOUS at 07:00

## 2019-07-17 RX ADMIN — PANTOPRAZOLE SODIUM 40 MG: 40 TABLET, DELAYED RELEASE ORAL at 06:59

## 2019-07-17 RX ADMIN — INSULIN LISPRO 4 UNITS: 100 INJECTION, SOLUTION INTRAVENOUS; SUBCUTANEOUS at 08:19

## 2019-07-17 RX ADMIN — Medication 10 ML: at 07:00

## 2019-07-17 RX ADMIN — BUPROPION HYDROCHLORIDE 75 MG: 75 TABLET, FILM COATED ORAL at 08:19

## 2019-07-17 RX ADMIN — Medication 10 ML: at 00:01

## 2019-07-17 RX ADMIN — SODIUM CHLORIDE 75 ML/HR: 900 INJECTION, SOLUTION INTRAVENOUS at 00:01

## 2019-07-17 RX ADMIN — INSULIN LISPRO 3 UNITS: 100 INJECTION, SOLUTION INTRAVENOUS; SUBCUTANEOUS at 11:58

## 2019-07-17 RX ADMIN — BUPROPION HYDROCHLORIDE 75 MG: 75 TABLET, FILM COATED ORAL at 00:00

## 2019-07-17 NOTE — PROGRESS NOTES
[facility]    Medical Progress Note      NAME: Eliu Em   :  1975  MRM:  [id number]    Date/Time: 2019  8:34 AM          Assessment and Plan:     Microcytic anemia - Presumed chronic iiron deficiency due to chronic blood loss. Microcytic, but checking for hemoglobinopathy, possibly thalassemia. She has already being referred to Hem/Onc as an outpatient by her endocrinologist. Hb 6.8 after 1st transfusion, but ordered 2nd unit. Can DC home if Hb>7 after that.     Menorrhagia - POA, recurrent, severe per patient. Overdue to see GYN eval.  She will see them outpatient.     Type 1 diabetes mellitus without complications - Labile. Diabetic diet and counseling. SSI per protocol. Continue home Lantus. A1c useless in setting of anemia. Depression and anxiety - continue bupropion. Fatigue - POA, due to anemia, better after transfusion. Leukocytosis - Likely reactive, but no other sign of a transfusion reaction or infection. Subjective:     Chief Complaint:  No complaints. Slept well. ROS:  (bold if positive, if negative)    Tolerating PT  Tolerating Diet        Objective:     Last 24hrs VS reviewed since prior progress note.  Most recent are:    Visit Vitals  /72 (BP 1 Location: Left arm, BP Patient Position: At rest)   Pulse (!) 108   Temp 98.2 °F (36.8 °C)   Resp 16   Ht 5' 2\" (1.575 m)   Wt 53.5 kg (118 lb)   SpO2 98%   BMI 21.58 kg/m²     SpO2 Readings from Last 6 Encounters:   19 98%   19 99%   01/10/19 100%   18 98%   17 97%   17 99%            Intake/Output Summary (Last 24 hours) at 2019 0834  Last data filed at 2019 0655  Gross per 24 hour   Intake 550 ml   Output    Net 550 ml        Physical Exam:    Gen:  Thin, in no acute distress  HEENT:  Pale conjunctivae, PERRL, hearing intact to voice, moist mucous membranes  Neck:  Supple, without masses, thyroid non-tender  Resp:  No accessory muscle use, clear breath sounds without wheezes rales or rhonchi  Card:  No murmurs, tachycardic S1, S2 without thrills, bruits or peripheral edema  Abd:  Soft, non-tender, non-distended, normoactive bowel sounds are present, no mass  Lymph:  No cervical or inguinal adenopathy  Musc:  No cyanosis or clubbing  Skin:  No rashes or ulcers, skin turgor is good  Neuro:  Cranial nerves are grossly intact, no focal motor weakness, follows commands appropriately  Psych:  Good insight, oriented to person, place and time, alert    Telemetry reviewed:   normal sinus rhythm  __________________________________________________________________  Medications Reviewed: (see below)  Medications:     Current Facility-Administered Medications   Medication Dose Route Frequency    0.9% sodium chloride infusion 250 mL  250 mL IntraVENous PRN    0.9% sodium chloride infusion 250 mL  250 mL IntraVENous PRN    sodium chloride (NS) flush 5-40 mL  5-40 mL IntraVENous Q8H    sodium chloride (NS) flush 5-40 mL  5-40 mL IntraVENous PRN    acetaminophen (TYLENOL) tablet 650 mg  650 mg Oral Q4H PRN    prochlorperazine (COMPAZINE) injection 10 mg  10 mg IntraVENous Q6H PRN    zolpidem (AMBIEN) tablet 5 mg  5 mg Oral QHS PRN    insulin lispro (HUMALOG) injection   SubCUTAneous QID WITH MEALS    glucose chewable tablet 16 g  4 Tab Oral PRN    glucagon (GLUCAGEN) injection 1 mg  1 mg IntraMUSCular PRN    dextrose 10% infusion 125-250 mL  125-250 mL IntraVENous PRN    buPROPion (WELLBUTRIN) tablet 75 mg  75 mg Oral BID    insulin glargine (LANTUS) injection 32 Units  32 Units SubCUTAneous QHS    pantoprazole (PROTONIX) tablet 40 mg  40 mg Oral ACB        Lab Data Reviewed: (see below)  Lab Review:     Recent Labs     07/17/19 0525 07/16/19 1954   WBC 19.1* 7.0   HGB 6.8* 6.0*   HCT 24.9* 23.5*    377     Recent Labs     07/17/19 0525      K 3.5      CO2 25   *   BUN 12   CREA 0.62   CA 8.5   MG 2.1   PHOS 3.3     Lab Results   Component Value Date/Time    Glucose (POC) 311 (H) 07/17/2019 07:14 AM    Glucose (POC) 138 (H) 07/16/2019 10:42 PM    Glucose (POC) 74 07/16/2019 10:17 PM    Glucose (POC) 264 (H) 01/10/2019 10:38 PM    Glucose (POC) 291 (H) 01/10/2019 08:23 PM     No results for input(s): PH, PCO2, PO2, HCO3, FIO2 in the last 72 hours. No results for input(s): INR in the last 72 hours. No lab exists for component: INREXT  All Micro Results     None          Other pertinent lab: none    Total time spent with patient: 39 Minutes I reviewed chart, notes, data and current medications in the medical record. I have examined and treated the patient at bedside during this period.                  Care Plan discussed with: Patient, Care Manager, Nursing Staff and >50% of time spent in counseling and coordination of care    Discussed:  Care Plan and D/C Planning    Prophylaxis:  H2B/PPI    Disposition:  Home w/Family           ___________________________________________________    Attending Physician: Vijay Cole MD

## 2019-07-17 NOTE — PROGRESS NOTES
2157  TRANSFER - IN REPORT:    Verbal report received from [address], [address] [place] [address] (name) on Marisol Hatfield  being received from ED (unit) for routine progression of care      Report consisted of patients Situation, Background, Assessment and   Recommendations(SBAR). Information from the following report(s) SBAR, Kardex, Intake/Output, MAR, Recent Results and Med Rec Status was reviewed with the receiving nurse. Opportunity for questions and clarification was provided. Assessment completed upon patients arrival to unit and care assumed.

## 2019-07-17 NOTE — PROGRESS NOTES
0730  Bedside and Verbal shift change report given to Renard Felty, RN (oncoming nurse) by Hodan Parks RN (offgoing nurse). Report included the following information SBAR, Kardex, Intake/Output, MAR, Recent Results and Med Rec Status. 2236  Pt's POC glucose 74. Hypoglycemic protocol followed. Lantus ordered for tonight. Dr. Lucas Treviño called and made aware. HYPOGLYCEMIC EPISODE DOCUMENTATION    Patient with hypoglycemic episode(s) at 7/16/19 (time) on 7/16/19(date). BG value(s) pre-treatment 76    Was patient symptomatic?  [] yes, [x] no  Patient was treated with the following rescue medications/treatments: [] D50                [] Glucose tablets                [x] Glucagon                [x] 4oz juice                [] 6oz reg soda                [] 8oz low fat milk  BG value post-treatment: 138  Once BG treated and value greater than 80mg/dl, pt was provided with the following:  [] snack  [x] meal  Name of MD notified: Lucas Treviño  The following orders were received:

## 2019-07-17 NOTE — H&P
[address] 19  [phone]    Admission History and Physical      NAME:  Tania Sullivan   :   1975   MRN:  [id number]     PCP:  Alyssa Willingham MD     Date/Time:  2019         Subjective:     CHIEF COMPLAINT: fatigue     HISTORY OF PRESENT ILLNESS:     Ms. Catalina Farr is a 37 y.o.  female who is admitted with anemia. Ms. Catalina Farr presented to the Emergency Department this PM complaining of fatigue: for the past month, constant, progressively increasing, worse with exertion, similar to symptoms she had last year when she was admitted with anemia    History obtained from mother, chart review and the patient. Previous records reviewed, admit last year for anemia requiring transfusion     Past Medical History:   Diagnosis Date    Anemia     Anemia     Diabetes ([address]     type 1    Dysmenorrhea     Endometriosis     GERD (gastroesophageal reflux disease)     Mitral valve prolapse     Spontaneous pneumothorax 3/2013 and 2013    recurrent right--total of 4 episodes, 2 required surgeries, above dates        Past Surgical History:   Procedure Laterality Date    HX DILATION AND CURETTAGE  2018    HX OTHER SURGICAL      ex lap, abdominal for endometriosis    HX OTHER SURGICAL  3/21/2013    Bronch, RVATS, apical bleb rsxn,parietal pleurectomy    HX OTHER SURGICAL  2013    Bronchoscopy, RVATS and talc pleurodesis       Social History     Tobacco Use    Smoking status: Never Smoker    Smokeless tobacco: Never Used   Substance Use Topics    Alcohol use: Yes     Comment: rarely        Family History   Problem Relation Age of Onset    Hypertension Mother     Diabetes Father     Heart Disease Father     Anemia Father         No Known Allergies     Prior to Admission medications    Medication Sig Start Date End Date Taking?  Authorizing Provider   buPROPion [facility]) 75 mg tablet Take 75 mg by mouth two (2) times a day. Yes Provider, Historical   multivit-minerals/folic acid (WOMENS DAILY GUMMIES PO) Take 1 g by mouth daily. Yes Provider, Historical   insulin degludec (TRESIBA FLEXTOUCH U-100) 100 unit/mL (3 mL) inpn 32 Units by SubCUTAneous route nightly. Yes Provider, Historical   omeprazole (PRILOSEC) 20 mg capsule Take 20 mg by mouth daily as needed. Yes Provider, Historical   ferrous sulfate 325 mg (65 mg iron) CpER Take 325 mg by mouth three (3) times daily. Yes Provider, Historical   insulin aspart (NOVOLOG FLEXPEN) 100 unit/mL inpn by SubCUTAneous route Before breakfast, lunch, and dinner.  Sliding scale    Provider, Historical         Review of Systems:  (bold if positive, if negative)    Gen:  fatigueEyes:  ENT:  CVS:  Pulm:  dyspneaGI:    :    MS:  Skin:  Psych:  Endo:    Hem:  Renal:    Neuro:            Objective:      VITALS:    Vital signs reviewed; most recent are:    Visit Vitals  BP (!) 134/92 (BP 1 Location: Right arm, BP Patient Position: Sitting)   Pulse (!) 106   Temp 98.6 °F (37 °C)   Resp 18   Ht 5' 2\" (1.575 m)   Wt 53.5 kg (118 lb)   SpO2 100%   BMI 21.58 kg/m²     SpO2 Readings from Last 6 Encounters:   07/16/19 100%   05/05/19 99%   01/10/19 100%   09/27/18 98%   03/30/17 97%   03/22/17 99%        No intake or output data in the 24 hours ending 07/16/19 1014         Exam:     Physical Exam:    Gen: Well-developed, well-nourished, in no acute distress  HEENT:  Pale conjunctivae, PERRL, hearing intact to voice, moist mucous membranes  Neck: Supple, without masses, thyroid non-tender  Resp: No accessory muscle use, clear breath sounds without wheezes rales or rhonchi  Card: 2/6 systolic murmur, normal S1, S2 without thrills, bruits or peripheral edema, 2+ LE peripheral pulses  Abd:  Soft, non-tender, non-distended, normoactive bowel sounds are present, no palpable organomegaly and no detectable hernias  Lymph:  No cervical or inguinal adenopathy  Musc: No cyanosis or clubbing  Skin: No rashes or ulcers, skin turgor is good, cap refill <2 sec  Neuro:  Cranial nerves are grossly intact, no focal motor weakness, follows commands appropriately  Psych:  Good insight, oriented to person, place and time, alert             Labs:    Recent Labs     07/16/19 1954   WBC 7.0   HGB 6.0*   HCT 23.5*        No results for input(s): NA, K, CL, CO2, GLU, BUN, CREA, CA, MG, PHOS, ALB, TBIL, TBILI, SGOT, ALT in the last 72 hours. Lab Results   Component Value Date/Time    Glucose (POC) 264 (H) 01/10/2019 10:38 PM    Glucose (POC) 291 (H) 01/10/2019 08:23 PM     No results for input(s): PH, PCO2, PO2, HCO3, FIO2 in the last 72 hours. No results for input(s): INR in the last 72 hours.     No lab exists for component: INREXT, INREXT       Assessment/Plan:       Principal Problem:    Microcytic anemia (7/16/2019)    - suspect iron deficiency due to chronic blood loss, iron studies were not checked last admit   - looking back at her labs, she was been fairly anemic and always microcytic and while this could all be from chronic iron deficiency I have to wonder if she has a hemoglobinopathy, possibly thalassemia (she states her father had a history of anemia from a young age)   - will send Hgb electrophoresis in addition to iron studies and she is already being referred to Hem/Onc as an outpatient by her endocrinologist   - transfuse one unit and follow Hgb, goal >7    Active Problems:    Menorrhagia (9/26/2018)   - fairly severe per patient, she is overdue to see GYN and has been putting off having this taken care of because of her job   - outpatient follow up with GYN      Type 1 diabetes mellitus ([facility] [address] [address] (2/81/2379)   - no complications but BS here shows she has poor control, elevated A1c last admit even with severe anemia   - follow on home insulin with SSI       Code status:   - patient is FULL CODE, no AMD on file,  Lucy Webster is surrogate      Total time spent on patient care: [phone] [address] discussed with: Patient, Family, Nursing Staff and Dr. Desir Shows    Discussed:  Code Status, Care Plan and D/C Planning    Prophylaxis:  Lovenox and SCD's    Probable Disposition:  Home w/Family           ___________________________________________________    Attending Physician: Elsie Odonnell MD

## 2019-07-17 NOTE — PROGRESS NOTES
Discharge order noted. Blood being drawn, pt aware of discharge order. Patient's mother driving to hospital now, will discharge patient if lab work comes back with hgb above 7 per MD order. 1130: Patient's blood work received and patient cleared for discharge. Patient given discharge instructions and prescription for iron. Patient verbalizes understanding of discharge instructions and medication and follow up. PIV x2 removed. Patient dressing self without assistance. Patient awaiting mother for ride home. 1215: Patient's mother arrived. Patient ate lunch. Patient out of unit in wheelchair, mother driving patient to home.

## 2019-07-17 NOTE — PROGRESS NOTES
CM Note:  Met with pt for d/c planning. She stated she was independent with ADL's, was driving and walked unaided. She gets her medications from [facility] on [address] [facility] in [place] [address]. Reason for Admission:   Microcytic anemia                   RRAT Score:     8                Plan for utilizing home health:   none                       Current Advanced Directive/Advance Care Plan: full code                         Transition of Care Plan:     1. To be d/c'd if her next Hgb is >7.  2. Mother to transport her. 3. Pt to f/u with providers. FLOYD Willis    Care Management Interventions  PCP Verified by CM: Yes(Dr. Mckinley Dorman. No NN.)  Palliative Care Criteria Met (RRAT>21 & CHF Dx)?: No  Mode of Transport at Discharge:  Other (see comment)(patient's mother)  MyChart Signup: No  Discharge Durable Medical Equipment: No  Physical Therapy Consult: No  Occupational Therapy Consult: No  Speech Therapy Consult: No  Current Support Network: Lives with Spouse, Own Home(1 story house with 4 entry steps.)  Plan discussed with Pt/Family/Caregiver: Yes  Discharge Location  Discharge Placement: Home with one level

## 2019-07-17 NOTE — ED NOTES
TRANSFER - OUT REPORT:    Verbal report given to Tatiana(name) on [place]  being transferred to 5th floor(unit) for routine progression of care       Report consisted of patients Situation, Background, Assessment and   Recommendations(SBAR). Information from the following report(s) SBAR, ED Summary and MAR was reviewed with the receiving nurse. Lines:   Peripheral IV 07/16/19 Right Antecubital (Active)   Site Assessment Clean, dry, & intact 7/16/2019  8:00 PM   Phlebitis Assessment 0 7/16/2019  8:00 PM   Infiltration Assessment 0 7/16/2019  8:00 PM   Dressing Status Clean, dry, & intact 7/16/2019  8:00 PM   Dressing Type Transparent 7/16/2019  8:00 PM   Hub Color/Line Status Pink;Flushed 7/16/2019  8:00 PM   Action Taken Blood drawn 7/16/2019  8:00 PM        Opportunity for questions and clarification was provided.       Patient transported with:   [employer]

## 2019-07-17 NOTE — DISCHARGE SUMMARY
Physician Discharge Summary     Patient ID:  Jono Horowitz  [id number]  37 y.o.  1975    Admit date: 7/16/2019    Discharge date and time: 7/17/2019    Admission Diagnoses: Microcytic anemia [D50.9]    Discharge Diagnoses:    Principal Diagnosis   Microcytic anemia                                             Other Diagnoses  Principal Problem:    Menorrhagia (9/26/2018)    Type 1 diabetes mellitus ([address] (9/26/2018)    Depression Anxiety     Hospital Course:   Microcytic anemia - Presumed chronic iiron deficiency due to chronic blood loss. Microcytic, but checking for hemoglobinopathy, possibly thalassemia. She has already being referred to Hem/Onc as an outpatient by her endocrinologist. Hb 6.8 after 1st transfusion, but ordered 2nd unit. Can DC home if Hb>7 after that.     Menorrhagia - POA, recurrent, severe per patient. Overdue to see GYN eval.  She will see them outpatient.     Type 1 diabetes mellitus without complications - Labile. Diabetic diet and counseling. SSI per protocol. Continue home Lantus. A1c useless in setting of anemia.     Depression and anxiety - continue bupropion.     Fatigue - POA, due to anemia, better after transfusion.     Leukocytosis - Likely reactive, but no other sign of a transfusion reaction or infection.     PCP: Lidya Valdivia MD    Consults: None    Significant Diagnostic Studies: See Hospital Course    Discharged home in improved condition.     Discharge Exam:  /72 (BP 1 Location: Left arm, BP Patient Position: At rest)   Pulse (!) 108   Temp 98.2 °F (36.8 °C)   Resp 16   Ht 5' 2\" (1.575 m)   Wt 53.5 kg (118 lb)   SpO2 98%   BMI 21.58 kg/m²      Gen:  Thin, in no acute distress  HEENT:  Pale conjunctivae, PERRL, hearing intact to voice, moist mucous membranes  Neck:  Supple, without masses, thyroid non-tender  Resp:  No accessory muscle use, clear breath sounds without wheezes rales or rhonchi  Card:  No murmurs, tachycardic S1, S2 without thrills, bruits or peripheral edema  Abd:  Soft, non-tender, non-distended, normoactive bowel sounds are present, no mass  Lymph:  No cervical or inguinal adenopathy  Musc:  No cyanosis or clubbing  Skin:  No rashes or ulcers, skin turgor is good  Neuro:  Cranial nerves are grossly intact, no focal motor weakness, follows commands appropriately  Psych:  Good insight, oriented to person, place and time, alert    Patient Instructions:   Current Discharge Medication List      CONTINUE these medications which have CHANGED    Details   ferrous sulfate 325 mg (65 mg iron) cpER Take 325 mg by mouth every other day for 90 days. Qty: 45 Cap, Refills: 0         CONTINUE these medications which have NOT CHANGED    Details   buPROPion (WELLBUTRIN) 75 mg tablet Take 75 mg by mouth two (2) times a day. multivit-minerals/folic acid (WOMENS DAILY GUMMIES PO) Take 1 g by mouth daily. insulin degludec (TRESIBA FLEXTOUCH U-100) 100 unit/mL (3 mL) inpn 32 Units by SubCUTAneous route nightly. omeprazole (PRILOSEC) 20 mg capsule Take 20 mg by mouth daily as needed. insulin aspart (NOVOLOG FLEXPEN) 100 unit/mL inpn by SubCUTAneous route Before breakfast, lunch, and dinner. Sliding scale           Activity: Activity as tolerated  Diet: Regular Diet  Wound Care: None needed    Follow-up with your PCP in a few weeks.   Follow-up tests/labs - none    Signed:  April Mancuso MD  7/17/2019  8:42 AM

## 2019-07-17 NOTE — PROGRESS NOTES
[facility]: MED RECONCILIATION    Medications added:   Bupropion- pt takes on and off. She now has new prescription for this as of today (7/16/19) and wants to take while admitted    Information obtained from: Patient (alert, oriented, good historian), [facility]      Allergies: Patient has no known allergies. Prior to Admission Medications:     Medication Documentation Review Audit       Reviewed by Wolfgang Abarca, PHARMD (Pharmacist) on 07/16/19 at 2140      Medication Sig Documenting Provider Last Dose Status Taking? buPROPion (WELLBUTRIN) 75 mg tablet Take 75 mg by mouth two (2) times a day. Provider, Historical  Active Yes   ferrous sulfate 325 mg (65 mg iron) CpER Take 325 mg by mouth three (3) times daily. Provider, Historical 7/16/2019 AM Active Yes           Med Note (Floridalma Fisher. Wed Sep 26, 2018  7:33 PM) Pilar Galvin Medroxyprogesterone 10mg TID every 28 days during menstrual cycle. Not currently taking at this time   insulin aspart (NOVOLOG FLEXPEN) 100 unit/mL inpn by SubCUTAneous route Before breakfast, lunch, and dinner. Sliding scale Provider, Historical  Active            Med Note (Floridalma Fisher. Wed Sep 26, 2018  7:31 PM)     insulin degludec (TRESIBA FLEXTOUCH U-100) 100 unit/mL (3 mL) inpn 32 Units by SubCUTAneous route nightly. Provider, Historical 7/15/2019 PM Active Yes   multivit-minerals/folic acid (WOMENS DAILY GUMMIES PO) Take 1 g by mouth daily. Provider, Historical 7/16/2019 AM Active Yes   omeprazole (PRILOSEC) 20 mg capsule Take 20 mg by mouth daily as needed. Provider, Historical  Active Yes                    Oscar Lei.  Lazarus Stabile

## 2019-07-17 NOTE — DIABETES MGMT
[facility]    [facility] Progress Note    Recommendations/ Comments: Chart reviewed for variable BG. Noted patient with hypoglycemia when admitted to floor last night (74 mg/dL). Her basal insulin was held last night due to hypoglycemia. Patient with T1DM.  mg/dL this morning. If appropriate, please consider: DTC will continue to monitor. Current hospital DM medication:   Lantus 32 units nightly-held 7/16/19  Lispro high sensitivity correction scale    Chart reviewed on [facility] Hunt. Patient is a 37 y.o. female with known Type 1 Diabetes on insulin injections: Novolog TID AC per scale; Tresiba 32 units nightly at home. A1c:   Lab Results   Component Value Date/Time    Hemoglobin A1c 9.5 (H) 07/16/2019 07:54 PM    Hemoglobin A1c 10.2 (H) 09/27/2018 04:11 AM       Recent Glucose Results:   Lab Results   Component Value Date/Time     (H) 07/17/2019 05:25 AM    GLUCPOC 311 (H) 07/17/2019 07:14 AM    GLUCPOC 138 (H) 07/16/2019 10:42 PM    GLUCPOC 74 07/16/2019 10:17 PM        Lab Results   Component Value Date/Time    Creatinine 0.62 07/17/2019 05:25 AM     Estimated Creatinine Clearance: 92.5 mL/min (based on SCr of 0.62 mg/dL). Active Orders   Diet    DIET CARDIAC Regular; Consistent Carb 1800kcal        PO intake: No data found. Will continue to follow as needed.     Thank you  Derrick Trujillo RN  Office [phone]  Pager [phone]          Time spent: 6 min

## 2019-07-17 NOTE — DISCHARGE INSTRUCTIONS
Patient Discharge Instructions    Claudeen Poles / [id number] : 1975    Admitted 2019 Discharged: 2019     Primary Diagnoses  Problem List as of 2019 Date Reviewed: 2019           * (Principal) Microcytic anemia   Anemia   Menorrhagia   Type 1 diabetes mellitus ([facility] [address] (Chronic)   Kidney stone          Take Home Medications          · It is important that you take the medication exactly as they are prescribed. · Keep your medication in the bottles provided by the pharmacist and keep a list of the medication names, dosages, and times to be taken in your wallet. · Do not take other medications without consulting your doctor. What to do at Home    Recommended diet: Regular Diet    Recommended activity: Activity as tolerated    If you experience worse symptoms, please follow up with hematology. Follow-up with your PCP in a few week        Information obtained by :  I understand that if any problems occur once I am at home I am to contact my physician. I understand and acknowledge receipt of the instructions indicated above.                                                                                                                                            Physician's or R.N.'s Signature                                                                  Date/Time                                                                                                                                              Patient or Representative Signature                                                          Date/Time

## 2019-07-17 NOTE — DIABETES MGMT
[facility]    Elevated A1C Visit Note    Recommendations/ Comments: Patient seen for elevated A1c, unknown true A1c value in light of symptomatic anemia requiring blood transfusions this admission. Patient is followed by Dr. Casie Ayon, endocrinology, for T1DM, diagnosed May 2015. She reports her last visit was yesterday. Patient states she started using the Freestyle Corinna in May 2019, because she was unable to test her blood sugar regularly during the day as a . She was having lows in the morning, and then postprandial hyperglycemic excursions into the 300s. She currently checks ACHS, and more frequently as needed. Patient reports she her target BG goals from Dr. Casie Ayon are less than 150 mg/dL. Reviewed preprandial and postprandial targets, as well as recommended carbohydrate servings using Balanced Plate method. Patient eats 3 meals daily. Patient reports trying to eat healthy meals, but she does consume sugar-sweetened beverages in varying amounts. Patient has found some sugar-free alternatives (diet soda, Powerade zero) she enjoys as well. She plans to eliminate all sugary beverages in the future. Current hospital DM medication:   Lantus 32 units nightly-held 7/16/19  Lispro high sensitivity correction scale    Patient is a 37 y.o. female with known Type 1 Diabetes on insulin injections: Novolog TID AC per scale; Tresiba 32 units nightly at home.     Previous admissions: 09/26/2018    A1c:   Lab Results   Component Value Date/Time    Hemoglobin A1c 9.5 (H) 07/16/2019 07:54 PM    Hemoglobin A1c 10.2 (H) 09/27/2018 04:11 AM       Recent Glucose Results:   Lab Results   Component Value Date/Time     (H) 07/17/2019 05:25 AM    GLUCPOC 311 (H) 07/17/2019 07:14 AM    GLUCPOC 138 (H) 07/16/2019 10:42 PM    GLUCPOC 74 07/16/2019 10:17 PM        Lab Results   Component Value Date/Time    Creatinine 0.62 07/17/2019 05:25 AM       Active Orders   Diet    DIET CARDIAC Regular; Consistent Carb 1800kcal          Assessed and instructed patient on the following:   ·  interpretation of lab results: reviewed A1c with eAG, anemia consideration, and A1c goal  · blood sugar goals: preprandial and postprandial targets  · hypoglycemia prevention and treatment: 15:15 rule  · exercise  · SMBG skills: using Freestyle Corinna, compliant with monitoring  · Nutrition: see above  · referred to Diabetes Educator--declined need at this time; informed patient Dr. Chrissy Raymond can refer to DTC as needed for lifestyle management assistance    Provided patient with the following: [x]          Diabetes Self-Care Guide               []          Insulin education materials               []          Diabetes survival skills handout               []          BG guidelines for post-op patients               []          \"Decreasing  the Cost of Diabetes Care\"               [x]          Outpatient DTC contact number          Patient to follow up with Endocrinology after discharge. Will continue to follow as needed. Thank you.          Time spent: 20 min

## 2019-07-18 LAB
ABO + RH BLD: NORMAL
BLD PROD TYP BPU: NORMAL
BLOOD GROUP ANTIBODIES SERPL: NORMAL
BPU ID: NORMAL
CROSSMATCH RESULT,%XM: NORMAL
DEPRECATED HGB OTHER BLD-IMP: 0 %
HGB A MFR BLD: 98.6 % (ref 96.4–98.8)
HGB A2 MFR BLD COLUMN CHROM: 1.4 % (ref 1.8–3.2)
HGB C MFR BLD: 0 %
HGB F MFR BLD: 0 % (ref 0–2)
HGB FRACT BLD-IMP: ABNORMAL
HGB S BLD QL SOLY: NEGATIVE
HGB S MFR BLD: 0 %
SPECIMEN EXP DATE BLD: NORMAL
STATUS OF UNIT,%ST: NORMAL
UNIT DIVISION, %UDIV: 0

## 2019-09-10 ENCOUNTER — HOSPITAL ENCOUNTER (EMERGENCY)
Age: 44
Discharge: HOME | End: 2019-09-10
Attending: EMERGENCY MEDICINE
Payer: COMMERCIAL

## 2019-09-10 VITALS
BODY MASS INDEX: 20.91 KG/M2 | WEIGHT: 118 LBS | HEART RATE: 94 BPM | OXYGEN SATURATION: 99 % | HEIGHT: 63 IN | DIASTOLIC BLOOD PRESSURE: 82 MMHG | TEMPERATURE: 97.9 F | RESPIRATION RATE: 16 BRPM | SYSTOLIC BLOOD PRESSURE: 139 MMHG

## 2019-09-10 DIAGNOSIS — M79.602 PAIN OF LEFT UPPER EXTREMITY: Primary | ICD-10-CM

## 2019-09-10 LAB
ALBUMIN SERPL-MCNC: 3.7 G/DL (ref 3.5–5)
ALBUMIN/GLOB SERPL: 0.9 {RATIO} (ref 1.1–2.2)
ALP SERPL-CCNC: 91 U/L (ref 45–117)
ALT SERPL-CCNC: 19 U/L (ref 12–78)
ANION GAP SERPL CALC-SCNC: 6 MMOL/L (ref 5–15)
AST SERPL-CCNC: 19 U/L (ref 15–37)
BASOPHILS # BLD: 0 K/UL (ref 0–0.1)
BASOPHILS NFR BLD: 0 % (ref 0–1)
BILIRUB SERPL-MCNC: 0.1 MG/DL (ref 0.2–1)
BUN SERPL-MCNC: 8 MG/DL (ref 6–20)
BUN/CREAT SERPL: 15 (ref 12–20)
CALCIUM SERPL-MCNC: 8.9 MG/DL (ref 8.5–10.1)
CHLORIDE SERPL-SCNC: 107 MMOL/L (ref 97–108)
CO2 SERPL-SCNC: 28 MMOL/L (ref 21–32)
CREAT SERPL-MCNC: 0.55 MG/DL (ref 0.55–1.02)
DIFFERENTIAL METHOD BLD: ABNORMAL
EOSINOPHIL # BLD: 0.1 K/UL (ref 0–0.4)
EOSINOPHIL NFR BLD: 2 % (ref 0–7)
ERYTHROCYTE [DISTWIDTH] IN BLOOD BY AUTOMATED COUNT: 25.1 % (ref 11.5–14.5)
GLOBULIN SER CALC-MCNC: 4 G/DL (ref 2–4)
GLUCOSE SERPL-MCNC: 86 MG/DL (ref 65–100)
HCT VFR BLD AUTO: 34.4 % (ref 35–47)
HGB BLD-MCNC: 10 G/DL (ref 11.5–16)
IMM GRANULOCYTES # BLD AUTO: 0 K/UL (ref 0–0.04)
IMM GRANULOCYTES NFR BLD AUTO: 0 % (ref 0–0.5)
LYMPHOCYTES # BLD: 1.3 K/UL (ref 0.8–3.5)
LYMPHOCYTES NFR BLD: 19 % (ref 12–49)
MCH RBC QN AUTO: 25.3 PG (ref 26–34)
MCHC RBC AUTO-ENTMCNC: 29.1 G/DL (ref 30–36.5)
MCV RBC AUTO: 86.9 FL (ref 80–99)
MONOCYTES # BLD: 0.5 K/UL (ref 0–1)
MONOCYTES NFR BLD: 7 % (ref 5–13)
NEUTS SEG # BLD: 5 K/UL (ref 1.8–8)
NEUTS SEG NFR BLD: 72 % (ref 32–75)
NRBC # BLD: 0 K/UL (ref 0–0.01)
NRBC BLD-RTO: 0 PER 100 WBC
PLATELET # BLD AUTO: 366 K/UL (ref 150–400)
PMV BLD AUTO: 9.5 FL (ref 8.9–12.9)
POTASSIUM SERPL-SCNC: 3.5 MMOL/L (ref 3.5–5.1)
PROT SERPL-MCNC: 7.7 G/DL (ref 6.4–8.2)
RBC # BLD AUTO: 3.96 M/UL (ref 3.8–5.2)
RBC MORPH BLD: ABNORMAL
SODIUM SERPL-SCNC: 141 MMOL/L (ref 136–145)
TROPONIN I SERPL-MCNC: <0.05 NG/ML
WBC # BLD AUTO: 6.9 K/UL (ref 3.6–11)

## 2019-09-10 PROCEDURE — 99282 EMERGENCY DEPT VISIT SF MDM: CPT

## 2019-09-10 PROCEDURE — 80053 COMPREHEN METABOLIC PANEL: CPT

## 2019-09-10 PROCEDURE — 93005 ELECTROCARDIOGRAM TRACING: CPT

## 2019-09-10 PROCEDURE — 85025 COMPLETE CBC W/AUTO DIFF WBC: CPT

## 2019-09-10 PROCEDURE — 84484 ASSAY OF TROPONIN QUANT: CPT

## 2019-09-10 PROCEDURE — 36415 COLL VENOUS BLD VENIPUNCTURE: CPT

## 2019-09-11 LAB
ATRIAL RATE: 80 BPM
CALCULATED P AXIS, ECG09: 26 DEGREES
CALCULATED R AXIS, ECG10: 11 DEGREES
CALCULATED T AXIS, ECG11: -8 DEGREES
DIAGNOSIS, 93000: NORMAL
P-R INTERVAL, ECG05: 146 MS
Q-T INTERVAL, ECG07: 366 MS
QRS DURATION, ECG06: 70 MS
QTC CALCULATION (BEZET), ECG08: 422 MS
VENTRICULAR RATE, ECG03: 80 BPM

## 2019-09-11 NOTE — ED PROVIDER NOTES
Leandra Rivero is a 40 y.o. female  who presents by private vehicle to ER with c/o Patient presents with:  Arm Pain  Patient presents with complaints of left arm from forearm up to shoulder, ongoing x 1 week. Patient states that left hand with occasionally tingle. Denies chest pain or shortness of breath however reports family history of MI at young age. Patient denies any injury to area. Denies neck pain. Reports mild upper back pain. She specifically denies any fevers, chills, nausea, vomiting, chest pain, shortness of breath, headache, rash, diarrhea, abdominal pain, urinary/bowel changes, sweating or weight loss. PCP: Emilee Finn MD   PMHx significant for: Past Medical History:  No date: Anemia  No date: Anemia  No date: Diabetes [facility])      Comment:  type 1  No date: Dysmenorrhea  No date: Endometriosis  No date: GERD (gastroesophageal reflux disease)  No date: Mitral valve prolapse  3/2013 and 8/2013: Spontaneous pneumothorax      Comment:  recurrent right--total of 4 episodes, 2 required                surgeries, above dates   PSHx significant for: Past Surgical History:  09/20/2018: [address]  2009: HX OTHER SURGICAL      Comment:  ex lap, abdominal for endometriosis  3/21/2013: HX OTHER SURGICAL      Comment:  Bronch, RVATS, apical bleb rsxn,parietal pleurectomy  8/21/2013: HX OTHER SURGICAL      Comment:  Bronchoscopy, RVATS and talc pleurodesis  Social Hx: Tobacco use: Social History    Tobacco Use      Smoking status: Never Smoker      Smokeless tobacco: Never Used  ; EtOH use: The patient states she drinks 0 per week.; Illicit Drug use: Allergies:  No Known Allergies    There are no other complaints, changes or physical findings at this time.               Past Medical History:   Diagnosis Date    Anemia     Anemia     Diabetes ([address]     type 1    Dysmenorrhea     Endometriosis     GERD (gastroesophageal reflux disease)     Mitral valve prolapse     Spontaneous pneumothorax 3/2013 and 8/2013    recurrent right--total of 4 episodes, 2 required surgeries, above dates       Past Surgical History:   Procedure Laterality Date    HX DILATION AND CURETTAGE  09/20/2018    HX OTHER SURGICAL  2009    ex lap, abdominal for endometriosis    HX OTHER SURGICAL  3/21/2013    Bronch, RVATS, apical bleb rsxn,parietal pleurectomy    HX OTHER SURGICAL  8/21/2013    Bronchoscopy, RVATS and talc pleurodesis         Family History:   Problem Relation Age of Onset    Hypertension Mother     Diabetes Father     Heart Disease Father     Anemia Father        Social History     Socioeconomic History    Marital status:      Spouse name: Not on file    Number of children: Not on file    Years of education: Not on file    Highest education level: Not on file   Occupational History    Not on file   Social Needs    Financial resource strain: Not on file    Food insecurity:     Worry: Not on file     Inability: Not on file    Transportation needs:     Medical: Not on file     Non-medical: Not on file   Tobacco Use    Smoking status: Never Smoker    Smokeless tobacco: Never Used   Substance and Sexual Activity    Alcohol use: Yes     Comment: rarely    Drug use: No    Sexual activity: Not on file   Lifestyle    Physical activity:     Days per week: Not on file     Minutes per session: Not on file    Stress: Not on file   Relationships    Social connections:     Talks on phone: Not on file     Gets together: Not on file     Attends Faith service: Not on file     Active member of club or organization: Not on file     Attends meetings of clubs or organizations: Not on file     Relationship status: Not on file    Intimate partner violence:     Fear of current or ex partner: Not on file     Emotionally abused: Not on file     Physically abused: Not on file     Forced sexual activity: Not on file   Other Topics Concern    Not on file   Social History Narrative    Not on file         ALLERGIES: Patient has no known allergies. Review of Systems   Constitutional: Negative for activity change, chills and fever. HENT: Negative for congestion, rhinorrhea and sore throat. Respiratory: Negative for shortness of breath. Cardiovascular: Negative for chest pain and leg swelling. Gastrointestinal: Negative for abdominal pain, diarrhea, nausea and vomiting. Genitourinary: Negative for dysuria, vaginal bleeding and vaginal discharge. Musculoskeletal: Positive for myalgias. Negative for arthralgias. Neurological: Negative for dizziness. Psychiatric/Behavioral: Negative for confusion. All other systems reviewed and are negative. Vitals:    09/10/19 2101   BP: 139/82   Pulse: 94   Resp: 16   Temp: 97.9 °F (36.6 °C)   SpO2: 99%   Weight: 53.5 kg (118 lb)   Height: 5' 3\" (1.6 m)            Physical Exam   Constitutional: She is oriented to person, place, and time. She appears well-developed. HENT:   Head: Normocephalic and atraumatic. Right Ear: External ear normal.   Left Ear: External ear normal.   Nose: Nose normal.   Mouth/Throat: Oropharynx is clear and moist. No oropharyngeal exudate. Eyes: Conjunctivae, EOM and lids are normal. Right eye exhibits no discharge. Left eye exhibits no discharge. Neck: Normal range of motion. No tracheal deviation present. No thyromegaly present. Cardiovascular: Normal rate, regular rhythm, normal heart sounds and intact distal pulses. Pulmonary/Chest: Effort normal and breath sounds normal.   Abdominal: Soft. Normal appearance and bowel sounds are normal.   Musculoskeletal: Normal range of motion. Arms:  Spine palpated with out step off or crepitus. Non-TTP over spine. Full ROM to all extremities. All extremities are NVI. Patient ambulatory to exam room with out difficulty. Neurological: She is alert and oriented to person, place, and time. Skin: Skin is warm and dry.    Psychiatric: She has a normal mood and affect. Judgment normal.        MDM  Number of Diagnoses or Management Options  Pain of left upper extremity:   Diagnosis management comments: Assesment/Plan- 40 y.o. Patient presents with:  Arm Pain  differential includes: unstable angina, muscle strain, cervical radiculopathy. Labs and imaging reviewed with no acute findings. Patient is well appearing, afebrile and tolerating PO. Recommend PCP follow up. Patient educated on reasons to return to the ED. Procedures    I was personally available for consultation in the emergency department. I have reviewed the chart and agree with the documentation recorded by the [facility], including the assessment, treatment plan, and disposition.   Sunny Jerez MD

## 2019-09-11 NOTE — ED NOTES
Patient given discharge instructions per provider, patient verbalized understanding. Patient ambulatory from ED to home with spouse.

## 2019-09-11 NOTE — ED TRIAGE NOTES
Pt ambulatory to [facility] c/o pain in L arm from forearm up to shoulder, ongoing x1 week. States that L hand has on and off \"tingling. \" Denies CP, SOB, n/v/d.   Denies falls/injury to L arm

## 2019-12-24 ENCOUNTER — HOSPITAL ENCOUNTER (EMERGENCY)
Age: 44
Discharge: HOME | End: 2019-12-24
Attending: EMERGENCY MEDICINE
Payer: COMMERCIAL

## 2019-12-24 ENCOUNTER — APPOINTMENT (OUTPATIENT)
Dept: CT IMAGING | Age: 44
End: 2019-12-24
Attending: EMERGENCY MEDICINE
Payer: COMMERCIAL

## 2019-12-24 ENCOUNTER — APPOINTMENT (OUTPATIENT)
Dept: GENERAL RADIOLOGY | Age: 44
End: 2019-12-24
Attending: EMERGENCY MEDICINE
Payer: COMMERCIAL

## 2019-12-24 VITALS
WEIGHT: 118 LBS | DIASTOLIC BLOOD PRESSURE: 83 MMHG | RESPIRATION RATE: 17 BRPM | HEART RATE: 71 BPM | TEMPERATURE: 98.6 F | SYSTOLIC BLOOD PRESSURE: 123 MMHG | HEIGHT: 62 IN | OXYGEN SATURATION: 100 % | BODY MASS INDEX: 21.71 KG/M2

## 2019-12-24 DIAGNOSIS — E87.6 HYPOKALEMIA: ICD-10-CM

## 2019-12-24 DIAGNOSIS — J90 CHRONIC PLEURAL EFFUSION: ICD-10-CM

## 2019-12-24 DIAGNOSIS — D50.9 MICROCYTIC ANEMIA: Primary | ICD-10-CM

## 2019-12-24 LAB
ALBUMIN SERPL-MCNC: 3.6 G/DL (ref 3.5–5)
ALBUMIN/GLOB SERPL: 0.9 {RATIO} (ref 1.1–2.2)
ALP SERPL-CCNC: 89 U/L (ref 45–117)
ALT SERPL-CCNC: 30 U/L (ref 12–78)
ANION GAP SERPL CALC-SCNC: 4 MMOL/L (ref 5–15)
APTT PPP: 23.1 SEC (ref 22.1–32)
AST SERPL-CCNC: 25 U/L (ref 15–37)
ATRIAL RATE: 72 BPM
BASOPHILS # BLD: 0.1 K/UL (ref 0–0.1)
BASOPHILS NFR BLD: 1 % (ref 0–1)
BILIRUB SERPL-MCNC: 0.2 MG/DL (ref 0.2–1)
BUN SERPL-MCNC: 10 MG/DL (ref 6–20)
BUN/CREAT SERPL: 20 (ref 12–20)
CALCIUM SERPL-MCNC: 8.3 MG/DL (ref 8.5–10.1)
CALCULATED P AXIS, ECG09: 45 DEGREES
CALCULATED R AXIS, ECG10: 26 DEGREES
CALCULATED T AXIS, ECG11: 20 DEGREES
CHLORIDE SERPL-SCNC: 108 MMOL/L (ref 97–108)
CO2 SERPL-SCNC: 27 MMOL/L (ref 21–32)
COMMENT, HOLDF: NORMAL
CREAT SERPL-MCNC: 0.5 MG/DL (ref 0.55–1.02)
D DIMER PPP FEU-MCNC: 1.5 MG/L FEU (ref 0–0.65)
DIAGNOSIS, 93000: NORMAL
DIFFERENTIAL METHOD BLD: ABNORMAL
EOSINOPHIL # BLD: 0.1 K/UL (ref 0–0.4)
EOSINOPHIL NFR BLD: 1 % (ref 0–7)
ERYTHROCYTE [DISTWIDTH] IN BLOOD BY AUTOMATED COUNT: 23 % (ref 11.5–14.5)
GLOBULIN SER CALC-MCNC: 3.8 G/DL (ref 2–4)
GLUCOSE SERPL-MCNC: 96 MG/DL (ref 65–100)
HCT VFR BLD AUTO: 36.3 % (ref 35–47)
HGB BLD-MCNC: 10.7 G/DL (ref 11.5–16)
IMM GRANULOCYTES # BLD AUTO: 0 K/UL (ref 0–0.04)
IMM GRANULOCYTES NFR BLD AUTO: 0 % (ref 0–0.5)
INR PPP: 1 (ref 0.9–1.1)
LYMPHOCYTES # BLD: 0.8 K/UL (ref 0.8–3.5)
LYMPHOCYTES NFR BLD: 12 % (ref 12–49)
MCH RBC QN AUTO: 25.2 PG (ref 26–34)
MCHC RBC AUTO-ENTMCNC: 29.5 G/DL (ref 30–36.5)
MCV RBC AUTO: 85.4 FL (ref 80–99)
MONOCYTES # BLD: 0.4 K/UL (ref 0–1)
MONOCYTES NFR BLD: 7 % (ref 5–13)
NEUTS SEG # BLD: 4.9 K/UL (ref 1.8–8)
NEUTS SEG NFR BLD: 79 % (ref 32–75)
NRBC # BLD: 0 K/UL (ref 0–0.01)
NRBC BLD-RTO: 0 PER 100 WBC
P-R INTERVAL, ECG05: 132 MS
PLATELET # BLD AUTO: 349 K/UL (ref 150–400)
PMV BLD AUTO: 10.4 FL (ref 8.9–12.9)
POTASSIUM SERPL-SCNC: 3.2 MMOL/L (ref 3.5–5.1)
PROT SERPL-MCNC: 7.4 G/DL (ref 6.4–8.2)
PROTHROMBIN TIME: 10.5 SEC (ref 9–11.1)
Q-T INTERVAL, ECG07: 380 MS
QRS DURATION, ECG06: 66 MS
QTC CALCULATION (BEZET), ECG08: 416 MS
RBC # BLD AUTO: 4.25 M/UL (ref 3.8–5.2)
RBC MORPH BLD: ABNORMAL
SAMPLES BEING HELD,HOLD: NORMAL
SODIUM SERPL-SCNC: 139 MMOL/L (ref 136–145)
THERAPEUTIC RANGE,PTTT: NORMAL SECS (ref 58–77)
TROPONIN I SERPL-MCNC: <0.05 NG/ML
VENTRICULAR RATE, ECG03: 72 BPM
WBC # BLD AUTO: 6.3 K/UL (ref 3.6–11)

## 2019-12-24 PROCEDURE — 85025 COMPLETE CBC W/AUTO DIFF WBC: CPT

## 2019-12-24 PROCEDURE — 71046 X-RAY EXAM CHEST 2 VIEWS: CPT

## 2019-12-24 PROCEDURE — 36415 COLL VENOUS BLD VENIPUNCTURE: CPT

## 2019-12-24 PROCEDURE — 80053 COMPREHEN METABOLIC PANEL: CPT

## 2019-12-24 PROCEDURE — 93005 ELECTROCARDIOGRAM TRACING: CPT

## 2019-12-24 PROCEDURE — 84484 ASSAY OF TROPONIN QUANT: CPT

## 2019-12-24 PROCEDURE — 71275 CT ANGIOGRAPHY CHEST: CPT

## 2019-12-24 PROCEDURE — 74011636320 HC RX REV CODE- 636/320

## 2019-12-24 PROCEDURE — 85379 FIBRIN DEGRADATION QUANT: CPT

## 2019-12-24 PROCEDURE — 85730 THROMBOPLASTIN TIME PARTIAL: CPT

## 2019-12-24 PROCEDURE — 99284 EMERGENCY DEPT VISIT MOD MDM: CPT

## 2019-12-24 PROCEDURE — 85610 PROTHROMBIN TIME: CPT

## 2019-12-24 RX ORDER — POTASSIUM CHLORIDE 1500 MG/1
20 TABLET, FILM COATED, EXTENDED RELEASE ORAL 2 TIMES DAILY
Qty: 20 TAB | Refills: 0 | Status: ON HOLD | OUTPATIENT
Start: 2019-12-24 | End: 2021-01-06

## 2019-12-24 RX ADMIN — IOPAMIDOL 80 ML: 755 INJECTION, SOLUTION INTRAVENOUS at 07:36

## 2019-12-24 NOTE — ED TRIAGE NOTES
Ambulated to room 3 without difficulties  Pt c/o SOB for several days, denies CP, (+)productive cough, clear sputum, (-)fever (-)chills (-)swelling in extremities, felt like heart was racing earlier \"but not as much\" now,   Recent visit to [facility], had EKG performed there for pre-op, scheduled for hysterectomy  Hx spontaneous pneumothorax x4 in 2013

## 2019-12-24 NOTE — ED PROVIDER NOTES
The patient is a 42-year-old female with a past medical history significant for type 1 diabetes, dysmenorrhea, endometriosis, iron deficiency anemia, menometrorrhagia, status post D&C, pneumothorax, VATS who presents to the ED with a complaint of intermittent episodes of shortness of breath off and on for a week. The patient state that she was scheduled to have the procedure last week and an EKG was performed, her heart rate was very fast.  She came to the ER to be evaluated while she began experiencing increasing shortness of breath on exertion with dry cough or congestion. She denies any fever, headache, sore throat, neck and back pain, nausea, vomiting, abdominal pain, diarrhea, constipation, dysuria, dizziness, extremity weakness and numbness, sick contact at home, skin rash.            Past Medical History:   Diagnosis Date    Anemia     Anemia     Diabetes ([address]     type 1    Dysmenorrhea     Endometriosis     GERD (gastroesophageal reflux disease)     Mitral valve prolapse     Spontaneous pneumothorax 3/2013 and 8/2013    recurrent right--total of 4 episodes, 2 required surgeries, above dates       Past Surgical History:   Procedure Laterality Date    HX DILATION AND CURETTAGE  09/20/2018    HX OTHER SURGICAL  2009    ex lap, abdominal for endometriosis    HX OTHER SURGICAL  3/21/2013    Bronch, RVATS, apical bleb rsxn,parietal pleurectomy    HX OTHER SURGICAL  8/21/2013    Bronchoscopy, RVATS and talc pleurodesis         Family History:   Problem Relation Age of Onset    Hypertension Mother     Diabetes Father     Heart Disease Father     Anemia Father        Social History     Socioeconomic History    Marital status:      Spouse name: Not on file    Number of children: Not on file    Years of education: Not on file    Highest education level: Not on file   Occupational History    Not on file   Social Needs    Financial resource strain: Not on file    Food insecurity: Worry: Not on file     Inability: Not on file    Transportation needs:     Medical: Not on file     Non-medical: Not on file   Tobacco Use    Smoking status: Never Smoker    Smokeless tobacco: Never Used   Substance and Sexual Activity    Alcohol use: Yes     Comment: rarely    Drug use: No    Sexual activity: Not on file   Lifestyle    Physical activity:     Days per week: Not on file     Minutes per session: Not on file    Stress: Not on file   Relationships    Social connections:     Talks on phone: Not on file     Gets together: Not on file     Attends Faith service: Not on file     Active member of club or organization: Not on file     Attends meetings of clubs or organizations: Not on file     Relationship status: Not on file    Intimate partner violence:     Fear of current or ex partner: Not on file     Emotionally abused: Not on file     Physically abused: Not on file     Forced sexual activity: Not on file   Other Topics Concern    Not on file   Social History Narrative    Not on file         ALLERGIES: Patient has no known allergies. Review of Systems   All other systems reviewed and are negative. Vitals:    12/24/19 0630   BP: 123/83   Pulse: 81   Resp: 16   Temp: 98.6 °F (37 °C)   SpO2: 100%   Weight: 53.5 kg (118 lb)   Height: 5' 2\" (1.575 m)            Physical Exam  Vitals signs and nursing note reviewed. CONSTITUTIONAL: Well-appearing; well-nourished; in no apparent distress  HEAD: Normocephalic; atraumatic  EYES: PERRL; EOM intact; conjunctiva and sclera are clear bilaterally. ENT: No rhinorrhea; normal pharynx with no tonsillar hypertrophy; mucous membranes pink/moist, no erythema, no exudate. NECK: Supple; non-tender; no cervical lymphadenopathy  CARD: Normal S1, S2; no murmurs, rubs, or gallops. Regular rate and rhythm. RESP: Normal respiratory effort; breath sounds clear and equal bilaterally; no wheezes, rhonchi, or rales.   ABD: Normal bowel sounds; non-distended; non-tender; no palpable organomegaly, no masses, no bruits. Back Exam: Normal inspection; no vertebral point tenderness, no CVA tenderness. Normal range of motion. EXT: Normal ROM in all four extremities; non-tender to palpation; no swelling or deformity; distal pulses are normal, no edema. SKIN: Warm; dry; no rash. NEURO:Alert and oriented x 3, coherent, ZAKIA-XII grossly intact, sensory and motor are non-focal.      MDM  Number of Diagnoses or Management Options  Diagnosis management comments: Assessment: 51-year-old female who presented with subjective feeling of shortness of breath rule out pneumonia/pneumothorax/VTE/ACS/anemia. The patient appears hemodynamically stable. She has a nonfocal exam with stable vital signs. Plan: EKG/chest x-ray/lab/IV fluid/serial exam/ Monitor and Reevaluate. Amount and/or Complexity of Data Reviewed  Clinical lab tests: ordered and reviewed  Tests in the radiology section of CPT®: ordered and reviewed  Tests in the medicine section of CPT®: reviewed and ordered  Discussion of test results with the performing providers: yes  Decide to obtain previous medical records or to obtain history from someone other than the patient: yes  Obtain history from someone other than the patient: yes  Review and summarize past medical records: yes  Discuss the patient with other providers: yes  Independent visualization of images, tracings, or specimens: yes    Risk of Complications, Morbidity, and/or Mortality  Presenting problems: moderate  Diagnostic procedures: moderate  Management options: moderate           Procedures    ED EKG interpretation:  Rhythm: normal sinus rhythm; and regular . Rate (approx.): 72; Axis: normal; P wave: normal; QRS interval: normal ; ST/T wave: normal; in  Lead: Diffusely; Other findings: borderline ekg. This EKG was interpreted by Barbie Minor MD,ED Provider.     XRAY INTERPRETATION (ED MD)  Chest Xray  Chronic right-sided decisional disease with pleurodesis; no focal consolidation or pneumonia  Tariq Garrido MD 6:41 AM    CONSULT NOTE:  Tariq Garrido MD spoke with Dr. Genoveva Jones of Thoracic Surgery Discussed patient's presentation, history, physical assessment, and available diagnostic results. He states that findings are consistent with patient's previous surgery of VATS and pleurodesis. There is nothing to do at this time. wants patient discharged home and will follow up in the office for outpatient reevaluation and work-up as deemed appropriate. Progress Note:   Pt has been reexamined by Denita Castillo MD. Pt is feeling much better. Symptoms have improved. All available results have been reviewed with pt and any available family. Pt understands sx, dx, and tx in ED. Care plan has been outlined and questions have been answered. Pt is ready to go home. Will send home on chronic anemia, chronic pleural effusion and dehydration instruction. . Outpatient referral with PCP as needed. Written by Denita Castillo MD,8:40 AM    .   .

## 2019-12-24 NOTE — DISCHARGE INSTRUCTIONS
Patient Education        Iron Deficiency Anemia: Care Instructions  Your Care Instructions    Anemia means that you do not have enough red blood cells. Red blood cells carry oxygen around your body. When you have anemia, it can make you pale, weak, and tired. Many things can cause anemia. The most common cause is loss of blood. This can happen if you have heavy menstrual periods. It can also happen if you have bleeding in your stomach or bowel. You can also get anemia if you don't have enough iron in your diet or if it's hard for your body to absorb iron. In some cases, pregnancy causes anemia. That's because a pregnant woman needs more iron. Your doctor may do more tests to find the cause of your anemia. If a disease or other health problem is causing it, your doctor will treat that problem. It's important to follow up with your doctor to make sure that your iron level returns to normal.  Follow-up care is a key part of your treatment and safety. Be sure to make and go to all appointments, and call your doctor if you are having problems. It's also a good idea to know your test results and keep a list of the medicines you take. How can you care for yourself at home? · If your doctor recommended iron pills, take them as directed. ? Try to take the pills on an empty stomach. You can do this about 1 hour before or 2 hours after meals. But you may need to take iron with food to avoid an upset stomach. ? Do not take antacids or drink milk or anything with caffeine within 2 hours of when you take your iron. They can keep your body from absorbing the iron well. ? Vitamin C helps your body absorb iron. You may want to take iron pills with a glass of orange juice or some other food high in vitamin C.  ? Iron pills may cause stomach problems. These include heartburn, nausea, diarrhea, constipation, and cramps. It can help to drink plenty of fluids and include fruits, vegetables, and fiber in your diet.   ? It's normal for iron pills to make your stool a greenish or grayish black. But internal bleeding can also cause dark stool. So it's important to tell your doctor about any color changes. ? Call your doctor if you think you are having a problem with your iron pills. Even after you start to feel better, it will take several months for your body to build up its supply of iron. ? If you miss a pill, don't take a double dose. ? Keep iron pills out of the reach of small children. Too much iron can be very dangerous. · Eat foods with a lot of iron. These include red meat, shellfish, poultry, and eggs. They also include beans, raisins, whole-grain bread, and leafy green vegetables. · Steam your vegetables. This is the best way to prepare them if you want to get as much iron as possible. · Be safe with medicines. Do not take nonsteroidal anti-inflammatory pain relievers unless your doctor tells you to. These include aspirin, naproxen (Aleve), and ibuprofen (Advil, Motrin). · Liquid iron can stain your teeth. But you can mix it with water or juice and drink it with a straw. Then it won't get on your teeth. When should you call for help? Call 911 anytime you think you may need emergency care. For example, call if:    · You passed out (lost consciousness).    Call your doctor now or seek immediate medical care if:    · You are short of breath.     · You are dizzy or light-headed, or you feel like you may faint.     · You have new or worse bleeding.    Watch closely for changes in your health, and be sure to contact your doctor if:    · You feel weaker or more tired than usual.     · You do not get better as expected. Where can you learn more? Go to http://justyna-octavio.info/. Enter T550 in the search box to learn more about \"Iron Deficiency Anemia: Care Instructions. \"  Current as of: March 28, 2019  Content Version: 12.2  © 0531-5056 [employer].  Care instructions adapted under license by [facility] (which disclaims liability or warranty for this information). If you have questions about a medical condition or this instruction, always ask your healthcare professional. [address] any warranty or liability for your use of this information. Patient Education        Hypokalemia: Care Instructions  Your Care Instructions    Hypokalemia (say \"xg-zv-kov-SHOSHANA-jeanine-uh\") is a low level of potassium. The heart, muscles, kidneys, and nervous system all need potassium to work well. This problem has many different causes. Kidney problems, diet, and medicines like diuretics and laxatives can cause it. So can vomiting or diarrhea. In some cases, cancer is the cause. Your doctor may do tests to find the cause of your low potassium levels. You may need medicines to bring your potassium levels back to normal. You may also need regular blood tests to check your potassium. If you have very low potassium, you may need intravenous (IV) medicines. You also may need tests to check the electrical activity of your heart. Heart problems caused by low potassium levels can be very serious. Follow-up care is a key part of your treatment and safety. Be sure to make and go to all appointments, and call your doctor if you are having problems. It's also a good idea to know your test results and keep a list of the medicines you take. How can you care for yourself at home? · If your doctor recommends it, eat foods that have a lot of potassium. These include fresh fruits, juices, and vegetables. They also include nuts, beans, and milk. · Be safe with medicines. If your doctor prescribes medicines or potassium supplements, take them exactly as directed. Call your doctor if you have any problems with your medicines. · Get your potassium levels tested as often as your doctor tells you. When should you call for help? Call 911 anytime you think you may need emergency care.  For example, call if:    · You feel like your heart is missing beats. Heart problems caused by low potassium can cause death.     · You passed out (lost consciousness).     · You have a seizure.    Call your doctor now or seek immediate medical care if:    · You feel weak or unusually tired.     · You have severe arm or leg cramps.     · You have tingling or numbness.     · You feel sick to your stomach, or you vomit.     · You have belly cramps.     · You feel bloated or constipated.     · You have to urinate a lot.     · You feel very thirsty most of the time.     · You are dizzy or lightheaded, or you feel like you may faint.     · You feel depressed, or you lose touch with reality.    Watch closely for changes in your health, and be sure to contact your doctor if:    · You do not get better as expected. Where can you learn more? Go to http://justyna-octavio.info/. Enter G358 in the search box to learn more about \"Hypokalemia: Care Instructions. \"  Current as of: November 6, 2018  Content Version: 12.2  © 7337-4033 [employer]. Care instructions adapted under license by [employer] (which disclaims liability or warranty for this information). If you have questions about a medical condition or this instruction, always ask your healthcare professional. [address] any warranty or liability for your use of this information. Patient Education        Learning About a Pleural Effusion  What is it? A pleural effusion (say \"PLER-silas qf-LNME-wqrj\") is the buildup of fluid in the space between tissues lining the lungs and the chest wall. Because of the fluid buildup, the lungs may not be able to expand completely. This can make it hard to breathe. A pleural empyema (say \"ac-mt-AE-muh\") is a problem that can happen with pleural effusion. Bacteria or other infections cause pus to form in the pleural fluid. But most pleural effusions don't become infected. What causes it?   A pleural effusion has many causes. They include pneumonia, cancer, inflammation of the tissues around the lungs, and heart failure. What are the symptoms? Symptoms of a pleural effusion may include:  · Trouble breathing. · Shortness of breath. · Chest pain. · Fever. · A cough. A minor pleural effusion may not cause any symptoms. How is it diagnosed? A pleural effusion is usually diagnosed with an X-ray and a physical exam. The doctor listens to the airflow in your lungs. How is it treated? A pleural effusion can be treated by removing fluid from the space between the tissues around the lungs. This is done with a needle that's put into the chest (thoracentesis). A small amount of the fluid may be sent to a lab to find out what is causing the buildup of fluid. Removing the fluid may help to relieve symptoms, such as shortness of breath and chest pain. It can help the lungs to expand more fully. If the pleural effusion doesn't get better, a catheter may be placed in the chest. This is a flexible tube that allows fluid to drain from the lungs. The catheter stays in the chest until the doctor removes it. Some people may get a treatment that removes the fluid and then puts a medicine into the chest cavity. This helps to prevent too much fluid from building up again. A minor pleural effusion often goes away on its own. Doctors may need to treat the condition that is causing the pleural effusion. For example, you may get medicines to treat pneumonia or congestive heart failure. When the condition is treated, the effusion usually goes away. For a pleural empyema, the pus needs to be drained. It may drain from a flexible tube placed in the chest. Or you may have surgery to drain it. You also will get antibiotics. Follow-up care is a key part of your treatment and safety. Be sure to make and go to all appointments, and call your doctor if you are having problems.  It's also a good idea to know your test results and keep a list of the medicines you take. Where can you learn more? Go to http://justyna-octavio.info/. Enter A920 in the search box to learn more about \"Learning About a Pleural Effusion. \"  Current as of: June 9, 2019  Content Version: 12.2  © 4810-8844 [employer]. Care instructions adapted under license by [facility] (which disclaims liability or warranty for this information). If you have questions about a medical condition or this instruction, always ask your healthcare professional. [address] any warranty or liability for your use of this information.

## 2020-07-05 ENCOUNTER — APPOINTMENT (OUTPATIENT)
Dept: GENERAL RADIOLOGY | Age: 45
End: 2020-07-05
Attending: EMERGENCY MEDICINE
Payer: COMMERCIAL

## 2020-07-05 ENCOUNTER — HOSPITAL ENCOUNTER (EMERGENCY)
Age: 45
Discharge: HOME | End: 2020-07-06
Attending: EMERGENCY MEDICINE | Admitting: EMERGENCY MEDICINE
Payer: COMMERCIAL

## 2020-07-05 DIAGNOSIS — R06.02 SOB (SHORTNESS OF BREATH): Primary | ICD-10-CM

## 2020-07-05 LAB
BASOPHILS # BLD: 0.1 K/UL (ref 0–0.1)
BASOPHILS NFR BLD: 1 % (ref 0–1)
COMMENT, HOLDF: NORMAL
DIFFERENTIAL METHOD BLD: ABNORMAL
EOSINOPHIL # BLD: 0.2 K/UL (ref 0–0.4)
EOSINOPHIL NFR BLD: 3 % (ref 0–7)
ERYTHROCYTE [DISTWIDTH] IN BLOOD BY AUTOMATED COUNT: 12.5 % (ref 11.5–14.5)
HCT VFR BLD AUTO: 41.2 % (ref 35–47)
HGB BLD-MCNC: 12.8 G/DL (ref 11.5–16)
IMM GRANULOCYTES # BLD AUTO: 0.1 K/UL (ref 0–0.04)
IMM GRANULOCYTES NFR BLD AUTO: 1 % (ref 0–0.5)
LYMPHOCYTES # BLD: 1.4 K/UL (ref 0.8–3.5)
LYMPHOCYTES NFR BLD: 21 % (ref 12–49)
MCH RBC QN AUTO: 27.4 PG (ref 26–34)
MCHC RBC AUTO-ENTMCNC: 31.1 G/DL (ref 30–36.5)
MCV RBC AUTO: 88 FL (ref 80–99)
MONOCYTES # BLD: 0.5 K/UL (ref 0–1)
MONOCYTES NFR BLD: 7 % (ref 5–13)
NEUTS SEG # BLD: 4.5 K/UL (ref 1.8–8)
NEUTS SEG NFR BLD: 67 % (ref 32–75)
NRBC # BLD: 0 K/UL (ref 0–0.01)
NRBC BLD-RTO: 0 PER 100 WBC
PLATELET # BLD AUTO: 238 K/UL (ref 150–400)
PMV BLD AUTO: 10.8 FL (ref 8.9–12.9)
RBC # BLD AUTO: 4.68 M/UL (ref 3.8–5.2)
SAMPLES BEING HELD,HOLD: NORMAL
WBC # BLD AUTO: 6.6 K/UL (ref 3.6–11)

## 2020-07-05 PROCEDURE — 93005 ELECTROCARDIOGRAM TRACING: CPT

## 2020-07-05 PROCEDURE — 85025 COMPLETE CBC W/AUTO DIFF WBC: CPT

## 2020-07-05 PROCEDURE — 84484 ASSAY OF TROPONIN QUANT: CPT

## 2020-07-05 PROCEDURE — 71046 X-RAY EXAM CHEST 2 VIEWS: CPT

## 2020-07-05 PROCEDURE — 80053 COMPREHEN METABOLIC PANEL: CPT

## 2020-07-05 PROCEDURE — 99283 EMERGENCY DEPT VISIT LOW MDM: CPT

## 2020-07-05 PROCEDURE — 36415 COLL VENOUS BLD VENIPUNCTURE: CPT

## 2020-07-06 VITALS
WEIGHT: 117 LBS | HEIGHT: 63 IN | RESPIRATION RATE: 18 BRPM | SYSTOLIC BLOOD PRESSURE: 131 MMHG | DIASTOLIC BLOOD PRESSURE: 89 MMHG | OXYGEN SATURATION: 99 % | TEMPERATURE: 98 F | HEART RATE: 67 BPM | BODY MASS INDEX: 20.73 KG/M2

## 2020-07-06 LAB
ALBUMIN SERPL-MCNC: 3.6 G/DL (ref 3.5–5)
ALBUMIN/GLOB SERPL: 0.9 {RATIO} (ref 1.1–2.2)
ALP SERPL-CCNC: 130 U/L (ref 45–117)
ALT SERPL-CCNC: 29 U/L (ref 12–78)
ANION GAP SERPL CALC-SCNC: 5 MMOL/L (ref 5–15)
AST SERPL-CCNC: 24 U/L (ref 15–37)
ATRIAL RATE: 68 BPM
BILIRUB SERPL-MCNC: 0.1 MG/DL (ref 0.2–1)
BUN SERPL-MCNC: 11 MG/DL (ref 6–20)
BUN/CREAT SERPL: 15 (ref 12–20)
CALCIUM SERPL-MCNC: 8.4 MG/DL (ref 8.5–10.1)
CALCULATED P AXIS, ECG09: 56 DEGREES
CALCULATED R AXIS, ECG10: 9 DEGREES
CALCULATED T AXIS, ECG11: 11 DEGREES
CHLORIDE SERPL-SCNC: 104 MMOL/L (ref 97–108)
CO2 SERPL-SCNC: 29 MMOL/L (ref 21–32)
CREAT SERPL-MCNC: 0.72 MG/DL (ref 0.55–1.02)
DIAGNOSIS, 93000: NORMAL
GLOBULIN SER CALC-MCNC: 4 G/DL (ref 2–4)
GLUCOSE SERPL-MCNC: 190 MG/DL (ref 65–100)
P-R INTERVAL, ECG05: 112 MS
POTASSIUM SERPL-SCNC: 3.6 MMOL/L (ref 3.5–5.1)
PROT SERPL-MCNC: 7.6 G/DL (ref 6.4–8.2)
Q-T INTERVAL, ECG07: 376 MS
QRS DURATION, ECG06: 82 MS
QTC CALCULATION (BEZET), ECG08: 399 MS
SODIUM SERPL-SCNC: 138 MMOL/L (ref 136–145)
TROPONIN I SERPL-MCNC: <0.05 NG/ML
VENTRICULAR RATE, ECG03: 68 BPM

## 2020-07-06 NOTE — ED TRIAGE NOTES
Patient arrives to ED with complaints of chest tightness, shortness of breath and abdominal pain since Thursday     Patient states she has concern because of a history 4 pneumothorax

## 2020-07-06 NOTE — ED PROVIDER NOTES
51-year-old female with a history of spontaneous pneumothorax, anemia, diabetes presents with shortness of breath that started the last 2 days. Several years ago she had 4 spontaneous pneumothoraces and believes that this feels similar. She has associated chest tightness. She denies any fever, chills, cough. She denies any leg swelling. She is never had a blood clot before. She denies any other medical concerns at this time. She is not on birth control pills. She has never had a blood clot. Shortness of Breath   Associated symptoms include chest pain and abdominal pain. Abdominal Pain    Associated symptoms include chest pain. Chest Pain (Angina)    Associated symptoms include abdominal pain and shortness of breath.         Past Medical History:   Diagnosis Date    Anemia     Anemia     Diabetes ([address]     type 1    Dysmenorrhea     Endometriosis     GERD (gastroesophageal reflux disease)     Mitral valve prolapse     Spontaneous pneumothorax 3/2013 and 8/2013    recurrent right--total of 4 episodes, 2 required surgeries, above dates       Past Surgical History:   Procedure Laterality Date    HX DILATION AND CURETTAGE  09/20/2018    HX OTHER SURGICAL  2009    ex lap, abdominal for endometriosis    HX OTHER SURGICAL  3/21/2013    Bronch, RVATS, apical bleb rsxn,parietal pleurectomy    HX OTHER SURGICAL  8/21/2013    Bronchoscopy, RVATS and talc pleurodesis         Family History:   Problem Relation Age of Onset    Hypertension Mother     Diabetes Father     Heart Disease Father     Anemia Father        Social History     Socioeconomic History    Marital status:      Spouse name: Not on file    Number of children: Not on file    Years of education: Not on file    Highest education level: Not on file   Occupational History    Not on file   Social Needs    Financial resource strain: Not on file    Food insecurity     Worry: Not on file     Inability: Not on file   [facility] Transportation needs     Medical: Not on file     Non-medical: Not on file   Tobacco Use    Smoking status: Never Smoker    Smokeless tobacco: Never Used   Substance and Sexual Activity    Alcohol use: Yes     Comment: rarely    Drug use: No    Sexual activity: Not on file   Lifestyle    Physical activity     Days per week: Not on file     Minutes per session: Not on file    Stress: Not on file   Relationships    Social connections     Talks on phone: Not on file     Gets together: Not on file     Attends Shinto service: Not on file     Active member of club or organization: Not on file     Attends meetings of clubs or organizations: Not on file     Relationship status: Not on file    Intimate partner violence     Fear of current or ex partner: Not on file     Emotionally abused: Not on file     Physically abused: Not on file     Forced sexual activity: Not on file   Other Topics Concern    Not on file   Social History Narrative    Not on file         ALLERGIES: Patient has no known allergies. Review of Systems   Respiratory: Positive for shortness of breath. Cardiovascular: Positive for chest pain. Gastrointestinal: Positive for abdominal pain. Vitals:    07/05/20 2246   BP: 143/87   Pulse: 77   Resp: 18   Temp: 98 °F (36.7 °C)   SpO2: 100%   Weight: 53.1 kg (117 lb)   Height: 5' 3\" (1.6 m)            Physical Exam  Vitals signs and nursing note reviewed. Constitutional:       General: She is not in acute distress. HENT:      Head: Normocephalic and atraumatic. Mouth/Throat:      Mouth: Mucous membranes are moist.   Eyes:      General: No scleral icterus. Conjunctiva/sclera: Conjunctivae normal.      Pupils: Pupils are equal, round, and reactive to light. Neck:      Musculoskeletal: Neck supple. Trachea: No tracheal deviation. Cardiovascular:      Rate and Rhythm: Normal rate and regular rhythm.    Pulmonary:      Effort: Pulmonary effort is normal. No respiratory distress. Breath sounds: No wheezing or rales. Abdominal:      General: There is no distension. Palpations: Abdomen is soft. Tenderness: There is no abdominal tenderness. Genitourinary:     Comments: deferred  Musculoskeletal:         General: No deformity. Skin:     General: Skin is warm and dry. Neurological:      General: No focal deficit present. Mental Status: She is alert. Psychiatric:         Mood and Affect: Mood normal.          [facility]  ED Course as of Jul 06 0303   Sun Jul 05, 2020   2342 EKG shows normal sinus rhythm at rate of 68, normal intervals, normal axis, normal ST segments and T waves, possible prior septal infarct    [TT]      ED Course User Index  [TT] Manjinder Charles MD       Procedures        1:03 AM  Patient re-evaluated. All questions answered. Patient appropriate for discharge. Given return precautions and follow up instructions. LABORATORY TESTS:  Labs Reviewed   CBC WITH AUTOMATED DIFF - Abnormal; Notable for the following components:       Result Value    IMMATURE GRANULOCYTES 1 (*)     ABS. IMM. GRANS. 0.1 (*)     All other components within normal limits   METABOLIC PANEL, COMPREHENSIVE - Abnormal; Notable for the following components:    Glucose 190 (*)     Calcium 8.4 (*)     Bilirubin, total 0.1 (*)     Alk. phosphatase 130 (*)     A-G Ratio 0.9 (*)     All other components within normal limits   SAMPLES BEING HELD   TROPONIN I       IMAGING RESULTS:  XR CHEST PA LAT   Final Result   IMPRESSION:      No acute process. Stable chronic changes in the right hemithorax. MEDICATIONS GIVEN:  Medications - No data to display    IMPRESSION:  1. SOB (shortness of breath)        PLAN:  1. Discharge Medication List as of 7/6/2020  1:10 AM        2.    Follow-up Information     Follow up With Specialties Details Why Contact Info    Jennifer Anderson MD [facility] Schedule an appointment as soon as possible for a visit  [facility] [address] [address]  [phone]      [facility] EMERGENCY DEPT Emergency Medicine  If symptoms worsen or new concerns [address] [place] [address]  [phone]        3. Return to ED for new or worsening symptoms       Bird Higgins.  Angel Mak MD

## 2021-01-05 ENCOUNTER — APPOINTMENT (OUTPATIENT)
Dept: GENERAL RADIOLOGY | Age: 46
End: 2021-01-05
Attending: NURSE PRACTITIONER
Payer: COMMERCIAL

## 2021-01-05 ENCOUNTER — APPOINTMENT (OUTPATIENT)
Dept: CT IMAGING | Age: 46
End: 2021-01-05
Attending: EMERGENCY MEDICINE
Payer: COMMERCIAL

## 2021-01-05 ENCOUNTER — HOSPITAL ENCOUNTER (OUTPATIENT)
Age: 46
Setting detail: OBSERVATION
Discharge: HOME | End: 2021-01-06
Attending: EMERGENCY MEDICINE | Admitting: SURGERY
Payer: COMMERCIAL

## 2021-01-05 DIAGNOSIS — K66.8 PERITONEAL FREE AIR: Primary | ICD-10-CM

## 2021-01-05 DIAGNOSIS — R06.02 SOB (SHORTNESS OF BREATH): ICD-10-CM

## 2021-01-05 DIAGNOSIS — J43.9 BULLA, LUNG (HCC): ICD-10-CM

## 2021-01-05 LAB
ALBUMIN SERPL-MCNC: 3.9 G/DL (ref 3.5–5)
ALBUMIN/GLOB SERPL: 1 {RATIO} (ref 1.1–2.2)
ALP SERPL-CCNC: 138 U/L (ref 45–117)
ALT SERPL-CCNC: 32 U/L (ref 12–78)
ANION GAP SERPL CALC-SCNC: 4 MMOL/L (ref 5–15)
AST SERPL-CCNC: 16 U/L (ref 15–37)
BASOPHILS # BLD: 0 K/UL (ref 0–0.1)
BASOPHILS NFR BLD: 0 % (ref 0–1)
BILIRUB SERPL-MCNC: 0.3 MG/DL (ref 0.2–1)
BUN SERPL-MCNC: 13 MG/DL (ref 6–20)
BUN/CREAT SERPL: 13 (ref 12–20)
CALCIUM SERPL-MCNC: 9.1 MG/DL (ref 8.5–10.1)
CHLORIDE SERPL-SCNC: 102 MMOL/L (ref 97–108)
CO2 SERPL-SCNC: 28 MMOL/L (ref 21–32)
COMMENT, HOLDF: NORMAL
COMMENT, HOLDF: NORMAL
CREAT SERPL-MCNC: 0.99 MG/DL (ref 0.55–1.02)
DIFFERENTIAL METHOD BLD: NORMAL
EOSINOPHIL # BLD: 0.1 K/UL (ref 0–0.4)
EOSINOPHIL NFR BLD: 1 % (ref 0–7)
ERYTHROCYTE [DISTWIDTH] IN BLOOD BY AUTOMATED COUNT: 13 % (ref 11.5–14.5)
GLOBULIN SER CALC-MCNC: 4.1 G/DL (ref 2–4)
GLUCOSE SERPL-MCNC: 321 MG/DL (ref 65–100)
HCG SERPL QL: NEGATIVE
HCT VFR BLD AUTO: 42.7 % (ref 35–47)
HGB BLD-MCNC: 13.7 G/DL (ref 11.5–16)
IMM GRANULOCYTES # BLD AUTO: 0 K/UL (ref 0–0.04)
IMM GRANULOCYTES NFR BLD AUTO: 0 % (ref 0–0.5)
LYMPHOCYTES # BLD: 1.6 K/UL (ref 0.8–3.5)
LYMPHOCYTES NFR BLD: 18 % (ref 12–49)
MCH RBC QN AUTO: 26.6 PG (ref 26–34)
MCHC RBC AUTO-ENTMCNC: 32.1 G/DL (ref 30–36.5)
MCV RBC AUTO: 82.8 FL (ref 80–99)
MONOCYTES # BLD: 0.5 K/UL (ref 0–1)
MONOCYTES NFR BLD: 6 % (ref 5–13)
NEUTS SEG # BLD: 6.2 K/UL (ref 1.8–8)
NEUTS SEG NFR BLD: 75 % (ref 32–75)
NRBC # BLD: 0 K/UL (ref 0–0.01)
NRBC BLD-RTO: 0 PER 100 WBC
PLATELET # BLD AUTO: 258 K/UL (ref 150–400)
PMV BLD AUTO: 10.9 FL (ref 8.9–12.9)
POTASSIUM SERPL-SCNC: 3.8 MMOL/L (ref 3.5–5.1)
PROT SERPL-MCNC: 8 G/DL (ref 6.4–8.2)
RBC # BLD AUTO: 5.16 M/UL (ref 3.8–5.2)
SAMPLES BEING HELD,HOLD: NORMAL
SAMPLES BEING HELD,HOLD: NORMAL
SODIUM SERPL-SCNC: 134 MMOL/L (ref 136–145)
TROPONIN I SERPL-MCNC: <0.05 NG/ML
WBC # BLD AUTO: 8.5 K/UL (ref 3.6–11)

## 2021-01-05 PROCEDURE — 74011250636 HC RX REV CODE- 250/636: Performed by: EMERGENCY MEDICINE

## 2021-01-05 PROCEDURE — 99218 HC RM OBSERVATION: CPT

## 2021-01-05 PROCEDURE — 85025 COMPLETE CBC W/AUTO DIFF WBC: CPT

## 2021-01-05 PROCEDURE — 74011000636 HC RX REV CODE- 636: Performed by: RADIOLOGY

## 2021-01-05 PROCEDURE — 80053 COMPREHEN METABOLIC PANEL: CPT

## 2021-01-05 PROCEDURE — 84484 ASSAY OF TROPONIN QUANT: CPT

## 2021-01-05 PROCEDURE — 84703 CHORIONIC GONADOTROPIN ASSAY: CPT

## 2021-01-05 PROCEDURE — 36415 COLL VENOUS BLD VENIPUNCTURE: CPT

## 2021-01-05 PROCEDURE — 71260 CT THORAX DX C+: CPT

## 2021-01-05 PROCEDURE — 99283 EMERGENCY DEPT VISIT LOW MDM: CPT

## 2021-01-05 PROCEDURE — 74177 CT ABD & PELVIS W/CONTRAST: CPT

## 2021-01-05 PROCEDURE — 71046 X-RAY EXAM CHEST 2 VIEWS: CPT

## 2021-01-05 RX ORDER — SODIUM CHLORIDE, SODIUM LACTATE, POTASSIUM CHLORIDE, CALCIUM CHLORIDE 600; 310; 30; 20 MG/100ML; MG/100ML; MG/100ML; MG/100ML
75 INJECTION, SOLUTION INTRAVENOUS CONTINUOUS
Status: DISCONTINUED | OUTPATIENT
Start: 2021-01-05 | End: 2021-01-06 | Stop reason: HOSPADM

## 2021-01-05 RX ORDER — SODIUM CHLORIDE 0.9 % (FLUSH) 0.9 %
5-40 SYRINGE (ML) INJECTION AS NEEDED
Status: DISCONTINUED | OUTPATIENT
Start: 2021-01-05 | End: 2021-01-06 | Stop reason: HOSPADM

## 2021-01-05 RX ORDER — SODIUM CHLORIDE 0.9 % (FLUSH) 0.9 %
5-40 SYRINGE (ML) INJECTION EVERY 8 HOURS
Status: DISCONTINUED | OUTPATIENT
Start: 2021-01-05 | End: 2021-01-06 | Stop reason: HOSPADM

## 2021-01-05 RX ADMIN — SODIUM CHLORIDE 1000 ML: 9 INJECTION, SOLUTION INTRAVENOUS at 21:13

## 2021-01-05 RX ADMIN — IOPAMIDOL 100 ML: 755 INJECTION, SOLUTION INTRAVENOUS at 21:41

## 2021-01-06 ENCOUNTER — APPOINTMENT (OUTPATIENT)
Dept: GENERAL RADIOLOGY | Age: 46
End: 2021-01-06
Attending: SURGERY
Payer: COMMERCIAL

## 2021-01-06 VITALS
WEIGHT: 124.56 LBS | HEART RATE: 88 BPM | HEIGHT: 63 IN | TEMPERATURE: 97.8 F | BODY MASS INDEX: 22.07 KG/M2 | OXYGEN SATURATION: 97 % | DIASTOLIC BLOOD PRESSURE: 74 MMHG | RESPIRATION RATE: 20 BRPM | SYSTOLIC BLOOD PRESSURE: 112 MMHG

## 2021-01-06 PROBLEM — K66.8 PNEUMOPERITONEUM: Status: ACTIVE | Noted: 2021-01-06

## 2021-01-06 PROBLEM — K21.9 GERD (GASTROESOPHAGEAL REFLUX DISEASE): Status: ACTIVE | Noted: 2021-01-06

## 2021-01-06 LAB
BASOPHILS # BLD: 0 K/UL (ref 0–0.1)
BASOPHILS NFR BLD: 0 % (ref 0–1)
DIFFERENTIAL METHOD BLD: NORMAL
EOSINOPHIL # BLD: 0.1 K/UL (ref 0–0.4)
EOSINOPHIL NFR BLD: 2 % (ref 0–7)
ERYTHROCYTE [DISTWIDTH] IN BLOOD BY AUTOMATED COUNT: 13.1 % (ref 11.5–14.5)
GLUCOSE BLD STRIP.AUTO-MCNC: 221 MG/DL (ref 65–100)
GLUCOSE BLD STRIP.AUTO-MCNC: 252 MG/DL (ref 65–100)
HCT VFR BLD AUTO: 40.7 % (ref 35–47)
HGB BLD-MCNC: 12.9 G/DL (ref 11.5–16)
IMM GRANULOCYTES # BLD AUTO: 0 K/UL (ref 0–0.04)
IMM GRANULOCYTES NFR BLD AUTO: 0 % (ref 0–0.5)
LYMPHOCYTES # BLD: 1.4 K/UL (ref 0.8–3.5)
LYMPHOCYTES NFR BLD: 18 % (ref 12–49)
MCH RBC QN AUTO: 26.2 PG (ref 26–34)
MCHC RBC AUTO-ENTMCNC: 31.7 G/DL (ref 30–36.5)
MCV RBC AUTO: 82.6 FL (ref 80–99)
MONOCYTES # BLD: 0.5 K/UL (ref 0–1)
MONOCYTES NFR BLD: 7 % (ref 5–13)
NEUTS SEG # BLD: 5.5 K/UL (ref 1.8–8)
NEUTS SEG NFR BLD: 73 % (ref 32–75)
NRBC # BLD: 0 K/UL (ref 0–0.01)
NRBC BLD-RTO: 0 PER 100 WBC
PLATELET # BLD AUTO: 238 K/UL (ref 150–400)
PMV BLD AUTO: 11.3 FL (ref 8.9–12.9)
RBC # BLD AUTO: 4.93 M/UL (ref 3.8–5.2)
SERVICE CMNT-IMP: ABNORMAL
SERVICE CMNT-IMP: ABNORMAL
WBC # BLD AUTO: 7.5 K/UL (ref 3.6–11)

## 2021-01-06 PROCEDURE — 36415 COLL VENOUS BLD VENIPUNCTURE: CPT

## 2021-01-06 PROCEDURE — 74011250636 HC RX REV CODE- 250/636: Performed by: SURGERY

## 2021-01-06 PROCEDURE — 85025 COMPLETE CBC W/AUTO DIFF WBC: CPT

## 2021-01-06 PROCEDURE — 71045 X-RAY EXAM CHEST 1 VIEW: CPT

## 2021-01-06 PROCEDURE — 82962 GLUCOSE BLOOD TEST: CPT

## 2021-01-06 PROCEDURE — 99218 HC RM OBSERVATION: CPT

## 2021-01-06 PROCEDURE — 74011636637 HC RX REV CODE- 636/637: Performed by: INTERNAL MEDICINE

## 2021-01-06 RX ORDER — INSULIN GLARGINE 100 [IU]/ML
15 INJECTION, SOLUTION SUBCUTANEOUS DAILY
Status: DISCONTINUED | OUTPATIENT
Start: 2021-01-06 | End: 2021-01-06 | Stop reason: HOSPADM

## 2021-01-06 RX ORDER — MAGNESIUM SULFATE 100 %
4 CRYSTALS MISCELLANEOUS AS NEEDED
Status: DISCONTINUED | OUTPATIENT
Start: 2021-01-06 | End: 2021-01-06 | Stop reason: HOSPADM

## 2021-01-06 RX ORDER — INSULIN LISPRO 100 [IU]/ML
INJECTION, SOLUTION INTRAVENOUS; SUBCUTANEOUS EVERY 6 HOURS
Status: DISCONTINUED | OUTPATIENT
Start: 2021-01-06 | End: 2021-01-06 | Stop reason: HOSPADM

## 2021-01-06 RX ORDER — DEXTROSE 50 % IN WATER (D50W) INTRAVENOUS SYRINGE
12.5-25 AS NEEDED
Status: DISCONTINUED | OUTPATIENT
Start: 2021-01-06 | End: 2021-01-06 | Stop reason: HOSPADM

## 2021-01-06 RX ADMIN — SODIUM CHLORIDE, POTASSIUM CHLORIDE, SODIUM LACTATE AND CALCIUM CHLORIDE 75 ML/HR: 600; 310; 30; 20 INJECTION, SOLUTION INTRAVENOUS at 01:21

## 2021-01-06 RX ADMIN — INSULIN GLARGINE 15 UNITS: 100 INJECTION, SOLUTION SUBCUTANEOUS at 11:58

## 2021-01-06 RX ADMIN — INSULIN LISPRO 3 UNITS: 100 INJECTION, SOLUTION INTRAVENOUS; SUBCUTANEOUS at 11:58

## 2021-01-06 NOTE — PROGRESS NOTES
Hospital follow-up PCP transitional care appointment has been scheduled with Dr. Eliane Esquivel for Monday, 1/11/21 at 11:15 a.m. Pending patient discharge.   [place] Good, Care Management Specialist.

## 2021-01-06 NOTE — H&P
Assessment:     40 yo woman with free air noted on CT     Plan:   Exam completely benign  Regular diet - if tolerates ok for d/c home  Discussed signs and symptoms that would require return to ER including fevers chills and increased pain. She understands and will return if needed. Signed By: Irlanda Davila MD  [facility]  [phone]    January 6, 2021          General Surgery History and Physical    Subjective:      Imer Adler is a 39 y.o.  female who presents with SOB. She reports a history of pneumothorax in the past and had acute onset of SOB which she felt was similar to her previous episodes of ptx. She denies any fevers or chills. She denies any nausea or vomiting. She denies any abdominal pain. She does not smoke or take NSAIDs routinely. She has had a hysterectomy in the past but no other abdominal surgery. No history of diverticulitis or any GI issues in the past.  She has no complaints today.     Past Medical History:   Diagnosis Date    Anemia     Anemia     Diabetes ([address]     type 1    Dysmenorrhea     Endometriosis     GERD (gastroesophageal reflux disease)     Mitral valve prolapse     Spontaneous pneumothorax 3/2013 and 8/2013    recurrent right--total of 4 episodes, 2 required surgeries, above dates     Past Surgical History:   Procedure Laterality Date    HX DILATION AND CURETTAGE  09/20/2018    HX OTHER SURGICAL  2009    ex lap, abdominal for endometriosis    HX OTHER SURGICAL  3/21/2013    Bronch, RVATS, apical bleb rsxn,parietal pleurectomy    HX OTHER SURGICAL  8/21/2013    Bronchoscopy, RVATS and talc pleurodesis      Family History   Problem Relation Age of Onset    Hypertension Mother     Diabetes Father     Heart Disease Father     Anemia Father      Social History     Socioeconomic History    Marital status:      Spouse name: Not on file    Number of children: Not on file    Years of education: Not on file   Kirsten Diaz Highest education level: Not on file   Tobacco Use    Smoking status: Never Smoker    Smokeless tobacco: Never Used   Substance and Sexual Activity    Alcohol use: Yes     Comment: rarely    Drug use: No      Current Facility-Administered Medications   Medication Dose Route Frequency    insulin lispro (HUMALOG) injection   SubCUTAneous Q6H    glucose chewable tablet 16 g  4 Tab Oral PRN    dextrose (D50W) injection syrg 12.5-25 g  12.5-25 g IntraVENous PRN    glucagon (GLUCAGEN) injection 1 mg  1 mg IntraMUSCular PRN    insulin glargine (LANTUS) injection 15 Units  15 Units SubCUTAneous DAILY    sodium chloride (NS) flush 5-40 mL  5-40 mL IntraVENous Q8H    sodium chloride (NS) flush 5-40 mL  5-40 mL IntraVENous PRN    lactated Ringers infusion  75 mL/hr IntraVENous CONTINUOUS      No Known Allergies    Review of Systems:     []     Unable to obtain  ROS due to  []    mental status change  []    sedated   []    intubated   [x]    Total of 12 system negative, unless specified below or in HPI:  Constitutional: negative fever, negative chills, negative weight loss  Eyes:   negative visual changes  ENT:   negative sore throat, tongue or lip swelling  Respiratory:  negative cough, negative dyspnea  Cards:  negative for chest pain, palpitations, lower extremity edema  GI:   negative for nausea, vomiting, diarrhea, and abdominal pain  :  negative for frequency, dysuria  Integument:  negative for rash and pruritus  Heme:  negative for easy bruising and gum/nose bleeding  Musculoskel: negative for myalgias,  back pain and muscle weakness  Neuro:  negative for headaches, dizziness, vertigo  Psych:  negative for feelings of anxiety, depression     Objective:        Patient Vitals for the past 8 hrs:   BP Temp Pulse Resp SpO2   21 1143 112/74 97.8 °F (36.6 °C) 88 20 97 %   21 0901 107/69 97.8 °F (36.6 °C) 90 18 96 %       Temp (24hrs), Av.1 °F (36.7 °C), Min:97.8 °F (36.6 °C), Max:98.7 °F (37.1 °C)      Physical Exam:  General:  Alert, cooperative, no distress, appears stated age. Eyes:  Conjunctivae clear. PERRL, EOMs intact. Nose: Nares normal. Septum midline. Mucosa normal. No drainage or sinus tenderness. Mouth/Throat: Lips, mucosa, and tongue normal. Teeth and gums normal.   Neck: Supple, symmetrical, trachea midline   Back:   Symmetric, no curvature. ROM normal. No CVA tenderness. Lungs:   Clear to auscultation bilaterally. Heart:  Regular rate and rhythm,   Abdomen:   Soft, non-tender. Bowel sounds normal. No masses,  No organomegaly. Extremities: Extremities normal, atraumatic, no cyanosis or edema.        Skin: Skin color, texture, turgor normal. No rashes or lesions         BMP:   Lab Results   Component Value Date/Time     (L) 01/05/2021 08:39 PM    K 3.8 01/05/2021 08:39 PM     01/05/2021 08:39 PM    CO2 28 01/05/2021 08:39 PM    AGAP 4 (L) 01/05/2021 08:39 PM     (H) 01/05/2021 08:39 PM    BUN 13 01/05/2021 08:39 PM    CREA 0.99 01/05/2021 08:39 PM    GFRAA >60 01/05/2021 08:39 PM    GFRNA >60 01/05/2021 08:39 PM     CMP:   Lab Results   Component Value Date/Time     (L) 01/05/2021 08:39 PM    K 3.8 01/05/2021 08:39 PM     01/05/2021 08:39 PM    CO2 28 01/05/2021 08:39 PM    AGAP 4 (L) 01/05/2021 08:39 PM     (H) 01/05/2021 08:39 PM    BUN 13 01/05/2021 08:39 PM    CREA 0.99 01/05/2021 08:39 PM    GFRAA >60 01/05/2021 08:39 PM    GFRNA >60 01/05/2021 08:39 PM    CA 9.1 01/05/2021 08:39 PM    ALB 3.9 01/05/2021 08:39 PM    TP 8.0 01/05/2021 08:39 PM    GLOB 4.1 (H) 01/05/2021 08:39 PM    AGRAT 1.0 (L) 01/05/2021 08:39 PM    ALT 32 01/05/2021 08:39 PM     CBC:   Lab Results   Component Value Date/Time    WBC 7.5 01/06/2021 04:04 AM    HGB 12.9 01/06/2021 04:04 AM    HCT 40.7 01/06/2021 04:04 AM     01/06/2021 04:04 AM     All Cardiac Markers in the last 24 hours:   Lab Results   Component Value Date/Time    TROIQ <0.05 01/05/2021 08:39 PM ABG: No results found for: PH, PHI, PCO2, PCO2I, PO2, PO2I, HCO3, HCO3I, FIO2, FIO2I  COAGS: No results found for: APTT, PTP, INR, INREXT, INREXT  Pancreatic Markers: No results found for: AMYLPOCT, AML, LIPPOCT, LPSE

## 2021-01-06 NOTE — ED NOTES
TRANSFER - OUT REPORT:    Verbal report given to [employer] (name) on Elvia Trujillo  being transferred to 4th floor (unit) for routine progression of care       Report consisted of patients Situation, Background, Assessment and   Recommendations(SBAR). Information from the following report(s) SBAR, ED Summary and Recent Results was reviewed with the receiving nurse. Lines:   Peripheral IV 01/05/21 Right Antecubital (Active)   Site Assessment Clean, dry, & intact 01/05/21 2043   Phlebitis Assessment 0 01/05/21 2043   Infiltration Assessment 0 01/05/21 2043   Dressing Status Clean, dry, & intact 01/05/21 2043   Dressing Type Tape;Transparent 01/05/21 2043   Hub Color/Line Status Pink;Patent; Flushed 01/05/21 2043   Action Taken Blood drawn 01/05/21 2043        Opportunity for questions and clarification was provided.       Patient transported with:   Registered Nurse  Tech

## 2021-01-06 NOTE — DISCHARGE SUMMARY
Discharge Summary    Patient: Mirna Moore               Sex: female          DOA: 1/5/2021  8:31 PM       YOB: 1975      Age:  39 y.o.        LOS:  LOS: 0 days                Discharge Date:  1/6/21    Admission Diagnoses: Shortness of breath [R06.02]    Discharge Diagnoses:  Same    Procedure:  none    Discharge Condition: Good    Hospital Course: Pt admitted for observation due to abnormal CT finding. On follow up exam she remains asymptomatic with stable vitals. Discharge to home in stable condition. Consults: None    Significant Diagnostic Studies: See full electronic record. Discharge Medications:     Current Discharge Medication List      CONTINUE these medications which have NOT CHANGED    Details   buPROPion (WELLBUTRIN) 75 mg tablet Take 75 mg by mouth two (2) times a day. multivit-minerals/folic acid (WOMENS DAILY GUMMIES PO) Take 1 g by mouth daily. insulin degludec (TRESIBA FLEXTOUCH U-100) 100 unit/mL (3 mL) inpn 32 Units by SubCUTAneous route nightly. insulin aspart (NOVOLOG FLEXPEN) 100 unit/mL inpn by SubCUTAneous route Before breakfast, lunch, and dinner. Sliding scale      omeprazole (PRILOSEC) 20 mg capsule Take 20 mg by mouth daily as needed. Activity/Diet/Wound Care: See patient administered discharge instructions.     Follow-up: KANG Culver MD  [facility]  [phone]

## 2021-01-06 NOTE — PROGRESS NOTES
1/6/2021  2:40 PM  Observation notice provided in writing to patient and/or caregiver as well as verbal explanation of the policy. Patients who are in outpatient status also receive the Observation notice.       Discharge Location  Discharge Placement: Home with family assistance

## 2021-01-06 NOTE — PROGRESS NOTES
Attempted to schedule hospital follow up PCP appointment. Unable to reach practice. Left voicemail message with call back information. Pending patient discharge.   Cristian Carpio, Care Management Specialist.

## 2021-01-06 NOTE — PROGRESS NOTES
[facility]: MED RECONCILIATION    Medications removed:  KCl 20meq BID  Ferrous sulfate daily    Information obtained from: Patient (alert, oriented, reliable), RN that performed initial med rec, RxQuery (data available), previous medical records      Allergies: Patient has no known allergies. Prior to Admission Medications:     Medication Documentation Review Audit       Reviewed by Miles Jay, PHARMD (Pharmacist) on 01/06/21 at 0665      Medication Sig Documenting Provider Last Dose Status Taking? buPROPion (WELLBUTRIN) 75 mg tablet Take 75 mg by mouth two (2) times a day. Provider, Historical 1/5/2021 0700 Active Yes   insulin aspart (NOVOLOG FLEXPEN) 100 unit/mL inpn by SubCUTAneous route Before breakfast, lunch, and dinner. Sliding scale Provider, Historical 1/5/2021 1900 Active Yes           Med Note (Orlene Prim Wed Sep 26, 2018  7:31 PM)     insulin degludec (TRESIBA FLEXTOUCH U-100) 100 unit/mL (3 mL) inpn 32 Units by SubCUTAneous route nightly. Provider, Historical 1/6/2021 0000 Active Yes   multivit-minerals/folic acid (WOMENS DAILY GUMMIES PO) Take 1 g by mouth daily. Provider, Historical 1/5/2021 0700 Active Yes   omeprazole (PRILOSEC) 20 mg capsule Take 20 mg by mouth daily as needed. Provider, Historical 12/6/2020 Unknown time Active Yes                      Sylvester Gordon

## 2021-01-06 NOTE — DISCHARGE INSTRUCTIONS
Patient Education   PLEASE CALL:  If inc pain, fever, n/v. If significant Shortness of breath go to the ER. Shortness of Breath: Care Instructions  Your Care Instructions     Shortness of breath has many causes. Sometimes conditions such as anxiety can lead to shortness of breath. Some people get mild shortness of breath when they exercise. Trouble breathing also can be a symptom of a serious problem, such as asthma, lung disease, emphysema, heart problems, and pneumonia. If your shortness of breath continues, you may need tests and treatment. Watch for any changes in your breathing and other symptoms. Follow-up care is a key part of your treatment and safety. Be sure to make and go to all appointments, and call your doctor if you are having problems. It's also a good idea to know your test results and keep a list of the medicines you take. How can you care for yourself at home? · Do not smoke or allow others to smoke around you. If you need help quitting, talk to your doctor about stop-smoking programs and medicines. These can increase your chances of quitting for good. · Get plenty of rest and sleep. · Take your medicines exactly as prescribed. Call your doctor if you think you are having a problem with your medicine. · Find healthy ways to deal with stress. ? Exercise daily. ? Get plenty of sleep. ? Eat regularly and well. When should you call for help? Call 911 anytime you think you may need emergency care. For example, call if:    · You have severe shortness of breath.     · You have symptoms of a heart attack. These may include:  ? Chest pain or pressure, or a strange feeling in the chest.  ? Sweating. ? Shortness of breath. ? Nausea or vomiting. ? Pain, pressure, or a strange feeling in the back, neck, jaw, or upper belly or in one or both shoulders or arms. ? Lightheadedness or sudden weakness. ? A fast or irregular heartbeat.   After you call 911, the  may tell you to chew 1 adult-strength or 2 to 4 low-dose aspirin. Wait for an ambulance. Do not try to drive yourself. Call your doctor now or seek immediate medical care if:    · Your shortness of breath gets worse or you start to wheeze. Wheezing is a high-pitched sound when you breathe.     · You wake up at night out of breath or have to prop your head up on several pillows to breathe.     · You are short of breath after only light activity or while at rest.   Watch closely for changes in your health, and be sure to contact your doctor if:    · You do not get better over the next 1 to 2 days. Where can you learn more? Go to http://www.gray.com/  Enter S780 in the search box to learn more about \"Shortness of Breath: Care Instructions. \"  Current as of: February 24, 2020               Content Version: 12.6  © 7243-8581 [employer]. Care instructions adapted under license by [employer] (which disclaims liability or warranty for this information). If you have questions about a medical condition or this instruction, always ask your healthcare professional. [address] any warranty or liability for your use of this information.

## 2021-01-06 NOTE — ED PROVIDER NOTES
Ms. Bharath Payne is a 39yo female who presents to the ER with complaints of shortness of breath. She states that she began to feel bad 4 days ago. She has a history of pneumothoraces. She reports her last was in 2013. She is not seeing a thoracic surgeon every 5 years. She reports that she started little bit of discomfort in her right lateral chest, consistent with her previous pneumothoraces. She also notes some mild shortness of breath with exertion. She denies any shortness of breath or symptoms at rest.  No fevers or chills. No runny nose, sore throat, cough. She denies any other complaints.            Past Medical History:   Diagnosis Date    Anemia     Anemia     Diabetes ([address]     type 1    Dysmenorrhea     Endometriosis     GERD (gastroesophageal reflux disease)     Mitral valve prolapse     Spontaneous pneumothorax 3/2013 and 8/2013    recurrent right--total of 4 episodes, 2 required surgeries, above dates       Past Surgical History:   Procedure Laterality Date    HX DILATION AND CURETTAGE  09/20/2018    HX OTHER SURGICAL  2009    ex lap, abdominal for endometriosis    HX OTHER SURGICAL  3/21/2013    Bronch, RVATS, apical bleb rsxn,parietal pleurectomy    HX OTHER SURGICAL  8/21/2013    Bronchoscopy, RVATS and talc pleurodesis         Family History:   Problem Relation Age of Onset    Hypertension Mother     Diabetes Father     Heart Disease Father     Anemia Father        Social History     Socioeconomic History    Marital status:      Spouse name: Not on file    Number of children: Not on file    Years of education: Not on file    Highest education level: Not on file   Occupational History    Not on file   Social Needs    Financial resource strain: Not on file    Food insecurity     Worry: Not on file     Inability: Not on file    Transportation needs     Medical: Not on file     Non-medical: Not on file   Tobacco Use    Smoking status: Never Smoker    Smokeless tobacco: Never Used   Substance and Sexual Activity    Alcohol use: Yes     Comment: rarely    Drug use: No    Sexual activity: Not on file   Lifestyle    Physical activity     Days per week: Not on file     Minutes per session: Not on file    Stress: Not on file   Relationships    Social connections     Talks on phone: Not on file     Gets together: Not on file     Attends Yazidism service: Not on file     Active member of club or organization: Not on file     Attends meetings of clubs or organizations: Not on file     Relationship status: Not on file    Intimate partner violence     Fear of current or ex partner: Not on file     Emotionally abused: Not on file     Physically abused: Not on file     Forced sexual activity: Not on file   Other Topics Concern    Not on file   Social History Narrative    Not on file         ALLERGIES: Patient has no known allergies. Review of Systems   Constitutional: Negative for chills and fever. HENT: Negative for rhinorrhea and sore throat. Respiratory: Positive for shortness of breath. Negative for cough. Cardiovascular: Positive for chest pain. Gastrointestinal: Negative for abdominal pain, diarrhea, nausea and vomiting. Genitourinary: Negative for dysuria and urgency. Musculoskeletal: Negative for arthralgias and back pain. Skin: Negative for rash. Neurological: Negative for dizziness, weakness and light-headedness. Vitals:    01/05/21 1935   BP: 139/87   Pulse: (!) 110   Resp: 18   Temp: 98 °F (36.7 °C)   SpO2: 98%   Weight: 56.5 kg (124 lb 9 oz)   Height: 5' 3\" (1.6 m)            Physical Exam     Vital signs reviewed. Nursing notes reviewed.     Const:  No acute distress, well developed, well nourished  Head:  Atraumatic, normocephalic  Eyes:  PERRL, conjunctiva normal, no scleral icterus  Neck:  Supple, trachea midline  Cardiovascular: Regular rate   resp:  No resp distress, no increased work of breathing  abd:  Soft, non-tender, non-distended, no rebound, no guarding, no CVA tenderness  MSK:  No pedal edema, normal ROM  Neuro:  Alert and oriented x3, no cranial nerve defect  Skin:  Warm, dry, intact  Psych: normal mood and affect, behavior is normal, judgement and thought content is normal          MDM  Number of Diagnoses or Management Options     Amount and/or Complexity of Data Reviewed  Clinical lab tests: ordered and reviewed  Tests in the radiology section of CPT®: ordered and reviewed  Review and summarize past medical records: yes    Patient Progress  Patient progress: stable         Ms. Annel Pettit is a 39yo female who presents to the ER with complaints of concern for possible pneumothorax. CT shows bulla of lungs and a large amount of free intraperitoneal air. Pt. Denies abd pain. No WBC. I spoke with Dr. Landon Hinds, with general surgery. She will admit the patient for further care. She will look at the images and will consider ordering abx. Pt. To be admitted for further care.       Procedures

## 2021-09-21 ENCOUNTER — HOSPITAL ENCOUNTER (EMERGENCY)
Age: 46
Discharge: HOME | End: 2021-09-21
Attending: EMERGENCY MEDICINE
Payer: COMMERCIAL

## 2021-09-21 ENCOUNTER — APPOINTMENT (OUTPATIENT)
Dept: GENERAL RADIOLOGY | Age: 46
End: 2021-09-21
Attending: EMERGENCY MEDICINE
Payer: COMMERCIAL

## 2021-09-21 VITALS
OXYGEN SATURATION: 97 % | HEIGHT: 63 IN | RESPIRATION RATE: 18 BRPM | SYSTOLIC BLOOD PRESSURE: 162 MMHG | BODY MASS INDEX: 22.07 KG/M2 | HEART RATE: 101 BPM | WEIGHT: 124.56 LBS | DIASTOLIC BLOOD PRESSURE: 95 MMHG | TEMPERATURE: 97.7 F

## 2021-09-21 DIAGNOSIS — M79.674 PAIN OF TOE OF RIGHT FOOT: Primary | ICD-10-CM

## 2021-09-21 PROCEDURE — 73660 X-RAY EXAM OF TOE(S): CPT

## 2021-09-21 PROCEDURE — 99281 EMR DPT VST MAYX REQ PHY/QHP: CPT

## 2021-09-21 NOTE — ED TRIAGE NOTES
Pt reports this morning she stubbed her right 5th toe on the bed post. Now reports right 5th toe pain, swelling, and bruising. Hx of DM.

## 2021-09-21 NOTE — ED PROVIDER NOTES
Toe Pain   This is a new problem. The current episode started 6 to 12 hours ago. The problem occurs constantly. The problem has not changed since onset. The pain is present in the right toes (right fifth toe). The pain is moderate. Pertinent negatives include no numbness, full range of motion, no stiffness, no tingling, no itching, no back pain and no neck pain. She has tried rest for the symptoms. There has been a history of trauma (stubbed toe on bedpost this morning).         Past Medical History:   Diagnosis Date    Anemia     Anemia     Diabetes ([address]     type 1    Dysmenorrhea     Endometriosis     GERD (gastroesophageal reflux disease)     Mitral valve prolapse     Spontaneous pneumothorax 3/2013 and 8/2013    recurrent right--total of 4 episodes, 2 required surgeries, above dates       Past Surgical History:   Procedure Laterality Date    HX DILATION AND CURETTAGE  09/20/2018    HX OTHER SURGICAL  2009    ex lap, abdominal for endometriosis    HX OTHER SURGICAL  3/21/2013    Bronch, RVATS, apical bleb rsxn,parietal pleurectomy    HX OTHER SURGICAL  8/21/2013    Bronchoscopy, RVATS and talc pleurodesis         Family History:   Problem Relation Age of Onset    Hypertension Mother     Diabetes Father     Heart Disease Father     Anemia Father        Social History     Socioeconomic History    Marital status:      Spouse name: Not on file    Number of children: Not on file    Years of education: Not on file    Highest education level: Not on file   Occupational History    Not on file   Tobacco Use    Smoking status: Never Smoker    Smokeless tobacco: Never Used   Substance and Sexual Activity    Alcohol use: Yes     Comment: rarely    Drug use: No    Sexual activity: Not on file   Other Topics Concern    Not on file   Social History Narrative    Not on file     Social Determinants of Health     Financial Resource Strain:     Difficulty of Paying Living Expenses:    Food Insecurity:     Worried About Running Out of Food in the Last Year:     [address] in the Last Year:    Transportation Needs:     Lack of Transportation (Medical):  Lack of Transportation (Non-Medical):    Physical Activity:     Days of Exercise per Week:     Minutes of Exercise per Session:    Stress:     Feeling of Stress :    Social Connections:     Frequency of Communication with Friends and Family:     Frequency of Social Gatherings with Friends and Family:     Attends Mandaeism Services:     Active Member of Clubs or Organizations:     Attends Club or Organization Meetings:     Marital Status:    Intimate Partner Violence:     Fear of Current or Ex-Partner:     Emotionally Abused:     Physically Abused:     Sexually Abused: ALLERGIES: Patient has no known allergies. Review of Systems   Constitutional: Negative for chills and fever. HENT: Negative for ear pain and sore throat. Eyes: Negative for visual disturbance. Respiratory: Negative for cough and shortness of breath. Cardiovascular: Negative for chest pain. Gastrointestinal: Negative for abdominal pain. Genitourinary: Negative for flank pain. Musculoskeletal: Positive for arthralgias. Negative for back pain, neck pain and stiffness. Skin: Negative for color change and itching. Neurological: Negative for dizziness, tingling, numbness and headaches. Psychiatric/Behavioral: Negative for confusion. Vitals:    09/21/21 1852   BP: (!) 162/95   Pulse: (!) 101   Resp: 18   Temp: 97.7 °F (36.5 °C)   SpO2: 97%   Weight: 56.5 kg (124 lb 9 oz)   Height: 5' 3\" (1.6 m)            Physical Exam  Vitals and nursing note reviewed. Constitutional:       General: She is not in acute distress. Appearance: Normal appearance. She is not ill-appearing. HENT:      Head: Normocephalic and atraumatic. Mouth/Throat:      Pharynx: Oropharynx is clear.    Eyes:      Extraocular Movements: Extraocular movements intact. Conjunctiva/sclera: Conjunctivae normal.   Cardiovascular:      Rate and Rhythm: Normal rate and regular rhythm. Pulmonary:      Effort: Pulmonary effort is normal.      Breath sounds: Normal breath sounds. Abdominal:      Palpations: Abdomen is soft. Tenderness: There is no abdominal tenderness. Musculoskeletal:         General: Normal range of motion. Cervical back: No rigidity. Right lower leg: Normal.      Left lower leg: Normal.      Right ankle: Normal.      Left ankle: Normal.      Right foot: Normal range of motion. Bony tenderness (to right 5th toe with visible bruising, gross sensation and ROM intact, no laceration) present. Left foot: Normal. Normal range of motion. Feet:      Right foot:      Skin integrity: Skin integrity normal.      Left foot:      Skin integrity: Skin integrity normal.   Skin:     General: Skin is warm and dry. Neurological:      General: No focal deficit present. Mental Status: She is alert and oriented to person, place, and time. Psychiatric:         Mood and Affect: Mood normal.          MDM  Number of Diagnoses or Management Options  Pain of toe of right foot  Diagnosis management comments: Patient is alert, vitals stable, ambulatory. Right toe blunt injury this morning, visible bruising, no deformity. No neurovascular or motor deficits. X-ray without evidence of fracture. Patient informed of findings and is discharged with instructions for RICE and follow-up with her PCP as needed. ED Course as of Sep 21 2337   Tue Sep 21, 2021   2023    IMPRESSION  No acute abnormality. XR 4TH TOE RIGHT [EP]      ED Course User Index  [EP] David Valenzuela PA     11:38 PM  Pt has been reevaluated. There are no new complaints, changes, or physical findings at this time. All results have been reviewed with patient and/or family. Medications have been reviewed w/ pt and/or family. Pt and/or family's questions have been answered.  Pt and/or family expressed good understanding of the dx/tx/rx and is in agreement with plan of care. Pt instructed and agreed to f/u w/ PCP and to return to ED upon further deterioration. Pt is ready for discharge. IMPRESSION:  1. Pain of toe of right foot        PLAN:  1. Discharge Medication List as of 9/21/2021  8:38 PM        2.    Follow-up Information     Follow up With Specialties Details Why Contact Aubree Burk MD Family Medicine Go to  As needed [address] [place] [address]  [phone]              Return to ED if worse     Procedures

## 2021-09-22 NOTE — ED NOTES
7:44 PM  I have evaluated the patient as the Provider in Triage. I have reviewed Her vital signs and the triage nurse assessment. I have talked with the patient and any available family and advised that I am the provider in triage and have ordered the appropriate study to initiate their work up based on the clinical presentation during my assessment. I have advised that the patient will be accommodated in the Main ED as soon as possible. I have also requested to contact the triage nurse or myself immediately if the patient experiences any changes in their condition during this brief waiting period. Alberto Price NP    This is a 68-year-old female with recurrent pneumothoraces and insulin-dependent diabetes who presents the ER today for evaluation of mild dyspnea on exertion for approximately 4 days in duration which is also associated with low-grade discomfort around her right scapula, which is consistent with her prior pneumothoraces.   Patient denies chest pain, fever/chills, cough, edema/extremity swelling, dizziness, syncope COPD Education follow up call:  Left message with call back number should pt have questions or concerns and COPD Action Plan reminder.  Aric Khan, RT, TTS, Chronic Pulmonary Disease Specialist

## 2021-12-06 ENCOUNTER — HOSPITAL ENCOUNTER (EMERGENCY)
Age: 46
Discharge: HOME | End: 2021-12-06
Attending: STUDENT IN AN ORGANIZED HEALTH CARE EDUCATION/TRAINING PROGRAM
Payer: COMMERCIAL

## 2021-12-06 VITALS
OXYGEN SATURATION: 99 % | HEART RATE: 90 BPM | TEMPERATURE: 97.7 F | RESPIRATION RATE: 16 BRPM | DIASTOLIC BLOOD PRESSURE: 80 MMHG | SYSTOLIC BLOOD PRESSURE: 140 MMHG | BODY MASS INDEX: 23 KG/M2 | HEIGHT: 62 IN | WEIGHT: 125 LBS

## 2021-12-06 DIAGNOSIS — N30.00 ACUTE CYSTITIS WITHOUT HEMATURIA: ICD-10-CM

## 2021-12-06 DIAGNOSIS — R50.9 FEVER, UNSPECIFIED FEVER CAUSE: Primary | ICD-10-CM

## 2021-12-06 DIAGNOSIS — E10.9 TYPE 1 DIABETES MELLITUS WITHOUT COMPLICATION (HCC): ICD-10-CM

## 2021-12-06 DIAGNOSIS — Z20.822 CLOSE EXPOSURE TO COVID-19 VIRUS: ICD-10-CM

## 2021-12-06 LAB
ALBUMIN SERPL-MCNC: 3.5 G/DL (ref 3.5–5)
ALBUMIN/GLOB SERPL: 0.7 {RATIO} (ref 1.1–2.2)
ALP SERPL-CCNC: 116 U/L (ref 45–117)
ALT SERPL-CCNC: 19 U/L (ref 12–78)
ANION GAP SERPL CALC-SCNC: 4 MMOL/L (ref 5–15)
APPEARANCE UR: ABNORMAL
AST SERPL-CCNC: 12 U/L (ref 15–37)
BACTERIA URNS QL MICRO: ABNORMAL /HPF
BASOPHILS # BLD: 0 K/UL (ref 0–0.1)
BASOPHILS NFR BLD: 0 % (ref 0–1)
BILIRUB SERPL-MCNC: 0.4 MG/DL (ref 0.2–1)
BILIRUB UR QL: NEGATIVE
BUN SERPL-MCNC: 11 MG/DL (ref 6–20)
BUN/CREAT SERPL: 16 (ref 12–20)
CALCIUM SERPL-MCNC: 9.1 MG/DL (ref 8.5–10.1)
CHLORIDE SERPL-SCNC: 104 MMOL/L (ref 97–108)
CO2 SERPL-SCNC: 28 MMOL/L (ref 21–32)
COLOR UR: ABNORMAL
CREAT SERPL-MCNC: 0.7 MG/DL (ref 0.55–1.02)
DIFFERENTIAL METHOD BLD: ABNORMAL
EOSINOPHIL # BLD: 0.1 K/UL (ref 0–0.4)
EOSINOPHIL NFR BLD: 1 % (ref 0–7)
EPITH CASTS URNS QL MICRO: ABNORMAL /LPF
ERYTHROCYTE [DISTWIDTH] IN BLOOD BY AUTOMATED COUNT: 12.7 % (ref 11.5–14.5)
FLUAV AG NPH QL IA: NEGATIVE
FLUBV AG NOSE QL IA: NEGATIVE
GLOBULIN SER CALC-MCNC: 5.3 G/DL (ref 2–4)
GLUCOSE SERPL-MCNC: 239 MG/DL (ref 65–100)
GLUCOSE UR STRIP.AUTO-MCNC: >1000 MG/DL
HCT VFR BLD AUTO: 42.6 % (ref 35–47)
HGB BLD-MCNC: 13.3 G/DL (ref 11.5–16)
HGB UR QL STRIP: NEGATIVE
IMM GRANULOCYTES # BLD AUTO: 0.1 K/UL (ref 0–0.04)
IMM GRANULOCYTES NFR BLD AUTO: 1 % (ref 0–0.5)
KETONES UR QL STRIP.AUTO: 15 MG/DL
LEUKOCYTE ESTERASE UR QL STRIP.AUTO: NEGATIVE
LYMPHOCYTES # BLD: 1.2 K/UL (ref 0.8–3.5)
LYMPHOCYTES NFR BLD: 12 % (ref 12–49)
MCH RBC QN AUTO: 26.7 PG (ref 26–34)
MCHC RBC AUTO-ENTMCNC: 31.2 G/DL (ref 30–36.5)
MCV RBC AUTO: 85.4 FL (ref 80–99)
MONOCYTES # BLD: 0.9 K/UL (ref 0–1)
MONOCYTES NFR BLD: 9 % (ref 5–13)
NEUTS SEG # BLD: 7.9 K/UL (ref 1.8–8)
NEUTS SEG NFR BLD: 77 % (ref 32–75)
NITRITE UR QL STRIP.AUTO: POSITIVE
NRBC # BLD: 0 K/UL (ref 0–0.01)
NRBC BLD-RTO: 0 PER 100 WBC
PH UR STRIP: 6.5 [PH] (ref 5–8)
PLATELET # BLD AUTO: 293 K/UL (ref 150–400)
PMV BLD AUTO: 10.9 FL (ref 8.9–12.9)
POTASSIUM SERPL-SCNC: 3.6 MMOL/L (ref 3.5–5.1)
PROT SERPL-MCNC: 8.8 G/DL (ref 6.4–8.2)
PROT UR STRIP-MCNC: NEGATIVE MG/DL
RBC # BLD AUTO: 4.99 M/UL (ref 3.8–5.2)
RBC #/AREA URNS HPF: ABNORMAL /HPF (ref 0–5)
SARS-COV-2, COV2: NORMAL
SODIUM SERPL-SCNC: 136 MMOL/L (ref 136–145)
SP GR UR REFRACTOMETRY: 1.02 (ref 1–1.03)
UR CULT HOLD, URHOLD: NORMAL
UROBILINOGEN UR QL STRIP.AUTO: 1 EU/DL (ref 0.2–1)
WBC # BLD AUTO: 10.2 K/UL (ref 3.6–11)
WBC URNS QL MICRO: ABNORMAL /HPF (ref 0–4)

## 2021-12-06 PROCEDURE — 87186 SC STD MICRODIL/AGAR DIL: CPT

## 2021-12-06 PROCEDURE — 81001 URINALYSIS AUTO W/SCOPE: CPT

## 2021-12-06 PROCEDURE — 99282 EMERGENCY DEPT VISIT SF MDM: CPT

## 2021-12-06 PROCEDURE — 87804 INFLUENZA ASSAY W/OPTIC: CPT

## 2021-12-06 PROCEDURE — 80053 COMPREHEN METABOLIC PANEL: CPT

## 2021-12-06 PROCEDURE — U0005 INFEC AGEN DETEC AMPLI PROBE: HCPCS

## 2021-12-06 PROCEDURE — 74011250636 HC RX REV CODE- 250/636: Performed by: STUDENT IN AN ORGANIZED HEALTH CARE EDUCATION/TRAINING PROGRAM

## 2021-12-06 PROCEDURE — 87077 CULTURE AEROBIC IDENTIFY: CPT

## 2021-12-06 PROCEDURE — 87086 URINE CULTURE/COLONY COUNT: CPT

## 2021-12-06 PROCEDURE — 85025 COMPLETE CBC W/AUTO DIFF WBC: CPT

## 2021-12-06 PROCEDURE — 36415 COLL VENOUS BLD VENIPUNCTURE: CPT

## 2021-12-06 RX ORDER — CEPHALEXIN 500 MG/1
500 CAPSULE ORAL 4 TIMES DAILY
Qty: 28 CAPSULE | Refills: 0 | Status: SHIPPED | OUTPATIENT
Start: 2021-12-06 | End: 2021-12-13

## 2021-12-06 RX ADMIN — SODIUM CHLORIDE 1000 ML: 9 INJECTION, SOLUTION INTRAVENOUS at 15:49

## 2021-12-06 NOTE — ED PROVIDER NOTES
Patient is a 55year old female with history of type 1 diabetes, anemia, endometriosis, GERD, MVP who presents to ED c/o fever and myalgias which started 2 days prior. Reports fever is constant, has been alternating tylenol and ibuprofen. States she overall just does not feel well. Patient reports she is a , so she is exposed to a lot of people. She received both doses of COVID-19 vaccine as well as booster (11/19) and influenza vaccine on 12/1. Patient denies any chest pain, shortness of breath, cough, abdominal pain, urinary sx, N/V/D, sore throat, congestion, ear pain, and headache.  Last took tylenol prior to arrival.            Past Medical History:   Diagnosis Date    Anemia     Anemia     Diabetes ([address]     type 1    Dysmenorrhea     Endometriosis     GERD (gastroesophageal reflux disease)     Mitral valve prolapse     Spontaneous pneumothorax 3/2013 and 8/2013    recurrent right--total of 4 episodes, 2 required surgeries, above dates       Past Surgical History:   Procedure Laterality Date    HX DILATION AND CURETTAGE  09/20/2018    HX OTHER SURGICAL  2009    ex lap, abdominal for endometriosis    HX OTHER SURGICAL  3/21/2013    Bronch, RVATS, apical bleb rsxn,parietal pleurectomy    HX OTHER SURGICAL  8/21/2013    Bronchoscopy, RVATS and talc pleurodesis         Family History:   Problem Relation Age of Onset    Hypertension Mother     Diabetes Father     Heart Disease Father     Anemia Father        Social History     Socioeconomic History    Marital status:      Spouse name: Not on file    Number of children: Not on file    Years of education: Not on file    Highest education level: Not on file   Occupational History    Not on file   Tobacco Use    Smoking status: Never Smoker    Smokeless tobacco: Never Used   Substance and Sexual Activity    Alcohol use: Yes     Comment: rarely    Drug use: No    Sexual activity: Not on file   Other Topics Concern    Not on file Social History Narrative    Not on file     Social Determinants of Health     Financial Resource Strain:     Difficulty of Paying Living Expenses: Not on file   Food Insecurity:     Worried About Running Out of Food in the Last Year: Not on file    Trent of Food in the Last Year: Not on file   Transportation Needs:     Lack of Transportation (Medical): Not on file    Lack of Transportation (Non-Medical): Not on file   Physical Activity:     Days of Exercise per Week: Not on file    Minutes of Exercise per Session: Not on file   Stress:     Feeling of Stress : Not on file   Social Connections:     Frequency of Communication with Friends and Family: Not on file    Frequency of Social Gatherings with Friends and Family: Not on file    Attends Gnosticist Services: Not on file    Active Member of [address] [place] [address] Thounds or Organizations: Not on file    Attends Club or Organization Meetings: Not on file    Marital Status: Not on file   Intimate Partner Violence:     Fear of Current or Ex-Partner: Not on file    Emotionally Abused: Not on file    Physically Abused: Not on file    Sexually Abused: Not on file   Housing Stability:     Unable to Pay for Housing in the Last Year: Not on file    Number of [place] in the Last Year: Not on file    Unstable Housing in the Last Year: Not on file         ALLERGIES: Patient has no known allergies. Review of Systems   Constitutional: Positive for chills and fever. Negative for activity change and appetite change. HENT: Negative for congestion and sore throat. Eyes: Negative for pain and visual disturbance. Respiratory: Negative for cough and shortness of breath. Cardiovascular: Negative for chest pain, palpitations and leg swelling. Gastrointestinal: Negative for abdominal distention, abdominal pain, constipation, diarrhea, nausea and vomiting. Genitourinary: Negative for decreased urine volume, dysuria, flank pain, frequency and urgency. Musculoskeletal: Positive for myalgias. Negative for back pain and neck pain. Skin: Negative for rash and wound. Allergic/Immunologic: Negative for immunocompromised state. Neurological: Negative for dizziness, syncope, weakness, light-headedness, numbness and headaches. Psychiatric/Behavioral: Negative for confusion. All other systems reviewed and are negative. Vitals:            Physical Exam  Vitals and nursing note reviewed. Constitutional:       General: She is not in acute distress. Appearance: Normal appearance. She is well-developed. She is not toxic-appearing. HENT:      Head: Normocephalic and atraumatic. Nose: Nose normal.      Mouth/Throat:      Mouth: Mucous membranes are moist.   Eyes:      General: Lids are normal.      Extraocular Movements: Extraocular movements intact. Conjunctiva/sclera: Conjunctivae normal.   Cardiovascular:      Rate and Rhythm: Regular rhythm. Tachycardia present. Pulses: Normal pulses. Heart sounds: Normal heart sounds, S1 normal and S2 normal.   Pulmonary:      Effort: Pulmonary effort is normal. No accessory muscle usage. Breath sounds: Normal breath sounds. Abdominal:      Palpations: Abdomen is soft. Musculoskeletal:         General: Normal range of motion. Cervical back: Normal range of motion and neck supple. Skin:     General: Skin is warm and dry. Capillary Refill: Capillary refill takes less than 2 seconds. Neurological:      General: No focal deficit present. Mental Status: She is alert and oriented to person, place, and time. Mental status is at baseline. Psychiatric:         Attention and Perception: Attention normal.         Mood and Affect: Mood and affect normal.         Speech: Speech normal.         Behavior: Behavior is cooperative. Thought Content:  Thought content normal.         Cognition and Memory: Cognition normal.         Judgment: Judgment normal.          MDM  Number of Diagnoses or Management Options  Close exposure to COVID-19 virus  Fever, unspecified fever cause  Type 1 diabetes mellitus without complication ([facility] [address]  Diagnosis management comments: Patient presents with fever and myalgias. Has not been feeling well past 2 days. Received 3 doses of COVID-19 vaccine. Influenza vaccine last week. She works as a . She is tachycardic, but otherwise exam is unremarkable. Denies focal pain. Labs reassuring. Advised follow-up with PCP and return to ER warnings discussed in detail.         Amount and/or Complexity of Data Reviewed  Clinical lab tests: ordered  Discuss the patient with other providers: yes (Dr. Lenin Chavez, ED attending )           Procedures

## 2021-12-06 NOTE — DISCHARGE INSTRUCTIONS
Your labs today were reassuring. There were no acute abnormalities noted. Alternate Tylenol and ibuprofen as needed for fever and body aches. Please schedule an appointment to be seen by her primary care physician. If you develop new or worsening symptoms please return to the ER.

## 2021-12-06 NOTE — ED TRIAGE NOTES
Pt to ED for fever and body aches since Saturday. Pt vaccinated with booster 11/19/21 and flu. Denies chest pain, sob, cough or abd pain. Last dose of tylenol 2 hours ago. Pt type 1 diabetic and states sugars have been in the 200's.

## 2021-12-07 ENCOUNTER — PATIENT OUTREACH (OUTPATIENT)
Dept: CASE MANAGEMENT | Age: 46
End: 2021-12-07

## 2021-12-07 NOTE — PROGRESS NOTES
Patient contacted regarding COVID-19 risk. Discussed COVID-19 related testing which was available at this time. Test results were negative. Patient informed of results, if available? yes. Ambulatory Care Manager contacted the patient by telephone to perform post discharge assessment. Call within 2 business days of discharge: Yes Verified name and  with patient as identifiers. Provided introduction to self, and explanation of the CTN/ACM role, and reason for call due to risk factors for infection and/or exposure to COVID-19. Symptoms reviewed with patient who verbalized the following symptoms: no new symptoms and no worsening symptoms      Due to no new or worsening symptoms encounter was not routed to provider for escalation. Discussed follow-up appointments. If no appointment was previously scheduled, appointment scheduling offered:  no. [facility] follow up appointment(s): No future appointments. Non-[facility] follow up appointment(s): patient will schedule PCP follow-up appointment if needed     Advance Care Planning:   Does patient have an Advance Directive: not on file. Educated patient about risk for severe COVID-19 due to risk factors according to CDC guidelines. ACM reviewed discharge instructions, medical action plan and red flag symptoms with the patient who verbalized understanding. Discussed COVID vaccination status: no. Education provided on COVID-19 vaccination as appropriate. Discussed exposure protocols and quarantine with CDC Guidelines. Patient was given an opportunity to verbalize any questions and concerns and agrees to contact ACM or health care provider for questions related to their healthcare. Reviewed and educated patient on any new and changed medications related to discharge diagnosis     Was patient discharged with a pulse oximeter? no Discussed and confirmed pulse oximeter discharge instructions and when to notify provider or seek emergency care.     ACM provided contact information. No further follow-up call identified based on severity of symptoms and risk factors. No

## 2021-12-08 LAB
SARS-COV-2, XPLCVT: NOT DETECTED
SOURCE, COVRS: NORMAL

## 2021-12-09 ENCOUNTER — HOSPITAL ENCOUNTER (EMERGENCY)
Age: 46
Discharge: HOME | End: 2021-12-09
Attending: FAMILY MEDICINE
Payer: COMMERCIAL

## 2021-12-09 VITALS
HEART RATE: 102 BPM | DIASTOLIC BLOOD PRESSURE: 82 MMHG | HEIGHT: 62 IN | OXYGEN SATURATION: 98 % | RESPIRATION RATE: 18 BRPM | TEMPERATURE: 98.5 F | WEIGHT: 125 LBS | SYSTOLIC BLOOD PRESSURE: 139 MMHG | BODY MASS INDEX: 23 KG/M2

## 2021-12-09 DIAGNOSIS — B34.9 VIRAL SYNDROME: Primary | ICD-10-CM

## 2021-12-09 LAB
BACTERIA SPEC CULT: ABNORMAL
CC UR VC: ABNORMAL
SERVICE CMNT-IMP: ABNORMAL

## 2021-12-09 PROCEDURE — 99282 EMERGENCY DEPT VISIT SF MDM: CPT

## 2021-12-09 NOTE — ED PROVIDER NOTES
EMERGENCY DEPARTMENT HISTORY AND PHYSICAL EXAM      Date: 12/9/2021  Patient Name: Beena Mancini    History of Presenting Illness     Chief complaint: Body aches, chills, fevers    History Provided By:     HPI: Beena Mancini, is an extremely pleasant 55 y.o. female presenting to the ED with a chief complaint of body aches, chills and fevers. Patient states her symptoms have been waxing and waning for several days. She endorses recent negative influenza and COVID-19 testing. She states she continues to have muscle aches. Feeling more tired than usual.  No cough, chest pain or shortness of breath. No nausea vomiting or diarrhea. She states she is a type I diabetic and has been watching her sugar closely. She states her sugar has remained in the low 200s. No other concerns. There are no other complaints, changes, or physical findings at this time. PCP: Sandy Lagunas MD    No current facility-administered medications on file prior to encounter. Current Outpatient Medications on File Prior to Encounter   Medication Sig Dispense Refill    cephALEXin (Keflex) 500 mg capsule Take 1 Capsule by mouth four (4) times daily for 7 days. 28 Capsule 0    buPROPion (WELLBUTRIN) 75 mg tablet Take 75 mg by mouth two (2) times a day.  multivit-minerals/folic acid (WOMENS DAILY GUMMIES PO) Take 1 g by mouth daily.  insulin degludec (TRESIBA FLEXTOUCH U-100) 100 unit/mL (3 mL) inpn 32 Units by SubCUTAneous route nightly.  insulin aspart (NOVOLOG FLEXPEN) 100 unit/mL inpn by SubCUTAneous route Before breakfast, lunch, and dinner. Sliding scale      omeprazole (PRILOSEC) 20 mg capsule Take 20 mg by mouth daily as needed.          Past History     Past Medical History:  Past Medical History:   Diagnosis Date    Anemia     Anemia     Diabetes ([address]     type 1    Dysmenorrhea     Endometriosis     GERD (gastroesophageal reflux disease)     Mitral valve prolapse     Spontaneous pneumothorax 3/2013 and 8/2013    recurrent right--total of 4 episodes, 2 required surgeries, above dates       Past Surgical History:  Past Surgical History:   Procedure Laterality Date    HX DILATION AND CURETTAGE  09/20/2018    HX OTHER SURGICAL  2009    ex lap, abdominal for endometriosis    HX OTHER SURGICAL  3/21/2013    Bronch, RVATS, apical bleb rsxn,parietal pleurectomy    HX OTHER SURGICAL  8/21/2013    Bronchoscopy, RVATS and talc pleurodesis       Family History:  Family History   Problem Relation Age of Onset    Hypertension Mother     Diabetes Father     Heart Disease Father     Anemia Father        Social History:  Social History     Tobacco Use    Smoking status: Never Smoker    Smokeless tobacco: Never Used   Substance Use Topics    Alcohol use: Yes     Comment: rarely    Drug use: No       Allergies:  No Known Allergies      Review of Systems     Review of Systems   Constitutional: Positive for chills, fatigue and fever. Negative for activity change and appetite change. HENT: Negative for congestion and sore throat. Eyes: Negative for photophobia and visual disturbance. Respiratory: Negative for cough, shortness of breath and wheezing. Cardiovascular: Negative for chest pain, palpitations and leg swelling. Gastrointestinal: Negative for abdominal pain, diarrhea, nausea and vomiting. Endocrine: Negative for cold intolerance and heat intolerance. Musculoskeletal: Positive for myalgias. Negative for gait problem and joint swelling. Skin: Negative for color change and rash. Neurological: Negative for dizziness and headaches. Physical Exam     Physical Exam  Constitutional:       Appearance: She is well-developed. HENT:      Head: Normocephalic and atraumatic. Mouth/Throat:      Mouth: Mucous membranes are moist.      Pharynx: Oropharynx is clear.    Eyes:      Conjunctiva/sclera: Conjunctivae normal.      Pupils: Pupils are equal, round, and reactive to light. Cardiovascular:      Rate and Rhythm: Normal rate and regular rhythm. Heart sounds: No murmur heard. Pulmonary:      Effort: No respiratory distress. Breath sounds: No stridor. No wheezing, rhonchi or rales. Abdominal:      General: There is no distension. Tenderness: There is no abdominal tenderness. There is no rebound. Musculoskeletal:      Cervical back: Normal range of motion and neck supple. No rigidity. Skin:     General: Skin is warm and dry. Neurological:      General: No focal deficit present. Mental Status: She is alert and oriented to person, place, and time. Psychiatric:         Mood and Affect: Mood normal.         Behavior: Behavior normal.         Lab and Diagnostic Study Results     Labs -   No results found for this or any previous visit (from the past 12 hour(s)). Radiologic Studies -   @lastxrresult@  CT Results  (Last 48 hours)    None        CXR Results  (Last 48 hours)    None            Medical Decision Making   - I am the first provider for this patient. - I reviewed the vital signs, available nursing notes, past medical history, past surgical history, family history and social history. - Initial assessment performed. The patients presenting problems have been discussed, and they are in agreement with the care plan formulated and outlined with them. I have encouraged them to ask questions as they arise throughout their visit. Vital Signs-Reviewed the patient's vital signs. Patient Vitals for the past 12 hrs:   Temp Pulse Resp BP SpO2   12/09/21 1027 98.5 °F (36.9 °C) (!) 102 18 139/82 98 %         ED Course/ Provider Notes (Medical Decision Making):     Patient presented to the emergency department with a chief complaint of fevers, chills and body aches. On examination the patient is nontoxic and well-appearing. Vitals were reviewed per above.   Initially mildly tachycardic 102 bpm however this resolves into the 80s when she is in the room. She is afebrile. No findings of bacterial infection. Suspect likely self-limiting viral syndrome. We discussed abortive measures for her symptoms. Discharged home in stable condition. Imer Adler was given a thorough list of signs and symptoms that would warrant an immediate return to the emergency department. Otherwise Imer Adler will follow up with PCP. Procedures   Medical Decision Makingedical Decision Making  Performed by: Lois Rowan DO  Procedures  None       Disposition   Disposition:     Home    All of the diagnostic tests were reviewed and questions answered. Diagnosis, care plan and treatment options were discussed. The patient understands the instructions and will follow up as directed. The patients results have been reviewed with them. They have been counseled regarding their diagnosis. The patient verbally convey understanding and agreement of the signs, symptoms, diagnosis, treatment and prognosis and additionally agrees to follow up as recommended with their PCP in 24 - 48 hours. They also agree with the care-plan and convey that all of their questions have been answered. I have also put together some discharge instructions for them that include: 1) educational information regarding their diagnosis, 2) how to care for their diagnosis at home, as well a 3) list of reasons why they would want to return to the ED prior to their follow-up appointment, should their condition change. DISCHARGE PLAN:    1. Current Discharge Medication List      CONTINUE these medications which have NOT CHANGED    Details   cephALEXin (Keflex) 500 mg capsule Take 1 Capsule by mouth four (4) times daily for 7 days. Qty: 28 Capsule, Refills: 0      buPROPion (WELLBUTRIN) 75 mg tablet Take 75 mg by mouth two (2) times a day. multivit-minerals/folic acid (WOMENS DAILY GUMMIES PO) Take 1 g by mouth daily.       insulin degludec (Arthur Alfaro U-100) 100 unit/mL (3 mL) inpn 32 Units by SubCUTAneous route nightly. insulin aspart (NOVOLOG FLEXPEN) 100 unit/mL inpn by SubCUTAneous route Before breakfast, lunch, and dinner. Sliding scale      omeprazole (PRILOSEC) 20 mg capsule Take 20 mg by mouth daily as needed. 2.   Follow-up Information     Follow up With Specialties Details Why Contact Info    Eunice Wong MD [address]  [phone]              3.  Return to ED if worse       4. Current Discharge Medication List            Diagnosis     Clinical Impression:    1. Viral syndrome        Attestations:    Nadine Chadwick, DO    Please note that this dictation was completed with [employer], the computer voice recognition software. Quite often unanticipated grammatical, syntax, homophones, and other interpretive errors are inadvertently transcribed by the computer software. Please disregard these errors. Please excuse any errors that have escaped final proofreading. Thank you.

## 2021-12-09 NOTE — Clinical Note
[address] [facility] EMERGENCY DEPARTMENT  [address] [id number] [phone]    Work/School Note    Date: 12/9/2021    To Whom It May concern:    Imer Adler was seen and treated today in the emergency room by the following provider(s):  Attending Provider: Mirta Palma DO. Imer Adler is excused from work/school on 12/9/2021 through 12/11/2021. She is medically clear to return to work/school on 12/12/2021.          Sincerely,          Lois Rowan DO

## 2021-12-09 NOTE — ED TRIAGE NOTES
Seen Monday 12/6/2021, flu, covid all negative  treated for uti-on Keflex since, continues with bodyaches./fever/h/a

## 2021-12-17 NOTE — ED TRIAGE NOTES
Pt reports SOB onset 4 days ago. +right sided chest and upper back pain. Worse with exertion. Hx of spontaneous pneumothorax x 4, most recently in 2013. Saw pulmonology at that time but has had no issues since then. Says current sx feel similar. Alert and oriented to person, place and time

## 2022-03-16 ENCOUNTER — APPOINTMENT (OUTPATIENT)
Dept: GENERAL RADIOLOGY | Age: 47
End: 2022-03-16
Attending: STUDENT IN AN ORGANIZED HEALTH CARE EDUCATION/TRAINING PROGRAM
Payer: COMMERCIAL

## 2022-03-16 ENCOUNTER — HOSPITAL ENCOUNTER (EMERGENCY)
Age: 47
Discharge: HOME | End: 2022-03-16
Attending: STUDENT IN AN ORGANIZED HEALTH CARE EDUCATION/TRAINING PROGRAM
Payer: COMMERCIAL

## 2022-03-16 VITALS
DIASTOLIC BLOOD PRESSURE: 85 MMHG | TEMPERATURE: 97.8 F | HEART RATE: 89 BPM | BODY MASS INDEX: 23 KG/M2 | SYSTOLIC BLOOD PRESSURE: 160 MMHG | HEIGHT: 62 IN | RESPIRATION RATE: 18 BRPM | WEIGHT: 125 LBS | OXYGEN SATURATION: 100 %

## 2022-03-16 DIAGNOSIS — Z87.09 HISTORY OF PNEUMOTHORAX: ICD-10-CM

## 2022-03-16 DIAGNOSIS — J92.9 PLEURAL THICKENING: Primary | ICD-10-CM

## 2022-03-16 PROCEDURE — 99283 EMERGENCY DEPT VISIT LOW MDM: CPT

## 2022-03-16 PROCEDURE — 71046 X-RAY EXAM CHEST 2 VIEWS: CPT

## 2022-03-16 NOTE — ED NOTES
Bedside and Verbal shift change report given to Tammy Carter (oncoming nurse) by Froilan Mckeon RN (offgoing nurse). Report included the following information SBAR, Kardex and ED Summary.

## 2022-03-16 NOTE — ED TRIAGE NOTES
Pt presents to ER with c/o heaviness on right side of her chest and feeling SOB. She has h/o spontaneous pneumothorax x4 in the past. Her last time was after a forceful cough. Pt reports choking on her soda last night and coughing forcefully. She woke up this am with SOB and \"heaviness\" on right side of chest.     Denies dizziness, diaphoresis, CP.

## 2022-03-16 NOTE — DISCHARGE INSTRUCTIONS
You presented to the ED with right-sided shortness of breath concern for pneumothorax. Chest x-ray shows no pneumothorax or right-sided pleural thickening. You stated your regular pulmonologist has retired so you may follow-up with pulmonary associates her Dr. Richie Tejada is listed below.

## 2022-03-17 NOTE — ED PROVIDER NOTES
Patient is a 51-year-old female who has had 5 spontaneous pneumothorax in the past requiring chest tubes. Patient coughed yesterday and then started having right-sided chest fullness similar to her previous pneumothorax. Vital signs are stable, patient is afebrile. ABCs intact    The history is provided by the patient and a parent. Shortness of Breath  This is a recurrent problem. The problem occurs intermittently. The current episode started yesterday. The problem has not changed since onset. Pertinent negatives include no fever, no cough, no orthopnea and no abdominal pain. Precipitated by: Cough. She has tried nothing for the symptoms. The treatment provided no relief. She has had prior hospitalizations. She has had prior ED visits. Associated medical issues comments: Previous pneumothorax.         Past Medical History:   Diagnosis Date    Anemia     Anemia     Diabetes ([facility] [address]     type 1    Dysmenorrhea     Endometriosis     GERD (gastroesophageal reflux disease)     Mitral valve prolapse     Spontaneous pneumothorax 3/2013 and 8/2013    recurrent right--total of 4 episodes, 2 required surgeries, above dates       Past Surgical History:   Procedure Laterality Date    HX DILATION AND CURETTAGE  09/20/2018    HX OTHER SURGICAL  2009    ex lap, abdominal for endometriosis    HX OTHER SURGICAL  3/21/2013    Bronch, RVATS, apical bleb rsxn,parietal pleurectomy    HX OTHER SURGICAL  8/21/2013    Bronchoscopy, RVATS and talc pleurodesis         Family History:   Problem Relation Age of Onset    Hypertension Mother     Diabetes Father     Heart Disease Father     Anemia Father        Social History     Socioeconomic History    Marital status:      Spouse name: Not on file    Number of children: Not on file    Years of education: Not on file    Highest education level: Not on file   Occupational History    Not on file   Tobacco Use    Smoking status: Never Smoker    Smokeless tobacco: Never Used   Substance and Sexual Activity    Alcohol use: Yes     Comment: rarely    Drug use: No    Sexual activity: Not on file   Other Topics Concern    Not on file   Social History Narrative    Not on file     Social Determinants of Health     Financial Resource Strain:     Difficulty of Paying Living Expenses: Not on file   Food Insecurity:     Worried About Running Out of Food in the Last Year: Not on file    Trent of Food in the Last Year: Not on file   Transportation Needs:     Lack of Transportation (Medical): Not on file    Lack of Transportation (Non-Medical): Not on file   Physical Activity:     Days of Exercise per Week: Not on file    Minutes of Exercise per Session: Not on file   Stress:     Feeling of Stress : Not on file   Social Connections:     Frequency of Communication with Friends and Family: Not on file    Frequency of Social Gatherings with Friends and Family: Not on file    Attends Restorationism Services: Not on file    Active Member of [address] [place] [address] or Organizations: Not on file    Attends Club or Organization Meetings: Not on file    Marital Status: Not on file   Intimate Partner Violence:     Fear of Current or Ex-Partner: Not on file    Emotionally Abused: Not on file    Physically Abused: Not on file    Sexually Abused: Not on file   Housing Stability:     Unable to Pay for Housing in the Last Year: Not on file    Number of [place] in the Last Year: Not on file    Unstable Housing in the Last Year: Not on file         ALLERGIES: Patient has no known allergies. Review of Systems   Constitutional: Negative. Negative for fever. HENT: Negative. Eyes: Negative. Respiratory: Positive for shortness of breath. Negative for cough. Cardiovascular: Negative. Negative for orthopnea. Gastrointestinal: Negative. Negative for abdominal pain. Endocrine: Negative. Genitourinary: Negative. Musculoskeletal: Negative. Skin: Negative.     Allergic/Immunologic: Negative. Neurological: Negative. Hematological: Negative. Psychiatric/Behavioral: Negative. Vitals:    03/16/22 1808 03/16/22 1813   BP:  (!) 160/85   Pulse: 86 89   Resp:  18   Temp:  97.8 °F (36.6 °C)   SpO2: 99% 100%   Weight:  56.7 kg (125 lb)   Height:  5' 2\" (1.575 m)            Physical Exam  Vitals and nursing note reviewed. Constitutional:       General: She is not in acute distress. Appearance: Normal appearance. HENT:      Head: Normocephalic and atraumatic. Right Ear: External ear normal.      Left Ear: External ear normal.      Nose: Nose normal.   Eyes:      Extraocular Movements: Extraocular movements intact. Conjunctiva/sclera: Conjunctivae normal.   Cardiovascular:      Rate and Rhythm: Normal rate. Pulses: Normal pulses. Radial pulses are 2+ on the right side and 2+ on the left side. Heart sounds: Normal heart sounds. Pulmonary:      Effort: Pulmonary effort is normal.      Breath sounds: Normal breath sounds. No decreased breath sounds. Chest:      Chest wall: No deformity or tenderness. Abdominal:      General: Abdomen is flat. There is no distension. Tenderness: There is no abdominal tenderness. Musculoskeletal:         General: No deformity or signs of injury. Normal range of motion. Cervical back: Normal range of motion and neck supple. No tenderness. Skin:     General: Skin is warm and dry. Capillary Refill: Capillary refill takes less than 2 seconds. Neurological:      General: No focal deficit present. Mental Status: She is alert and oriented to person, place, and time. Psychiatric:         Attention and Perception: Attention normal.         Mood and Affect: Mood normal.         Behavior: Behavior normal.          MDM       IMAGING RESULTS:  XR CHEST PA LAT   Final Result   Right pleural thickening. No recurrent pneumothorax.           MEDICATIONS GIVEN:  Medications - No data to display    Differential diagnosis: Pleurisy, pneumothorax    ED physician interpretation of imaging: Chest x-ray without pneumothorax, focal pneumonia    MDM: Patient is a 63-year-old female presented ED with right-sided fullness chest with a history of pneumothorax with ED chest x-ray showing no signs of pneumothorax and only pleural thickening who is stable with no acute findings propria for discharge home. Patient given information for pulmonology for follow-up as she has had frequent pneumothorax in her previous pulmonologist has retired/left. Key Discharge Instructions and summary of care: You presented to the ED with right-sided shortness of breath concern for pneumothorax. Chest x-ray shows no pneumothorax or right-sided pleural thickening. You stated your regular pulmonologist has retired so you may follow-up with pulmonary associates, Dr. Kyle Moody is listed below. ED Impression:    ICD-10-CM ICD-9-CM    1. Pleural thickening  J92.9 511.0     right   2. History of pneumothorax  Z87.09 V12.69         DISPOSITION: Discharged    Jam Aquino MD          Procedures

## 2022-03-18 PROBLEM — K66.8 PNEUMOPERITONEUM: Status: ACTIVE | Noted: 2021-01-06

## 2022-03-19 PROBLEM — N20.0 KIDNEY STONE: Status: ACTIVE | Noted: 2018-09-26

## 2022-03-19 PROBLEM — D50.9 MICROCYTIC ANEMIA: Status: ACTIVE | Noted: 2019-07-16

## 2022-03-19 PROBLEM — E10.9 TYPE 1 DIABETES MELLITUS (HCC): Status: ACTIVE | Noted: 2018-09-26

## 2022-03-19 PROBLEM — D64.9 ANEMIA: Status: ACTIVE | Noted: 2018-09-26

## 2022-03-19 PROBLEM — N92.0 MENORRHAGIA: Status: ACTIVE | Noted: 2018-09-26

## 2022-03-20 PROBLEM — K21.9 GERD (GASTROESOPHAGEAL REFLUX DISEASE): Status: ACTIVE | Noted: 2021-01-06

## 2022-03-20 PROBLEM — R06.02 SHORTNESS OF BREATH: Status: ACTIVE | Noted: 2021-01-05

## 2022-04-29 ENCOUNTER — TRANSCRIBE ORDER (OUTPATIENT)
Dept: SCHEDULING | Age: 47
End: 2022-04-29

## 2022-04-29 DIAGNOSIS — R07.89 CHEST HEAVINESS: Primary | ICD-10-CM

## 2022-05-16 ENCOUNTER — APPOINTMENT (OUTPATIENT)
Dept: CT IMAGING | Age: 47
End: 2022-05-16
Attending: EMERGENCY MEDICINE
Payer: COMMERCIAL

## 2022-05-16 ENCOUNTER — HOSPITAL ENCOUNTER (OUTPATIENT)
Dept: CT IMAGING | Age: 47
Discharge: HOME | End: 2022-05-16
Attending: NURSE PRACTITIONER
Payer: COMMERCIAL

## 2022-05-16 ENCOUNTER — HOSPITAL ENCOUNTER (EMERGENCY)
Age: 47
Discharge: HOME | End: 2022-05-16
Attending: EMERGENCY MEDICINE
Payer: COMMERCIAL

## 2022-05-16 VITALS
TEMPERATURE: 98.5 F | WEIGHT: 124 LBS | BODY MASS INDEX: 21.97 KG/M2 | DIASTOLIC BLOOD PRESSURE: 88 MMHG | RESPIRATION RATE: 17 BRPM | OXYGEN SATURATION: 97 % | HEIGHT: 63 IN | HEART RATE: 77 BPM | SYSTOLIC BLOOD PRESSURE: 128 MMHG

## 2022-05-16 DIAGNOSIS — J43.9 BULLA, LUNG (HCC): Primary | ICD-10-CM

## 2022-05-16 DIAGNOSIS — R07.89 CHEST HEAVINESS: ICD-10-CM

## 2022-05-16 DIAGNOSIS — K66.8 FREE INTRAPERITONEAL AIR: ICD-10-CM

## 2022-05-16 LAB
ALBUMIN SERPL-MCNC: 3.7 G/DL (ref 3.5–5)
ALBUMIN/GLOB SERPL: 0.9 {RATIO} (ref 1.1–2.2)
ALP SERPL-CCNC: 121 U/L (ref 45–117)
ALT SERPL-CCNC: 30 U/L (ref 12–78)
ANION GAP SERPL CALC-SCNC: 7 MMOL/L (ref 5–15)
AST SERPL-CCNC: 24 U/L (ref 15–37)
BASOPHILS # BLD: 0 K/UL (ref 0–0.1)
BASOPHILS NFR BLD: 1 % (ref 0–1)
BILIRUB SERPL-MCNC: 0.3 MG/DL (ref 0.2–1)
BUN SERPL-MCNC: 9 MG/DL (ref 6–20)
BUN/CREAT SERPL: 13 (ref 12–20)
CALCIUM SERPL-MCNC: 9.1 MG/DL (ref 8.5–10.1)
CHLORIDE SERPL-SCNC: 104 MMOL/L (ref 97–108)
CO2 SERPL-SCNC: 27 MMOL/L (ref 21–32)
COMMENT, HOLDF: NORMAL
CREAT BLD-MCNC: 0.7 MG/DL (ref 0.6–1.3)
CREAT SERPL-MCNC: 0.72 MG/DL (ref 0.55–1.02)
DIFFERENTIAL METHOD BLD: NORMAL
EOSINOPHIL # BLD: 0.1 K/UL (ref 0–0.4)
EOSINOPHIL NFR BLD: 1 % (ref 0–7)
ERYTHROCYTE [DISTWIDTH] IN BLOOD BY AUTOMATED COUNT: 12.8 % (ref 11.5–14.5)
GLOBULIN SER CALC-MCNC: 4.3 G/DL (ref 2–4)
GLUCOSE SERPL-MCNC: 227 MG/DL (ref 65–100)
HCT VFR BLD AUTO: 40.3 % (ref 35–47)
HGB BLD-MCNC: 12.8 G/DL (ref 11.5–16)
IMM GRANULOCYTES # BLD AUTO: 0 K/UL (ref 0–0.04)
IMM GRANULOCYTES NFR BLD AUTO: 0 % (ref 0–0.5)
LIPASE SERPL-CCNC: 121 U/L (ref 73–393)
LYMPHOCYTES # BLD: 1.5 K/UL (ref 0.8–3.5)
LYMPHOCYTES NFR BLD: 19 % (ref 12–49)
MCH RBC QN AUTO: 26.7 PG (ref 26–34)
MCHC RBC AUTO-ENTMCNC: 31.8 G/DL (ref 30–36.5)
MCV RBC AUTO: 84 FL (ref 80–99)
MONOCYTES # BLD: 0.5 K/UL (ref 0–1)
MONOCYTES NFR BLD: 6 % (ref 5–13)
NEUTS SEG # BLD: 5.8 K/UL (ref 1.8–8)
NEUTS SEG NFR BLD: 73 % (ref 32–75)
NRBC # BLD: 0 K/UL (ref 0–0.01)
NRBC BLD-RTO: 0 PER 100 WBC
PLATELET # BLD AUTO: 249 K/UL (ref 150–400)
PMV BLD AUTO: 10.9 FL (ref 8.9–12.9)
POTASSIUM SERPL-SCNC: 3.5 MMOL/L (ref 3.5–5.1)
PROT SERPL-MCNC: 8 G/DL (ref 6.4–8.2)
RBC # BLD AUTO: 4.8 M/UL (ref 3.8–5.2)
SAMPLES BEING HELD,HOLD: NORMAL
SODIUM SERPL-SCNC: 138 MMOL/L (ref 136–145)
WBC # BLD AUTO: 8 K/UL (ref 3.6–11)

## 2022-05-16 PROCEDURE — 85025 COMPLETE CBC W/AUTO DIFF WBC: CPT

## 2022-05-16 PROCEDURE — 74011000636 HC RX REV CODE- 636: Performed by: NURSE PRACTITIONER

## 2022-05-16 PROCEDURE — 82565 ASSAY OF CREATININE: CPT

## 2022-05-16 PROCEDURE — 99284 EMERGENCY DEPT VISIT MOD MDM: CPT

## 2022-05-16 PROCEDURE — 83690 ASSAY OF LIPASE: CPT

## 2022-05-16 PROCEDURE — 71275 CT ANGIOGRAPHY CHEST: CPT

## 2022-05-16 PROCEDURE — 74176 CT ABD & PELVIS W/O CONTRAST: CPT

## 2022-05-16 PROCEDURE — 36415 COLL VENOUS BLD VENIPUNCTURE: CPT

## 2022-05-16 PROCEDURE — 80053 COMPREHEN METABOLIC PANEL: CPT

## 2022-05-16 RX ADMIN — IOPAMIDOL 80 ML: 755 INJECTION, SOLUTION INTRAVENOUS at 16:37

## 2022-05-16 NOTE — ED PROVIDER NOTES
Pedro Wang is a 54 yo F with history bullous lung disease with prior spontaneous pneumothorax and pneumoperitoneum who was referred to the ED by outpatient radiology where she was found to have intraperitoneal air on CTA of her chest.  She states she has been having heaviness in her chest after a coughing spell from drinking a carbonated beverage. She went to her pulmonologist who perfomed PFTs and ordered a CTA of her chest.  She denies abdominal pain.              Past Medical History:   Diagnosis Date    Anemia     Anemia     Diabetes ([address]     type 1    Dysmenorrhea     Endometriosis     GERD (gastroesophageal reflux disease)     Mitral valve prolapse     Spontaneous pneumothorax 3/2013 and 8/2013    recurrent right--total of 4 episodes, 2 required surgeries, above dates       Past Surgical History:   Procedure Laterality Date    HX DILATION AND CURETTAGE  09/20/2018    HX OTHER SURGICAL  2009    ex lap, abdominal for endometriosis    HX OTHER SURGICAL  3/21/2013    Bronch, RVATS, apical bleb rsxn,parietal pleurectomy    HX OTHER SURGICAL  8/21/2013    Bronchoscopy, RVATS and talc pleurodesis         Family History:   Problem Relation Age of Onset    Hypertension Mother     Diabetes Father     Heart Disease Father     Anemia Father        Social History     Socioeconomic History    Marital status:      Spouse name: Not on file    Number of children: Not on file    Years of education: Not on file    Highest education level: Not on file   Occupational History    Not on file   Tobacco Use    Smoking status: Never Smoker    Smokeless tobacco: Never Used   Substance and Sexual Activity    Alcohol use: Yes     Comment: rarely    Drug use: No    Sexual activity: Not on file   Other Topics Concern    Not on file   Social History Narrative    Not on file     Social Determinants of Health     Financial Resource Strain:     Difficulty of Paying Living Expenses: Not on file Food Insecurity:     Worried About Running Out of Food in the Last Year: Not on file    Trent of Food in the Last Year: Not on file   Transportation Needs:     Lack of Transportation (Medical): Not on file    Lack of Transportation (Non-Medical): Not on file   Physical Activity:     Days of Exercise per Week: Not on file    Minutes of Exercise per Session: Not on file   Stress:     Feeling of Stress : Not on file   Social Connections:     Frequency of Communication with Friends and Family: Not on file    Frequency of Social Gatherings with Friends and Family: Not on file    Attends Jainism Services: Not on file    Active Member of [address] [place] [address] [employer] or Organizations: Not on file    Attends Club or Organization Meetings: Not on file    Marital Status: Not on file   Intimate Partner Violence:     Fear of Current or Ex-Partner: Not on file    Emotionally Abused: Not on file    Physically Abused: Not on file    Sexually Abused: Not on file   Housing Stability:     Unable to Pay for Housing in the Last Year: Not on file    Number of [place] in the Last Year: Not on file    Unstable Housing in the Last Year: Not on file         ALLERGIES: Patient has no known allergies. Review of Systems   Constitutional: Negative for fever. HENT: Negative for sore throat. Eyes: Negative for visual disturbance. Respiratory: Negative for cough. Cardiovascular: Negative for chest pain. Gastrointestinal: Negative for abdominal pain. Genitourinary: Negative for dysuria. Musculoskeletal: Negative for back pain. Skin: Negative for rash. Neurological: Negative for headaches. Vitals:    05/16/22 1741   BP: (!) 148/100   Pulse: 82   Resp: 16   Temp: 98.5 °F (36.9 °C)   SpO2: 99%   Weight: 56.2 kg (124 lb)   Height: 5' 3\" (1.6 m)            Physical Exam  Vitals and nursing note reviewed. Constitutional:       General: She is not in acute distress. Appearance: She is well-developed.    HENT: Head: Normocephalic and atraumatic. Eyes:      Conjunctiva/sclera: Conjunctivae normal.   Neck:      Trachea: Phonation normal.   Cardiovascular:      Rate and Rhythm: Normal rate. Pulmonary:      Effort: Pulmonary effort is normal. No respiratory distress. Abdominal:      General: There is no distension. Tenderness: There is no abdominal tenderness. There is no guarding or rebound. Musculoskeletal:         General: No tenderness. Normal range of motion. Cervical back: Normal range of motion. Skin:     General: Skin is warm and dry. Neurological:      Mental Status: She is alert. She is not disoriented. Motor: No abnormal muscle tone. MDM         8:07 PM  Patient reassessed and remains in no distress. Abdomen remains soft and non tender. WBC normal.  Discussed with general surgery, Dr. Arabella Meyer who has reviewed CTA chest and Ct abdomen/pelvis. And with benign abdominal exam and labs suspects that air is from ruptured pulmonary bulla. Recommend discharge home with return to ED precautions. Recommendations discussed with patient who agrees with plan.    Procedures

## 2022-05-16 NOTE — ED TRIAGE NOTES
Pt states she had been feeling chest heaviness after a coughing spell from drinking a carbonated drink, therefore her PCP referred her to have a lung capacity which was normal.  PCP then referred her to have a CT of the chest.  She has hx of spontaneous pneumothorax (has had 4 episodes). CT done today shows air in her abdomen.

## 2022-05-17 NOTE — ED NOTES
Discharge instructions reviewed with pt who verbalized understanding. Opportunity for questions provided.

## 2023-03-05 ENCOUNTER — APPOINTMENT (OUTPATIENT)
Dept: GENERAL RADIOLOGY | Age: 48
End: 2023-03-05
Attending: EMERGENCY MEDICINE
Payer: COMMERCIAL

## 2023-03-05 ENCOUNTER — HOSPITAL ENCOUNTER (EMERGENCY)
Age: 48
Discharge: HOME | End: 2023-03-05
Attending: EMERGENCY MEDICINE
Payer: COMMERCIAL

## 2023-03-05 VITALS
TEMPERATURE: 98.6 F | RESPIRATION RATE: 10 BRPM | HEART RATE: 78 BPM | BODY MASS INDEX: 22.63 KG/M2 | OXYGEN SATURATION: 98 % | HEIGHT: 62 IN | WEIGHT: 123 LBS | SYSTOLIC BLOOD PRESSURE: 122 MMHG | DIASTOLIC BLOOD PRESSURE: 78 MMHG

## 2023-03-05 DIAGNOSIS — R06.09 DYSPNEA ON EXERTION: Primary | ICD-10-CM

## 2023-03-05 LAB
ALBUMIN SERPL-MCNC: 3.5 G/DL (ref 3.5–5)
ALBUMIN/GLOB SERPL: 0.9 (ref 1.1–2.2)
ALP SERPL-CCNC: 97 U/L (ref 45–117)
ALT SERPL-CCNC: 51 U/L (ref 12–78)
ANION GAP SERPL CALC-SCNC: 4 MMOL/L (ref 5–15)
AST SERPL-CCNC: 33 U/L (ref 15–37)
BASOPHILS # BLD: 0 K/UL (ref 0–0.1)
BASOPHILS NFR BLD: 1 % (ref 0–1)
BILIRUB SERPL-MCNC: 0.6 MG/DL (ref 0.2–1)
BNP SERPL-MCNC: 47 PG/ML
BUN SERPL-MCNC: 14 MG/DL (ref 6–20)
BUN/CREAT SERPL: 17 (ref 12–20)
CALCIUM SERPL-MCNC: 9.4 MG/DL (ref 8.5–10.1)
CHLORIDE SERPL-SCNC: 106 MMOL/L (ref 97–108)
CO2 SERPL-SCNC: 26 MMOL/L (ref 21–32)
COMMENT, HOLDF: NORMAL
CREAT SERPL-MCNC: 0.83 MG/DL (ref 0.55–1.02)
DIFFERENTIAL METHOD BLD: NORMAL
EOSINOPHIL # BLD: 0.1 K/UL (ref 0–0.4)
EOSINOPHIL NFR BLD: 1 % (ref 0–7)
ERYTHROCYTE [DISTWIDTH] IN BLOOD BY AUTOMATED COUNT: 13.1 % (ref 11.5–14.5)
GLOBULIN SER CALC-MCNC: 4.1 G/DL (ref 2–4)
GLUCOSE SERPL-MCNC: 281 MG/DL (ref 65–100)
HCT VFR BLD AUTO: 39.8 % (ref 35–47)
HGB BLD-MCNC: 12.7 G/DL (ref 11.5–16)
IMM GRANULOCYTES # BLD AUTO: 0 K/UL (ref 0–0.04)
IMM GRANULOCYTES NFR BLD AUTO: 0 % (ref 0–0.5)
LYMPHOCYTES # BLD: 1.4 K/UL (ref 0.8–3.5)
LYMPHOCYTES NFR BLD: 22 % (ref 12–49)
MCH RBC QN AUTO: 27 PG (ref 26–34)
MCHC RBC AUTO-ENTMCNC: 31.9 G/DL (ref 30–36.5)
MCV RBC AUTO: 84.7 FL (ref 80–99)
MONOCYTES # BLD: 0.5 K/UL (ref 0–1)
MONOCYTES NFR BLD: 8 % (ref 5–13)
NEUTS SEG # BLD: 4.2 K/UL (ref 1.8–8)
NEUTS SEG NFR BLD: 68 % (ref 32–75)
NRBC # BLD: 0 K/UL (ref 0–0.01)
NRBC BLD-RTO: 0 PER 100 WBC
PLATELET # BLD AUTO: 259 K/UL (ref 150–400)
PMV BLD AUTO: 11.2 FL (ref 8.9–12.9)
POTASSIUM SERPL-SCNC: 3.8 MMOL/L (ref 3.5–5.1)
PROT SERPL-MCNC: 7.6 G/DL (ref 6.4–8.2)
RBC # BLD AUTO: 4.7 M/UL (ref 3.8–5.2)
SAMPLES BEING HELD,HOLD: NORMAL
SODIUM SERPL-SCNC: 136 MMOL/L (ref 136–145)
TROPONIN I SERPL HS-MCNC: <4 NG/L (ref 0–51)
WBC # BLD AUTO: 6.1 K/UL (ref 3.6–11)

## 2023-03-05 PROCEDURE — 99285 EMERGENCY DEPT VISIT HI MDM: CPT

## 2023-03-05 PROCEDURE — 83880 ASSAY OF NATRIURETIC PEPTIDE: CPT

## 2023-03-05 PROCEDURE — 36415 COLL VENOUS BLD VENIPUNCTURE: CPT

## 2023-03-05 PROCEDURE — 93005 ELECTROCARDIOGRAM TRACING: CPT

## 2023-03-05 PROCEDURE — 71046 X-RAY EXAM CHEST 2 VIEWS: CPT

## 2023-03-05 PROCEDURE — 84484 ASSAY OF TROPONIN QUANT: CPT

## 2023-03-05 PROCEDURE — 80053 COMPREHEN METABOLIC PANEL: CPT

## 2023-03-05 PROCEDURE — 85025 COMPLETE CBC W/AUTO DIFF WBC: CPT

## 2023-03-05 NOTE — ED TRIAGE NOTES
Patient arrives to the ED via POV with complaints of shortness of breath and fatigue that started 3 weeks ago. Denies fevers or chills.

## 2023-03-05 NOTE — ED PROVIDER NOTES
Ivone Sanders is a 53 yo F with hh/o DM and prior instances of spontaneous pneumothrorax who has been having intermittent dyspnea with exertion for the past 3 weeks. She denies nasal congestion, chest pain or fever.   She states she saw a cardiologist, Dr. María Childers about a year ago and had a stress test and echo that was normal.           Past Medical History:   Diagnosis Date    Anemia     Anemia     Diabetes ([address]     type 1    Dysmenorrhea     Endometriosis     GERD (gastroesophageal reflux disease)     Mitral valve prolapse     Spontaneous pneumothorax 3/2013 and 8/2013    recurrent right--total of 4 episodes, 2 required surgeries, above dates       Past Surgical History:   Procedure Laterality Date    HX DILATION AND CURETTAGE  09/20/2018    HX OTHER SURGICAL  2009    ex lap, abdominal for endometriosis    HX OTHER SURGICAL  3/21/2013    Bronch, RVATS, apical bleb rsxn,parietal pleurectomy    HX OTHER SURGICAL  8/21/2013    Bronchoscopy, RVATS and talc pleurodesis         Family History:   Problem Relation Age of Onset    Hypertension Mother     Diabetes Father     Heart Disease Father     Anemia Father        Social History     Socioeconomic History    Marital status:      Spouse name: Not on file    Number of children: Not on file    Years of education: Not on file    Highest education level: Not on file   Occupational History    Not on file   Tobacco Use    Smoking status: Never    Smokeless tobacco: Never   Substance and Sexual Activity    Alcohol use: Yes     Comment: rarely    Drug use: No    Sexual activity: Not on file   Other Topics Concern    Not on file   Social History Narrative    Not on file     Social Determinants of Health     Financial Resource Strain: Not on file   Food Insecurity: Not on file   Transportation Needs: Not on file   Physical Activity: Not on file   Stress: Not on file   Social Connections: Not on file   Intimate Partner Violence: Not on file   Housing Stability: Not on file         ALLERGIES: Patient has no known allergies. Review of Systems   Constitutional:  Negative for fever. HENT:  Negative for sore throat. Eyes:  Negative for visual disturbance. Respiratory:  Positive for shortness of breath (with exertion). Negative for cough. Cardiovascular:  Negative for chest pain. Gastrointestinal:  Negative for abdominal pain. Genitourinary:  Negative for dysuria. Musculoskeletal:  Negative for back pain. Skin:  Negative for rash. Neurological:  Negative for headaches. Vitals:    03/05/23 1634   BP: (!) 149/84   Pulse: (!) 101   Resp: 16   Temp: 98.6 °F (37 °C)   SpO2: 98%   Weight: 55.8 kg (123 lb)   Height: 5' 2\" (1.575 m)            Physical Exam  Vitals and nursing note reviewed. Constitutional:       General: She is not in acute distress. Appearance: She is well-developed. HENT:      Head: Normocephalic and atraumatic. Mouth/Throat:      Mouth: Mucous membranes are moist.   Eyes:      Extraocular Movements: Extraocular movements intact. Conjunctiva/sclera: Conjunctivae normal.   Neck:      Trachea: Phonation normal.   Cardiovascular:      Rate and Rhythm: Normal rate and regular rhythm. Pulmonary:      Effort: Pulmonary effort is normal. No respiratory distress. Breath sounds: Normal breath sounds. No wheezing or rales. Abdominal:      General: There is no distension. Musculoskeletal:         General: No tenderness. Normal range of motion. Cervical back: Normal range of motion. Skin:     General: Skin is warm and dry. Neurological:      General: No focal deficit present. Mental Status: She is alert. She is not disoriented. Motor: No abnormal muscle tone. Medical Decision Making  Amount and/or Complexity of Data Reviewed  Labs: ordered. Radiology: ordered. ECG/medicine tests: ordered.       ED Course as of 03/05/23 1820   Sun Mar 05, 2023   [id number] EKG shows normal sinus rhythm with evidence of prior septal infarct but no acute ST or T wave abnormalities suggestive of ischemia. [WJ]      ED Course User Index  [WJ] Tegan Websterh, DO         6:20 PM  Patient reassessed and remains in no distress. Labs normal including trop <4. CXR with slightly increased chronic pleural parenchymal thickening in right lung. Lung remain clear with no wheezing or rales. Patient confirms she has a pulmonologist and will follow -up with them.     Procedures

## 2023-03-06 LAB
ATRIAL RATE: 92 BPM
CALCULATED P AXIS, ECG09: 55 DEGREES
CALCULATED T AXIS, ECG11: 16 DEGREES
DIAGNOSIS, 93000: NORMAL
P-R INTERVAL, ECG05: 140 MS
Q-T INTERVAL, ECG07: 338 MS
QRS DURATION, ECG06: 66 MS
QTC CALCULATION (BEZET), ECG08: 417 MS
VENTRICULAR RATE, ECG03: 92 BPM

## 2023-03-07 ENCOUNTER — TRANSCRIBE ORDER (OUTPATIENT)
Dept: SCHEDULING | Age: 48
End: 2023-03-07

## 2023-03-07 DIAGNOSIS — R93.89 ABNORMAL CHEST CT: Primary | ICD-10-CM

## 2023-03-14 ENCOUNTER — HOSPITAL ENCOUNTER (OUTPATIENT)
Dept: CT IMAGING | Age: 48
Discharge: HOME | End: 2023-03-14
Attending: NURSE PRACTITIONER
Payer: COMMERCIAL

## 2023-03-14 DIAGNOSIS — R93.89 ABNORMAL CHEST CT: ICD-10-CM

## 2023-03-14 PROCEDURE — 71250 CT THORAX DX C-: CPT

## 2023-04-17 ENCOUNTER — HOSPITAL ENCOUNTER (EMERGENCY)
Age: 48
Discharge: HOME | End: 2023-04-17
Attending: STUDENT IN AN ORGANIZED HEALTH CARE EDUCATION/TRAINING PROGRAM
Payer: COMMERCIAL

## 2023-04-17 VITALS
HEART RATE: 90 BPM | HEIGHT: 62 IN | BODY MASS INDEX: 23 KG/M2 | DIASTOLIC BLOOD PRESSURE: 86 MMHG | TEMPERATURE: 98.3 F | WEIGHT: 125 LBS | SYSTOLIC BLOOD PRESSURE: 132 MMHG | RESPIRATION RATE: 16 BRPM | OXYGEN SATURATION: 99 %

## 2023-04-17 DIAGNOSIS — A08.4 VIRAL ENTERITIS: Primary | ICD-10-CM

## 2023-04-17 LAB — SARS-COV-2, COV2: NORMAL

## 2023-04-17 PROCEDURE — U0003 INFECTIOUS AGENT DETECTION BY NUCLEIC ACID (DNA OR RNA); SEVERE ACUTE RESPIRATORY SYNDROME CORONAVIRUS 2 (SARS-COV-2) (CORONAVIRUS DISEASE [COVID-19]), AMPLIFIED PROBE TECHNIQUE, MAKING USE OF HIGH THROUGHPUT TECHNOLOGIES AS DESCRIBED BY CMS-2020-01-R: HCPCS

## 2023-04-17 PROCEDURE — 74011250637 HC RX REV CODE- 250/637: Performed by: STUDENT IN AN ORGANIZED HEALTH CARE EDUCATION/TRAINING PROGRAM

## 2023-04-17 PROCEDURE — 99283 EMERGENCY DEPT VISIT LOW MDM: CPT

## 2023-04-17 RX ORDER — LISINOPRIL 2.5 MG/1
2.5 TABLET ORAL DAILY
COMMUNITY

## 2023-04-17 RX ORDER — LOPERAMIDE HYDROCHLORIDE 2 MG/1
2 CAPSULE ORAL ONCE
Status: COMPLETED | OUTPATIENT
Start: 2023-04-17 | End: 2023-04-17

## 2023-04-17 RX ORDER — ONDANSETRON 4 MG/1
4 TABLET, FILM COATED ORAL
Qty: 20 TABLET | Refills: 0 | Status: SHIPPED | OUTPATIENT
Start: 2023-04-17

## 2023-04-17 RX ORDER — ESCITALOPRAM OXALATE 5 MG/1
5 TABLET ORAL DAILY
COMMUNITY

## 2023-04-17 RX ORDER — LOPERAMIDE HCL 2 MG
2 TABLET ORAL
Qty: 20 TABLET | Refills: 0 | Status: SHIPPED | OUTPATIENT
Start: 2023-04-17 | End: 2023-04-27

## 2023-04-17 RX ADMIN — LOPERAMIDE HYDROCHLORIDE 2 MG: 2 CAPSULE ORAL at 22:09

## 2023-04-18 LAB
SARS-COV-2 RNA RESP QL NAA+PROBE: NOT DETECTED
SOURCE, COVRS: NORMAL

## 2023-04-18 NOTE — DISCHARGE INSTRUCTIONS
Thank you! Thank you for allowing me to care for you in the emergency department. I sincerely hope that you are satisfied with your visit today. It is my goal to provide you with excellent care. Below you will find a list of your labs and imaging from your visit today if applicable. Should you have any questions regarding these results please do not hesitate to call the emergency department. Please review [facility] for a more detailed result list since the below list may not be comprehensive. Instructions on how to sign up to [facility] should be provided in this packet. Labs -   No results found for this or any previous visit (from the past 12 hour(s)). Radiologic Studies -   No orders to display     CT Results  (Last 48 hours)      None          CXR Results  (Last 48 hours)      None               If you feel that you have not received excellent quality care or timely care, please ask to speak to the nurse manager. Please choose us in the future for your continued health care needs. ------------------------------------------------------------------------------------------------------------  The exam and treatment you received in the Emergency Department were for an urgent problem and are not intended as complete care. It is important that you follow-up with a doctor, nurse practitioner, or physician assistant to:  (1) confirm your diagnosis,  (2) re-evaluation of changes in your illness and treatment, and  (3) for ongoing care. If your symptoms become worse or you do not improve as expected and you are unable to reach your usual health care provider, you should return to the Emergency Department. We are available 24 hours a day. Please take your discharge instructions with you when you go to your follow-up appointment. If a prescription has been provided, please have it filled as soon as possible to prevent a delay in treatment.  Read the entire medication instruction sheet provided to you by the pharmacy. If you have any questions or reservations about taking the medication due to side effects or interactions with other medications, please call your primary care physician or contact the ER to speak with the charge nurse. Make an appointment with your family doctor or the physician you were referred to for follow-up of this visit as instructed on your discharge paperwork, as this is a mandatory follow-up. Return to the ER if you are unable to be seen or if you are unable to be seen in a timely manner. If you have any problem arranging the follow-up visit, contact the Emergency Department immediately.

## 2023-04-18 NOTE — ED TRIAGE NOTES
States her  was diagnosed with covid last week. Since then, she has developed diarrhea and a headache. States headache is a 6 at this time.  Denies any abdominal pain, vomiting or fever

## 2023-04-18 NOTE — ED PROVIDER NOTES
[id number]  EMERGENCY DEPARTMENT ENCOUNTER NOTE    Date: 2023  Patient Name: Gus Lopez    History of Presenting Illness     Chief Complaint   Patient presents with    Diarrhea       History obtained from: Patient    HPI: Gus Lopez, 52 y.o. female with a past medical history and outpatient medications as listed and reviewed below  presents for diarrhea. The patient has a 4 day history of diarrhea that was described as watery nonbloody. Nausea and vomiting were not present. Abdominal pain was not present at all. Patient has been able to tolerate PO intake. Patient denies lightheadedness, dizziness, syncope, palpitations, or decreased urine output. Patient denies fevers, chills, dysuria, hematuria, chest pain, shortness of breath, cough, rhinorrhea, sore throat. No other acute complaints.     Medical History   I reviewed the medical, surgical, family, and social history, as well as allergies:    PCP: Lorraine Gilmore MD    Past Medical History:  Past Medical History:   Diagnosis Date    Anemia     Anemia     Diabetes ([address]     type 1    Dysmenorrhea     Endometriosis     GERD (gastroesophageal reflux disease)     Mitral valve prolapse     Spontaneous pneumothorax 3/2013 and 2013    recurrent right--total of 4 episodes, 2 required surgeries, above dates     Past Surgical History:  Past Surgical History:   Procedure Laterality Date    HX DILATION AND CURETTAGE  2018    HX OTHER SURGICAL      ex lap, abdominal for endometriosis    HX OTHER SURGICAL  3/21/2013    Bronch, RVATS, apical bleb rsxn,parietal pleurectomy    HX OTHER SURGICAL  2013    Bronchoscopy, RVATS and talc pleurodesis     Current Outpatient Medications:  Current Outpatient Medications   Medication Instructions    buPROPion (WELLBUTRIN) 75 mg, 2 TIMES DAILY    escitalopram oxalate (LEXAPRO) 5 mg, Oral, DAILY    insulin aspart U-100 (NOVOLOG) 100 unit/mL (3 mL) inpn SubCUTAneous, 3 TIMES DAILY BEFORE MEALS, Sliding scale     insulin degludec 32 Units, SubCUTAneous, EVERY BEDTIME    lisinopriL (PRINIVIL, ZESTRIL) 2.5 mg, Oral, DAILY    loperamide (IMODIUM A-D) 2 mg, Oral, 4 TIMES DAILY AS NEEDED    multivit-minerals/folic acid (WOMENS DAILY GUMMIES PO) 1 g, DAILY    omeprazole (PRILOSEC) 20 mg, DAILY AS NEEDED    ondansetron hcl (ZOFRAN) 4 mg, Oral, EVERY 8 HOURS AS NEEDED      Family History:  Family History   Problem Relation Age of Onset    Hypertension Mother     Diabetes Father     Heart Disease Father     Anemia Father      Social History:  Social History     Tobacco Use    Smoking status: Never    Smokeless tobacco: Never   Substance Use Topics    Alcohol use: Yes     Comment: rarely    Drug use: No     Allergies:  No Known Allergies    Review of Systems     Positives and pertinent negatives as per HPI, otherwise negative ROS. Physical Exam and Vital Signs   Vital Signs - Reviewed the patient's vital signs. Patient Vitals for the past 12 hrs:   Temp Pulse Resp BP SpO2   04/17/23 2042 98.3 °F (36.8 °C) 90 16 132/86 99 %     Physical Exam:    GENERAL: awake, alert, cooperative, not in distress  HEENT:  * Pupils equal, EOMI  * Head atraumatic  CV:  * audible heart sounds  * warm and perfused extremities bilaterally  PULMONARY: Good effort, no distress  ABDOMEN/: soft, no distension, no guarding, no abdominal tenderness  EXTREMITIES/BACK: warm and perfused, no tenderness, no edema  SKIN: no rashes or signs of trauma  NEURO:  * Speech clear  * Moves U&LE to command    Medical Decision Making     Patient is a 52 y.o. female presenting for diarrhea. Vitals reveal no significant abnormalities and physical exam reveals no significant abnormalities. Based on the history, physical exam, risk factors, and vitals signs, differential includes: gastroenteritis, functional diarrhea, mild dehydration.      This well-appearing patient presents with diarrhea with low suspicion for serious pathology given nontoxic appearance and benign physical examination. Differential also includes:    - Dehydration or electrolyte abnormalities: Patient has no symptoms, physical exam signs or vital sign findings to suggest clinically significant dehydration.  - Abdominal Pathologies: No symptoms or physical exam findings of abdominal tenderness or guarding to suggest intraabdominal pathology like appendicitis, obstruction, or pancreatitis. - UTI: Patient is unlikely to have a UTI as there is no urinary symptoms with a normal exam.    See ED Course and Reassessment for clinical evaluation and interpretations. Records Reviewed: Nursing Notes  Social Determinants of health affecting management: None    ED Course and Reassessment     ED Course:          Reassessment:    The patient presents with diarrhea. Based on the patient's clinical picture at this time and the evaluation as performed above, I see no evidence for more malignant underlying processes that contribute to the patient's presenting complaint or that require admission. There is no significant evidence for significant dehydration requiring admission either. Vital signs have been reassuring. The patient is tolerating PO intake and vital signs are stable. The possibility of more malignant occult pathology was discussed. It was discussed that the patient needs to return promptly with any worsening symptoms or changes, and that no emergency department workup, no matter how extensive, can definitely exclude some more serious intra-abdominal and pelvic processes early in the disease course, and that the emergency department workups can be falsely reassuring. Routine discharge counseling was given including the fact that any worsening, changing or persistent symptoms should prompt an immediate call or follow up with their primary physician or the emergency department. The importance of appropriate follow up was also discussed.  More extensive discharge instructions were given in the patient's discharge paperwork. After completion of evaluation and discussion of results and diagnoses, all the questions were answered. If required, all follow up appointments and treatments were discussed and explained. Diagnosis     Clinical Impression:   1. Viral enteritis        Final Disposition     DISCHARGED FROM EMERGENCY DEPARTMENT    Patient will be discharged from the Emergency Department in stable condition. All of the diagnostic tests were reviewed and any questions were answered. Diagnosis, results, follow up if applicable, and return precautions were discussed. I have also put together printed discharge instructions for them that include: 1) educational information regarding their diagnosis, 2) how to care for their diagnosis at home, as well a 3) list of reasons why they would want to return to the ED prior to their follow-up appointment, should their condition change. Any labs or imaging done in the ED will be either printed with the discharge paperwork or available through [employer]. DISCHARGE PLAN:  1. Current Discharge Medication List        START taking these medications    Details   loperamide (Imodium A-D) 2 mg tablet Take 1 Tablet by mouth four (4) times daily as needed for Diarrhea for up to 10 days. Qty: 20 Tablet, Refills: 0      ondansetron hcl (Zofran) 4 mg tablet Take 1 Tablet by mouth every eight (8) hours as needed for Nausea or Vomiting. Qty: 20 Tablet, Refills: 0           CONTINUE these medications which have NOT CHANGED    Details   lisinopriL (PRINIVIL, ZESTRIL) 2.5 mg tablet Take 1 Tablet by mouth daily. escitalopram oxalate (Lexapro) 5 mg tablet Take 1 Tablet by mouth daily. insulin degludec 100 unit/mL (3 mL) inpn 32 Units by SubCUTAneous route nightly. insulin aspart U-100 (NOVOLOG) 100 unit/mL (3 mL) inpn by SubCUTAneous route Before breakfast, lunch, and dinner.  Sliding scale      buPROPion [facility]) 75 mg tablet Take 75 mg by mouth two (2) times a day. multivit-minerals/folic acid (WOMENS DAILY GUMMIES PO) Take 1 g by mouth daily. omeprazole (PRILOSEC) 20 mg capsule Take 20 mg by mouth daily as needed. 2.   Follow-up Information       Follow up With Specialties Details Why Contact Info    [address] [facility] Emergency Medicine Go to  If symptoms worsen [address]  [phone]          3. Return to ED if worse    4. Current Discharge Medication List        START taking these medications    Details   loperamide (Imodium A-D) 2 mg tablet Take 1 Tablet by mouth four (4) times daily as needed for Diarrhea for up to 10 days. Qty: 20 Tablet, Refills: 0  Start date: 4/17/2023, End date: 4/27/2023      ondansetron hcl (Zofran) 4 mg tablet Take 1 Tablet by mouth every eight (8) hours as needed for Nausea or Vomiting. Qty: 20 Tablet, Refills: 0  Start date: 4/17/2023             Procedures, Critical Care, & Clinical Tools   Performed by: Paul Zhao MD  Procedures     Not Applicable     Results, Consults, Medications     Consults:  None   Labs:  No results found for this or any previous visit (from the past 12 hour(s)). Radiologic Studies:  CT Results  (Last 48 hours)      None          CXR Results  (Last 48 hours)      None          Medications ordered:  Medications   loperamide (IMODIUM) capsule 2 mg (has no administration in time range)       Documentation Comments   - I am the first and primary provider for this patient and am the primary provider of record. - Initial assessment performed. The patients presenting problems have been discussed, and the staff are in agreement with the care plan formulated and outlined with them. I have encouraged them to ask questions as they arise throughout their visit.   - Available medical records, nursing notes, old EKGs, and EMS run sheets (if patient was EMS transported) were reviewed    Please note that this dictation was completed with [employer], the computer voice recognition software. Quite often unanticipated grammatical, syntax, homophones, and other interpretive errors are inadvertently transcribed by the computer software. Please disregard these errors. Please excuse any errors that have escaped final proofreading.

## 2023-05-20 RX ORDER — BUPROPION HYDROCHLORIDE 75 MG/1
75 TABLET ORAL 2 TIMES DAILY
COMMUNITY

## 2023-05-20 RX ORDER — ONDANSETRON 4 MG/1
4 TABLET, FILM COATED ORAL EVERY 8 HOURS PRN
COMMUNITY
Start: 2023-04-17

## 2023-05-20 RX ORDER — LISINOPRIL 2.5 MG/1
2.5 TABLET ORAL DAILY
COMMUNITY

## 2023-05-20 RX ORDER — OMEPRAZOLE 20 MG/1
20 CAPSULE, DELAYED RELEASE ORAL DAILY PRN
COMMUNITY

## 2023-05-20 RX ORDER — ESCITALOPRAM OXALATE 5 MG/1
5 TABLET ORAL DAILY
COMMUNITY

## 2023-06-05 ENCOUNTER — HOSPITAL ENCOUNTER (EMERGENCY)
Facility: HOSPITAL | Age: 48
Discharge: HOME | End: 2023-06-05
Payer: COMMERCIAL

## 2023-06-05 VITALS
BODY MASS INDEX: 23 KG/M2 | RESPIRATION RATE: 18 BRPM | TEMPERATURE: 98.1 F | HEART RATE: 78 BPM | HEIGHT: 62 IN | DIASTOLIC BLOOD PRESSURE: 86 MMHG | OXYGEN SATURATION: 98 % | SYSTOLIC BLOOD PRESSURE: 145 MMHG | WEIGHT: 125 LBS

## 2023-06-05 DIAGNOSIS — R42 DIZZINESS: Primary | ICD-10-CM

## 2023-06-05 DIAGNOSIS — R42 VERTIGO: ICD-10-CM

## 2023-06-05 DIAGNOSIS — R73.9 HYPERGLYCEMIA: ICD-10-CM

## 2023-06-05 DIAGNOSIS — E86.0 DEHYDRATION: ICD-10-CM

## 2023-06-05 LAB
ALBUMIN SERPL-MCNC: 3.7 G/DL (ref 3.5–5)
ALBUMIN/GLOB SERPL: 0.9 (ref 1.1–2.2)
ALP SERPL-CCNC: 139 U/L (ref 45–117)
ALT SERPL-CCNC: 59 U/L (ref 12–78)
ANION GAP SERPL CALC-SCNC: 3 MMOL/L (ref 5–15)
APPEARANCE UR: CLEAR
AST SERPL-CCNC: 44 U/L (ref 15–37)
BACTERIA URNS QL MICRO: NEGATIVE /HPF
BASOPHILS # BLD: 0 K/UL (ref 0–0.1)
BASOPHILS NFR BLD: 1 % (ref 0–1)
BILIRUB SERPL-MCNC: 0.3 MG/DL (ref 0.2–1)
BILIRUB UR QL: NEGATIVE
BUN SERPL-MCNC: 14 MG/DL (ref 6–20)
BUN/CREAT SERPL: 14 (ref 12–20)
CALCIUM SERPL-MCNC: 9.1 MG/DL (ref 8.5–10.1)
CHLORIDE SERPL-SCNC: 103 MMOL/L (ref 97–108)
CO2 SERPL-SCNC: 29 MMOL/L (ref 21–32)
COLOR UR: ABNORMAL
COMMENT:: NORMAL
COMMENT:: NORMAL
CREAT SERPL-MCNC: 0.98 MG/DL (ref 0.55–1.02)
DIFFERENTIAL METHOD BLD: NORMAL
EKG ATRIAL RATE: 84 BPM
EKG DIAGNOSIS: NORMAL
EKG P AXIS: 63 DEGREES
EKG P-R INTERVAL: 148 MS
EKG Q-T INTERVAL: 338 MS
EKG QRS DURATION: 62 MS
EKG QTC CALCULATION (BAZETT): 399 MS
EKG R AXIS: -14 DEGREES
EKG T AXIS: 23 DEGREES
EKG VENTRICULAR RATE: 84 BPM
EOSINOPHIL # BLD: 0 K/UL (ref 0–0.4)
EOSINOPHIL NFR BLD: 1 % (ref 0–7)
EPITH CASTS URNS QL MICRO: ABNORMAL /LPF
ERYTHROCYTE [DISTWIDTH] IN BLOOD BY AUTOMATED COUNT: 12.2 % (ref 11.5–14.5)
GLOBULIN SER CALC-MCNC: 4.3 G/DL (ref 2–4)
GLUCOSE SERPL-MCNC: 346 MG/DL (ref 65–100)
GLUCOSE UR STRIP.AUTO-MCNC: >1000 MG/DL
HCT VFR BLD AUTO: 40.6 % (ref 35–47)
HGB BLD-MCNC: 12.7 G/DL (ref 11.5–16)
HGB UR QL STRIP: NEGATIVE
HYALINE CASTS URNS QL MICRO: ABNORMAL /LPF (ref 0–2)
IMM GRANULOCYTES # BLD AUTO: 0 K/UL (ref 0–0.04)
IMM GRANULOCYTES NFR BLD AUTO: 0 % (ref 0–0.5)
KETONES UR QL STRIP.AUTO: NEGATIVE MG/DL
LEUKOCYTE ESTERASE UR QL STRIP.AUTO: NEGATIVE
LYMPHOCYTES # BLD: 1.3 K/UL (ref 0.8–3.5)
LYMPHOCYTES NFR BLD: 21 % (ref 12–49)
MCH RBC QN AUTO: 26.5 PG (ref 26–34)
MCHC RBC AUTO-ENTMCNC: 31.3 G/DL (ref 30–36.5)
MCV RBC AUTO: 84.6 FL (ref 80–99)
MONOCYTES # BLD: 0.4 K/UL (ref 0–1)
MONOCYTES NFR BLD: 6 % (ref 5–13)
NEUTS SEG # BLD: 4.5 K/UL (ref 1.8–8)
NEUTS SEG NFR BLD: 71 % (ref 32–75)
NITRITE UR QL STRIP.AUTO: NEGATIVE
NRBC # BLD: 0 K/UL (ref 0–0.01)
NRBC BLD-RTO: 0 PER 100 WBC
PH UR STRIP: 7 (ref 5–8)
PLATELET # BLD AUTO: 216 K/UL (ref 150–400)
PMV BLD AUTO: 11.2 FL (ref 8.9–12.9)
POTASSIUM SERPL-SCNC: 4 MMOL/L (ref 3.5–5.1)
PROT SERPL-MCNC: 8 G/DL (ref 6.4–8.2)
PROT UR STRIP-MCNC: NEGATIVE MG/DL
RBC # BLD AUTO: 4.8 M/UL (ref 3.8–5.2)
RBC #/AREA URNS HPF: ABNORMAL /HPF (ref 0–5)
SODIUM SERPL-SCNC: 135 MMOL/L (ref 136–145)
SP GR UR REFRACTOMETRY: >1.03 (ref 1–1.03)
SPECIMEN HOLD: NORMAL
UROBILINOGEN UR QL STRIP.AUTO: 1 EU/DL (ref 0.2–1)
WBC # BLD AUTO: 6.3 K/UL (ref 3.6–11)
WBC URNS QL MICRO: ABNORMAL /HPF (ref 0–4)

## 2023-06-05 PROCEDURE — 96360 HYDRATION IV INFUSION INIT: CPT

## 2023-06-05 PROCEDURE — 85025 COMPLETE CBC W/AUTO DIFF WBC: CPT

## 2023-06-05 PROCEDURE — 81001 URINALYSIS AUTO W/SCOPE: CPT

## 2023-06-05 PROCEDURE — 93010 ELECTROCARDIOGRAM REPORT: CPT | Performed by: SPECIALIST

## 2023-06-05 PROCEDURE — 99284 EMERGENCY DEPT VISIT MOD MDM: CPT

## 2023-06-05 PROCEDURE — 93005 ELECTROCARDIOGRAM TRACING: CPT | Performed by: NURSE PRACTITIONER

## 2023-06-05 PROCEDURE — 2580000003 HC RX 258: Performed by: NURSE PRACTITIONER

## 2023-06-05 PROCEDURE — 80053 COMPREHEN METABOLIC PANEL: CPT

## 2023-06-05 PROCEDURE — 36415 COLL VENOUS BLD VENIPUNCTURE: CPT

## 2023-06-05 RX ORDER — MECLIZINE HYDROCHLORIDE 25 MG/1
25 TABLET ORAL 3 TIMES DAILY PRN
Qty: 21 TABLET | Refills: 0 | Status: SHIPPED | OUTPATIENT
Start: 2023-06-05 | End: 2023-06-12

## 2023-06-05 RX ORDER — 0.9 % SODIUM CHLORIDE 0.9 %
1000 INTRAVENOUS SOLUTION INTRAVENOUS ONCE
Status: COMPLETED | OUTPATIENT
Start: 2023-06-05 | End: 2023-06-05

## 2023-06-05 RX ADMIN — SODIUM CHLORIDE 1000 ML: 9 INJECTION, SOLUTION INTRAVENOUS at 12:46

## 2023-06-05 ASSESSMENT — ENCOUNTER SYMPTOMS
APNEA: 0
CONSTIPATION: 0
ABDOMINAL DISTENTION: 0
COLOR CHANGE: 0
DIARRHEA: 0
COUGH: 0
VOMITING: 0
CHOKING: 0
CHEST TIGHTNESS: 0
ABDOMINAL PAIN: 0
NAUSEA: 0
EYES NEGATIVE: 1
SHORTNESS OF BREATH: 0

## 2023-06-05 ASSESSMENT — PAIN - FUNCTIONAL ASSESSMENT: PAIN_FUNCTIONAL_ASSESSMENT: NONE - DENIES PAIN

## 2023-06-05 NOTE — ED NOTES
Patient does not appear to be in any acute distress/shows no evidence of clinical instability at this time. Provider has reviewed discharge instructions with the patient/family. The patient/family verbalized understanding instructions as well as need for follow up for any further symptoms. Discharge papers given, education provided, and any questions answered. Patient discharged by provider.        Lilton Nissen, RN  06/05/23 4245

## 2023-06-05 NOTE — ED PROVIDER NOTES
[facility] EMERGENCY DEPT  EMERGENCY DEPARTMENT ENCOUNTER      Pt Name: Nikolay Faust  MRN: [id number]  Karelgfrobbie 1975  Date of evaluation: 6/5/2023  Provider: MIRI Saldana NP    [address] [facility]       Chief Complaint   Patient presents with    Dizziness         HISTORY OF PRESENT ILLNESS   (Location/Symptom, Timing/Onset, Context/Setting, Quality, Duration, Modifying Factors, Severity)  Note limiting factors. HPI 66-year-old female who came in with 1 episode of dizziness at work she states the room was spinning. She also states they took her blood pressure and it was elevated therefore she came to the emergency room she is a type I diabetic. She denies chest pain, shortness of breath, nausea, vomiting, difficulty with urination or bowel movements. Review of External Medical Records:     Nursing Notes were reviewed. REVIEW OF SYSTEMS    (2-9 systems for level 4, 10 or more for level 5)     Review of Systems   Constitutional:  Negative for activity change, appetite change, diaphoresis and fatigue. HENT: Negative. Eyes: Negative. Respiratory:  Negative for apnea, cough, choking, chest tightness and shortness of breath. Cardiovascular:  Negative for chest pain and leg swelling. Gastrointestinal:  Negative for abdominal distention, abdominal pain, constipation, diarrhea, nausea and vomiting. Genitourinary:  Negative for difficulty urinating, dysuria, frequency and urgency. Musculoskeletal:  Negative for arthralgias, gait problem and neck pain. Skin:  Negative for color change, pallor and rash. Neurological:  Positive for dizziness. Negative for facial asymmetry, weakness, light-headedness and headaches. Psychiatric/Behavioral:  Negative for agitation, behavioral problems and confusion. Except as noted above the remainder of the review of systems was reviewed and negative.        PAST MEDICAL HISTORY     Past Medical History:   Diagnosis Date    Anemia

## 2024-03-04 ENCOUNTER — APPOINTMENT (OUTPATIENT)
Facility: HOSPITAL | Age: 49
End: 2024-03-04
Payer: COMMERCIAL

## 2024-03-04 ENCOUNTER — HOSPITAL ENCOUNTER (EMERGENCY)
Facility: HOSPITAL | Age: 49
Discharge: HOME | End: 2024-03-04
Attending: EMERGENCY MEDICINE
Payer: COMMERCIAL

## 2024-03-04 VITALS
SYSTOLIC BLOOD PRESSURE: 139 MMHG | BODY MASS INDEX: 23.55 KG/M2 | WEIGHT: 128 LBS | DIASTOLIC BLOOD PRESSURE: 72 MMHG | HEIGHT: 62 IN | OXYGEN SATURATION: 97 % | TEMPERATURE: 98 F | RESPIRATION RATE: 18 BRPM | HEART RATE: 99 BPM

## 2024-03-04 DIAGNOSIS — E10.65 HYPERGLYCEMIA DUE TO TYPE 1 DIABETES MELLITUS (HCC): Primary | ICD-10-CM

## 2024-03-04 DIAGNOSIS — J92.9 PLEURAL PLAQUE: ICD-10-CM

## 2024-03-04 LAB
ALBUMIN SERPL-MCNC: 3.4 G/DL (ref 3.5–5)
ALBUMIN/GLOB SERPL: 0.9 (ref 1.1–2.2)
ALP SERPL-CCNC: 155 U/L (ref 45–117)
ALT SERPL-CCNC: 40 U/L (ref 12–78)
ANION GAP SERPL CALC-SCNC: 7 MMOL/L (ref 5–15)
AST SERPL-CCNC: 25 U/L (ref 15–37)
B-OH-BUTYR SERPL-SCNC: 0.1 MMOL/L
BASOPHILS # BLD: 0 K/UL (ref 0–0.1)
BASOPHILS NFR BLD: 0 % (ref 0–1)
BILIRUB SERPL-MCNC: 0.2 MG/DL (ref 0.2–1)
BUN SERPL-MCNC: 16 MG/DL (ref 6–20)
BUN/CREAT SERPL: 19 (ref 12–20)
CALCIUM SERPL-MCNC: 9.1 MG/DL (ref 8.5–10.1)
CHLORIDE SERPL-SCNC: 105 MMOL/L (ref 97–108)
CO2 SERPL-SCNC: 27 MMOL/L (ref 21–32)
CREAT SERPL-MCNC: 0.85 MG/DL (ref 0.55–1.02)
DIFFERENTIAL METHOD BLD: NORMAL
EOSINOPHIL # BLD: 0.1 K/UL (ref 0–0.4)
EOSINOPHIL NFR BLD: 2 % (ref 0–7)
ERYTHROCYTE [DISTWIDTH] IN BLOOD BY AUTOMATED COUNT: 12.9 % (ref 11.5–14.5)
GLOBULIN SER CALC-MCNC: 4 G/DL (ref 2–4)
GLUCOSE BLD STRIP.AUTO-MCNC: 288 MG/DL (ref 65–117)
GLUCOSE SERPL-MCNC: 317 MG/DL (ref 65–100)
HCT VFR BLD AUTO: 38 % (ref 35–47)
HGB BLD-MCNC: 12.1 G/DL (ref 11.5–16)
IMM GRANULOCYTES # BLD AUTO: 0 K/UL (ref 0–0.04)
IMM GRANULOCYTES NFR BLD AUTO: 0 % (ref 0–0.5)
LYMPHOCYTES # BLD: 1.6 K/UL (ref 0.8–3.5)
LYMPHOCYTES NFR BLD: 25 % (ref 12–49)
MCH RBC QN AUTO: 26.7 PG (ref 26–34)
MCHC RBC AUTO-ENTMCNC: 31.8 G/DL (ref 30–36.5)
MCV RBC AUTO: 83.9 FL (ref 80–99)
MONOCYTES # BLD: 0.5 K/UL (ref 0–1)
MONOCYTES NFR BLD: 7 % (ref 5–13)
NEUTS SEG # BLD: 4.2 K/UL (ref 1.8–8)
NEUTS SEG NFR BLD: 66 % (ref 32–75)
NRBC # BLD: 0 K/UL (ref 0–0.01)
NRBC BLD-RTO: 0 PER 100 WBC
PLATELET # BLD AUTO: 242 K/UL (ref 150–400)
PMV BLD AUTO: 10.6 FL (ref 8.9–12.9)
POTASSIUM SERPL-SCNC: 4 MMOL/L (ref 3.5–5.1)
PROT SERPL-MCNC: 7.4 G/DL (ref 6.4–8.2)
RBC # BLD AUTO: 4.53 M/UL (ref 3.8–5.2)
SERVICE CMNT-IMP: ABNORMAL
SODIUM SERPL-SCNC: 139 MMOL/L (ref 136–145)
WBC # BLD AUTO: 6.3 K/UL (ref 3.6–11)

## 2024-03-04 PROCEDURE — 82962 GLUCOSE BLOOD TEST: CPT

## 2024-03-04 PROCEDURE — 71046 X-RAY EXAM CHEST 2 VIEWS: CPT

## 2024-03-04 PROCEDURE — 80053 COMPREHEN METABOLIC PANEL: CPT

## 2024-03-04 PROCEDURE — 36415 COLL VENOUS BLD VENIPUNCTURE: CPT

## 2024-03-04 PROCEDURE — 82010 KETONE BODYS QUAN: CPT

## 2024-03-04 PROCEDURE — 99284 EMERGENCY DEPT VISIT MOD MDM: CPT

## 2024-03-04 PROCEDURE — 85025 COMPLETE CBC W/AUTO DIFF WBC: CPT

## 2024-03-04 RX ORDER — 0.9 % SODIUM CHLORIDE 0.9 %
1000 INTRAVENOUS SOLUTION INTRAVENOUS ONCE
Status: DISCONTINUED | OUTPATIENT
Start: 2024-03-04 | End: 2024-03-04

## 2024-03-04 ASSESSMENT — ENCOUNTER SYMPTOMS
BACK PAIN: 0
COUGH: 1
ANAL BLEEDING: 0
VOMITING: 0
SORE THROAT: 0
ABDOMINAL DISTENTION: 0
NAUSEA: 0
DIARRHEA: 0
COLOR CHANGE: 0
CONSTIPATION: 0
BLOOD IN STOOL: 0
SHORTNESS OF BREATH: 0
ABDOMINAL PAIN: 0

## 2024-03-04 ASSESSMENT — LIFESTYLE VARIABLES
HOW MANY STANDARD DRINKS CONTAINING ALCOHOL DO YOU HAVE ON A TYPICAL DAY: PATIENT DOES NOT DRINK
HOW OFTEN DO YOU HAVE A DRINK CONTAINING ALCOHOL: NEVER

## 2024-03-04 ASSESSMENT — PAIN - FUNCTIONAL ASSESSMENT: PAIN_FUNCTIONAL_ASSESSMENT: 0-10

## 2024-03-04 ASSESSMENT — PAIN SCALES - GENERAL: PAINLEVEL_OUTOF10: 0

## 2024-03-05 NOTE — DISCHARGE INSTRUCTIONS
Watch her diet closely and ensure you are tracking your blood sugar levels.  Please follow-up with your primary care physician or endocrinologist.  If you develop new or worsening symptoms please return to the ER.

## 2024-03-05 NOTE — ED TRIAGE NOTES
Patient here with concern of increase blood glucose reading in the 300s.   Patient states she is a type 1 diabetic and drinks a lot of sodas.   Patient denies any excessive thiry, urination or eating

## 2024-03-05 NOTE — ED PROVIDER NOTES
[facility] EMERGENCY DEPT  EMERGENCY DEPARTMENT ENCOUNTER      Pt Name: Ava Hollis  MRN: [id number]  Birthdate 1975  Date of evaluation: 3/4/2024  Provider: MARANDA Oscar    CHIEF COMPLAINT       Chief Complaint   Patient presents with    Hyperglycemia         HISTORY OF PRESENT ILLNESS    Patient is a 48-year-old female with a history of type 1 diabetes, anemia, GERD, MVP, endometriosis who presents to ED complaining of hyperglycemia.  Patient reports she has had elevated BGLs in 300-400 range the past few days.  States this is atypical of her because she has been trying not to drink soda and eat healthy.  States she tried drinking a large amount of water today and continued to have hyperglycemia.  She reports cough and upper respiratory symptoms for the past few weeks.  States she tried to take over-the-counter medications without improvement of symptoms.  She denies any fever, chills, chest pain, shortness of breath, abdominal pain, N/V/D, urinary symptoms. Denies any polydipsia, polyuria.             Review of External Medical Records:     Nursing Notes were reviewed.    REVIEW OF SYSTEMS       Review of Systems   Constitutional:  Negative for activity change, appetite change, chills and fever.   HENT:  Negative for congestion and sore throat.    Respiratory:  Positive for cough. Negative for shortness of breath.    Cardiovascular:  Negative for chest pain, palpitations and leg swelling.   Gastrointestinal:  Negative for abdominal distention, abdominal pain, anal bleeding, blood in stool, constipation, diarrhea, nausea and vomiting.   Endocrine: Negative for polydipsia, polyphagia and polyuria.   Genitourinary:  Negative for decreased urine volume, difficulty urinating, dysuria, frequency and urgency.   Musculoskeletal:  Negative for back pain and neck pain.   Skin:  Negative for color change and wound.   Neurological:  Negative for weakness, numbness and headaches.   All other systems

## 2024-03-11 ENCOUNTER — HOSPITAL ENCOUNTER (EMERGENCY)
Facility: HOSPITAL | Age: 49
Discharge: HOME | End: 2024-03-11
Attending: EMERGENCY MEDICINE
Payer: COMMERCIAL

## 2024-03-11 VITALS
RESPIRATION RATE: 18 BRPM | TEMPERATURE: 97.3 F | HEART RATE: 92 BPM | BODY MASS INDEX: 22.04 KG/M2 | SYSTOLIC BLOOD PRESSURE: 168 MMHG | WEIGHT: 129.1 LBS | DIASTOLIC BLOOD PRESSURE: 103 MMHG | OXYGEN SATURATION: 100 % | HEIGHT: 64 IN

## 2024-03-11 DIAGNOSIS — S90.852A FOREIGN BODY IN FOOT, LEFT, INITIAL ENCOUNTER: Primary | ICD-10-CM

## 2024-03-11 PROCEDURE — 6360000002 HC RX W HCPCS: Performed by: EMERGENCY MEDICINE

## 2024-03-11 PROCEDURE — 90714 TD VACC NO PRESV 7 YRS+ IM: CPT | Performed by: EMERGENCY MEDICINE

## 2024-03-11 PROCEDURE — 90471 IMMUNIZATION ADMIN: CPT | Performed by: EMERGENCY MEDICINE

## 2024-03-11 PROCEDURE — 10120 INC&RMVL FB SUBQ TISS SMPL: CPT

## 2024-03-11 PROCEDURE — 2500000003 HC RX 250 WO HCPCS: Performed by: EMERGENCY MEDICINE

## 2024-03-11 PROCEDURE — 99284 EMERGENCY DEPT VISIT MOD MDM: CPT

## 2024-03-11 PROCEDURE — 6370000000 HC RX 637 (ALT 250 FOR IP): Performed by: EMERGENCY MEDICINE

## 2024-03-11 RX ORDER — LIDOCAINE HYDROCHLORIDE 10 MG/ML
1 INJECTION, SOLUTION EPIDURAL; INFILTRATION; INTRACAUDAL; PERINEURAL
Status: COMPLETED | OUTPATIENT
Start: 2024-03-11 | End: 2024-03-11

## 2024-03-11 RX ORDER — BACITRACIN ZINC 500 [USP'U]/G
OINTMENT TOPICAL
Status: COMPLETED | OUTPATIENT
Start: 2024-03-11 | End: 2024-03-11

## 2024-03-11 RX ADMIN — LIDOCAINE HYDROCHLORIDE 1 ML: 10 INJECTION, SOLUTION EPIDURAL; INFILTRATION; INTRACAUDAL; PERINEURAL at 23:18

## 2024-03-11 RX ADMIN — BACITRACIN ZINC: 500 OINTMENT TOPICAL at 23:33

## 2024-03-11 RX ADMIN — CLOSTRIDIUM TETANI TOXOID ANTIGEN (FORMALDEHYDE INACTIVATED) AND CORYNEBACTERIUM DIPHTHERIAE TOXOID ANTIGEN (FORMALDEHYDE INACTIVATED) 0.5 ML: 5; 2 INJECTION, SUSPENSION INTRAMUSCULAR at 23:33

## 2024-03-11 ASSESSMENT — PAIN SCALES - GENERAL: PAINLEVEL_OUTOF10: 5

## 2024-03-11 ASSESSMENT — PAIN - FUNCTIONAL ASSESSMENT: PAIN_FUNCTIONAL_ASSESSMENT: 0-10

## 2024-03-11 ASSESSMENT — LIFESTYLE VARIABLES
HOW OFTEN DO YOU HAVE A DRINK CONTAINING ALCOHOL: NEVER
HOW MANY STANDARD DRINKS CONTAINING ALCOHOL DO YOU HAVE ON A TYPICAL DAY: PATIENT DOES NOT DRINK

## 2024-03-12 NOTE — ED NOTES
Patient stable at time of discharge. Tetanus/dip vaccine administered without issue, patient given VIS for same prior to administration- denied any questions. Education and discharge paperwork is reviewed with patient who verbalizes understanding of same. Patient ambulates from ED with belongings.   
mechanism: 11 blade scalpel.    Foreign bodies recovered:  1    Description:  Tiny wooden splinter    Intact foreign body removal: yes    Post-procedure details:     Confirmation:  No additional foreign bodies on visualization    Skin closure:  None    Dressing:  Antibiotic ointment    Procedure completion:  Tolerated          FINAL IMPRESSION      1. Foreign body in foot, left, initial encounter          DISPOSITION/PLAN   DISPOSITION Decision To Discharge 03/11/2024 11:28:11 PM      PATIENT REFERRED TO:  No follow-up provider specified.    DISCHARGE MEDICATIONS:  Discharge Medication List as of 3/11/2024 11:33 PM        Controlled Substances Monitoring:          No data to display                (Please note that portions of this note were completed with a voice recognition program.  Efforts were made to edit the dictations but occasionally words are mis-transcribed.)    Paulo Sanford MD (electronically signed)  Attending Emergency Physician           Juvenal, Paulo SIGALA MD  03/12/24 7045

## 2024-03-12 NOTE — ED TRIAGE NOTES
Pt states something stuck in her Left foot. Believes it happened on Saturday. Blood glucose 220 at last check before dinner

## 2024-04-13 ENCOUNTER — HOSPITAL ENCOUNTER (EMERGENCY)
Facility: HOSPITAL | Age: 49
Discharge: HOME | End: 2024-04-13
Attending: STUDENT IN AN ORGANIZED HEALTH CARE EDUCATION/TRAINING PROGRAM
Payer: COMMERCIAL

## 2024-04-13 VITALS
OXYGEN SATURATION: 97 % | HEIGHT: 64 IN | BODY MASS INDEX: 21.34 KG/M2 | RESPIRATION RATE: 18 BRPM | TEMPERATURE: 98.1 F | SYSTOLIC BLOOD PRESSURE: 139 MMHG | WEIGHT: 125 LBS | HEART RATE: 92 BPM | DIASTOLIC BLOOD PRESSURE: 90 MMHG

## 2024-04-13 DIAGNOSIS — R03.0 ELEVATED BLOOD PRESSURE READING IN OFFICE WITHOUT DIAGNOSIS OF HYPERTENSION: Primary | ICD-10-CM

## 2024-04-13 LAB
ALBUMIN SERPL-MCNC: 3.6 G/DL (ref 3.5–5)
ALBUMIN/GLOB SERPL: 0.8 (ref 1.1–2.2)
ALP SERPL-CCNC: 138 U/L (ref 45–117)
ALT SERPL-CCNC: 46 U/L (ref 12–78)
ANION GAP SERPL CALC-SCNC: 3 MMOL/L (ref 5–15)
AST SERPL-CCNC: 26 U/L (ref 15–37)
BASOPHILS # BLD: 0 K/UL (ref 0–0.1)
BASOPHILS NFR BLD: 0 % (ref 0–1)
BILIRUB SERPL-MCNC: 0.2 MG/DL (ref 0.2–1)
BUN SERPL-MCNC: 14 MG/DL (ref 6–20)
BUN/CREAT SERPL: 17 (ref 12–20)
CALCIUM SERPL-MCNC: 9.5 MG/DL (ref 8.5–10.1)
CHLORIDE SERPL-SCNC: 107 MMOL/L (ref 97–108)
CO2 SERPL-SCNC: 30 MMOL/L (ref 21–32)
COMMENT:: NORMAL
CREAT SERPL-MCNC: 0.83 MG/DL (ref 0.55–1.02)
DIFFERENTIAL METHOD BLD: NORMAL
EOSINOPHIL # BLD: 0.1 K/UL (ref 0–0.4)
EOSINOPHIL NFR BLD: 2 % (ref 0–7)
ERYTHROCYTE [DISTWIDTH] IN BLOOD BY AUTOMATED COUNT: 12.9 % (ref 11.5–14.5)
GLOBULIN SER CALC-MCNC: 4.3 G/DL (ref 2–4)
GLUCOSE SERPL-MCNC: 87 MG/DL (ref 65–100)
HCT VFR BLD AUTO: 38 % (ref 35–47)
HGB BLD-MCNC: 12.2 G/DL (ref 11.5–16)
IMM GRANULOCYTES # BLD AUTO: 0 K/UL (ref 0–0.04)
IMM GRANULOCYTES NFR BLD AUTO: 0 % (ref 0–0.5)
LYMPHOCYTES # BLD: 2.1 K/UL (ref 0.8–3.5)
LYMPHOCYTES NFR BLD: 30 % (ref 12–49)
MCH RBC QN AUTO: 26.6 PG (ref 26–34)
MCHC RBC AUTO-ENTMCNC: 32.1 G/DL (ref 30–36.5)
MCV RBC AUTO: 83 FL (ref 80–99)
MONOCYTES # BLD: 0.6 K/UL (ref 0–1)
MONOCYTES NFR BLD: 8 % (ref 5–13)
NEUTS SEG # BLD: 4.1 K/UL (ref 1.8–8)
NEUTS SEG NFR BLD: 60 % (ref 32–75)
NRBC # BLD: 0 K/UL (ref 0–0.01)
NRBC BLD-RTO: 0 PER 100 WBC
PLATELET # BLD AUTO: 245 K/UL (ref 150–400)
PMV BLD AUTO: 11 FL (ref 8.9–12.9)
POTASSIUM SERPL-SCNC: 3.3 MMOL/L (ref 3.5–5.1)
PROT SERPL-MCNC: 7.9 G/DL (ref 6.4–8.2)
RBC # BLD AUTO: 4.58 M/UL (ref 3.8–5.2)
SODIUM SERPL-SCNC: 140 MMOL/L (ref 136–145)
SPECIMEN HOLD: NORMAL
TROPONIN I SERPL HS-MCNC: <4 NG/L (ref 0–51)
WBC # BLD AUTO: 6.9 K/UL (ref 3.6–11)

## 2024-04-13 PROCEDURE — 93005 ELECTROCARDIOGRAM TRACING: CPT | Performed by: STUDENT IN AN ORGANIZED HEALTH CARE EDUCATION/TRAINING PROGRAM

## 2024-04-13 PROCEDURE — 99284 EMERGENCY DEPT VISIT MOD MDM: CPT

## 2024-04-13 PROCEDURE — 36415 COLL VENOUS BLD VENIPUNCTURE: CPT

## 2024-04-13 PROCEDURE — 85025 COMPLETE CBC W/AUTO DIFF WBC: CPT

## 2024-04-13 PROCEDURE — 80053 COMPREHEN METABOLIC PANEL: CPT

## 2024-04-13 PROCEDURE — 84484 ASSAY OF TROPONIN QUANT: CPT

## 2024-04-13 ASSESSMENT — PAIN - FUNCTIONAL ASSESSMENT: PAIN_FUNCTIONAL_ASSESSMENT: NONE - DENIES PAIN

## 2024-04-13 NOTE — ED TRIAGE NOTES
Patient is a 48 year old female complaining of hypertension. Patient took her blood pressure today and it was 140/100. Presenting BP is 158/87. Patient denies chest pain, shortness of breath, abdominal pain, and back pain. Patient has hx of HTN, takes lisinopril. Patient reports taking it today. Patient asymptomatic but wants to make sure she's okay. Patient alert and oriented x4, in NAD.

## 2024-04-13 NOTE — ED PROVIDER NOTES
[facility] EMERGENCY DEPT  EMERGENCY DEPARTMENT ENCOUNTER      Pt Name: Ava Hollis  MRN: [id number]  Birthdate 1975  Date of evaluation: 4/13/2024  Provider: Natalia Jaffe PA-C    CHIEF COMPLAINT       Chief Complaint   Patient presents with    Hypertension         HISTORY OF PRESENT ILLNESS   (Location/Symptom, Timing/Onset, Context/Setting, Quality, Duration, Modifying Factors, Severity)  Note limiting factors.   Patient is a 48-year-old female presents emergency department complaining of elevated blood pressure.  The patient has a significant past medical history for diabetes mellitus, which is poorly controlled, she states her last A1c is 14.  Her blood sugars been running high for some time.  This week, it has been consistent with the reading she has had recently.  However, the patient states she does not have a diagnosis of hypertension, she is taking lisinopril for renal protection due to her diabetes diagnosis.  She monitors her blood pressure with a wrist cuff.  And noted that her blood pressure has run high for the past week, with the diastolic blood pressure in the 100 range.  During the week, she has had intermittent elevation of the blood pressure, but today, it has been higher than typical, and she had some tingling in her forearms bilaterally, and across the back of her neck.  She denies any chest pain, shortness of breath, difficulty breathing, wheezing.  She denies any weakness or numbness.  Currently, she is asymptomatic, and the tingling in the forearms, and posterior neck have resolved.    The history is provided by the patient.         Review of External Medical Records:     Nursing Notes were reviewed.    REVIEW OF SYSTEMS    (2-9 systems for level 4, 10 or more for level 5)     Review of Systems    Except as noted above the remainder of the review of systems was reviewed and negative.       PAST MEDICAL HISTORY     Past Medical History:   Diagnosis Date    Anemia     Anemia   Nurse Practitioner            Natalia Jaffe PA-C  04/15/24 4890

## 2024-04-14 LAB
EKG ATRIAL RATE: 101 BPM
EKG DIAGNOSIS: NORMAL
EKG P AXIS: 60 DEGREES
EKG P-R INTERVAL: 144 MS
EKG Q-T INTERVAL: 332 MS
EKG QRS DURATION: 64 MS
EKG QTC CALCULATION (BAZETT): 430 MS
EKG R AXIS: 41 DEGREES
EKG T AXIS: -1 DEGREES
EKG VENTRICULAR RATE: 101 BPM

## 2024-04-14 PROCEDURE — 93010 ELECTROCARDIOGRAM REPORT: CPT | Performed by: SPECIALIST

## 2024-05-20 ENCOUNTER — HOSPITAL ENCOUNTER (EMERGENCY)
Facility: HOSPITAL | Age: 49
Discharge: HOME | End: 2024-05-21
Attending: STUDENT IN AN ORGANIZED HEALTH CARE EDUCATION/TRAINING PROGRAM
Payer: COMMERCIAL

## 2024-05-20 DIAGNOSIS — M79.605 LEG PAIN, BILATERAL: ICD-10-CM

## 2024-05-20 DIAGNOSIS — M79.604 LEG PAIN, BILATERAL: ICD-10-CM

## 2024-05-20 DIAGNOSIS — R73.9 HYPERGLYCEMIA: Primary | ICD-10-CM

## 2024-05-20 LAB
GLUCOSE BLD STRIP.AUTO-MCNC: 380 MG/DL (ref 65–117)
SERVICE CMNT-IMP: ABNORMAL

## 2024-05-20 PROCEDURE — 99284 EMERGENCY DEPT VISIT MOD MDM: CPT

## 2024-05-20 PROCEDURE — 82962 GLUCOSE BLOOD TEST: CPT

## 2024-05-20 PROCEDURE — 81001 URINALYSIS AUTO W/SCOPE: CPT

## 2024-05-20 PROCEDURE — 85025 COMPLETE CBC W/AUTO DIFF WBC: CPT

## 2024-05-20 PROCEDURE — 2580000003 HC RX 258: Performed by: STUDENT IN AN ORGANIZED HEALTH CARE EDUCATION/TRAINING PROGRAM

## 2024-05-20 PROCEDURE — 80053 COMPREHEN METABOLIC PANEL: CPT

## 2024-05-20 RX ORDER — 0.9 % SODIUM CHLORIDE 0.9 %
1000 INTRAVENOUS SOLUTION INTRAVENOUS ONCE
Status: COMPLETED | OUTPATIENT
Start: 2024-05-20 | End: 2024-05-21

## 2024-05-20 RX ADMIN — SODIUM CHLORIDE 1000 ML: 9 INJECTION, SOLUTION INTRAVENOUS at 23:53

## 2024-05-20 ASSESSMENT — PAIN SCALES - GENERAL: PAINLEVEL_OUTOF10: 7

## 2024-05-20 ASSESSMENT — PAIN DESCRIPTION - ORIENTATION: ORIENTATION: RIGHT;LEFT

## 2024-05-20 ASSESSMENT — PAIN DESCRIPTION - LOCATION: LOCATION: LEG

## 2024-05-20 ASSESSMENT — PAIN - FUNCTIONAL ASSESSMENT: PAIN_FUNCTIONAL_ASSESSMENT: 0-10

## 2024-05-20 ASSESSMENT — PAIN DESCRIPTION - DESCRIPTORS: DESCRIPTORS: ACHING

## 2024-05-21 VITALS
HEART RATE: 90 BPM | RESPIRATION RATE: 18 BRPM | TEMPERATURE: 98.4 F | SYSTOLIC BLOOD PRESSURE: 139 MMHG | DIASTOLIC BLOOD PRESSURE: 86 MMHG | HEIGHT: 62 IN | BODY MASS INDEX: 23.9 KG/M2 | WEIGHT: 129.85 LBS | OXYGEN SATURATION: 97 %

## 2024-05-21 LAB
ALBUMIN SERPL-MCNC: 3.6 G/DL (ref 3.5–5)
ALBUMIN/GLOB SERPL: 0.8 (ref 1.1–2.2)
ALP SERPL-CCNC: 206 U/L (ref 45–117)
ALT SERPL-CCNC: 37 U/L (ref 12–78)
ANION GAP SERPL CALC-SCNC: 5 MMOL/L (ref 5–15)
APPEARANCE UR: CLEAR
AST SERPL-CCNC: 34 U/L (ref 15–37)
BACTERIA URNS QL MICRO: NEGATIVE /HPF
BASOPHILS # BLD: 0 K/UL (ref 0–0.1)
BASOPHILS NFR BLD: 0 % (ref 0–1)
BILIRUB SERPL-MCNC: 0.1 MG/DL (ref 0.2–1)
BILIRUB UR QL: NEGATIVE
BUN SERPL-MCNC: 15 MG/DL (ref 6–20)
BUN/CREAT SERPL: 18 (ref 12–20)
CALCIUM SERPL-MCNC: 9.4 MG/DL (ref 8.5–10.1)
CHLORIDE SERPL-SCNC: 102 MMOL/L (ref 97–108)
CO2 SERPL-SCNC: 27 MMOL/L (ref 21–32)
COLOR UR: YELLOW
CREAT SERPL-MCNC: 0.84 MG/DL (ref 0.55–1.02)
DIFFERENTIAL METHOD BLD: ABNORMAL
EOSINOPHIL # BLD: 0.1 K/UL (ref 0–0.4)
EOSINOPHIL NFR BLD: 1 % (ref 0–7)
EPITH CASTS URNS QL MICRO: ABNORMAL /LPF
ERYTHROCYTE [DISTWIDTH] IN BLOOD BY AUTOMATED COUNT: 12.6 % (ref 11.5–14.5)
GLOBULIN SER CALC-MCNC: 4.4 G/DL (ref 2–4)
GLUCOSE BLD STRIP.AUTO-MCNC: 232 MG/DL (ref 65–117)
GLUCOSE BLD STRIP.AUTO-MCNC: 330 MG/DL (ref 65–117)
GLUCOSE SERPL-MCNC: 430 MG/DL (ref 65–100)
GLUCOSE UR STRIP.AUTO-MCNC: >1000 MG/DL
HCT VFR BLD AUTO: 38.6 % (ref 35–47)
HGB BLD-MCNC: 12.1 G/DL (ref 11.5–16)
HGB UR QL STRIP: NEGATIVE
IMM GRANULOCYTES # BLD AUTO: 0 K/UL (ref 0–0.04)
IMM GRANULOCYTES NFR BLD AUTO: 0 % (ref 0–0.5)
KETONES UR QL STRIP.AUTO: NEGATIVE MG/DL
LEUKOCYTE ESTERASE UR QL STRIP.AUTO: NEGATIVE
LYMPHOCYTES # BLD: 1.3 K/UL (ref 0.8–3.5)
LYMPHOCYTES NFR BLD: 20 % (ref 12–49)
MCH RBC QN AUTO: 25.6 PG (ref 26–34)
MCHC RBC AUTO-ENTMCNC: 31.3 G/DL (ref 30–36.5)
MCV RBC AUTO: 81.8 FL (ref 80–99)
MONOCYTES # BLD: 0.4 K/UL (ref 0–1)
MONOCYTES NFR BLD: 6 % (ref 5–13)
NEUTS SEG # BLD: 4.9 K/UL (ref 1.8–8)
NEUTS SEG NFR BLD: 73 % (ref 32–75)
NITRITE UR QL STRIP.AUTO: NEGATIVE
NRBC # BLD: 0 K/UL (ref 0–0.01)
NRBC BLD-RTO: 0 PER 100 WBC
PH UR STRIP: 6 (ref 5–8)
PLATELET # BLD AUTO: 250 K/UL (ref 150–400)
PMV BLD AUTO: 11.3 FL (ref 8.9–12.9)
POTASSIUM SERPL-SCNC: 3.8 MMOL/L (ref 3.5–5.1)
PROT SERPL-MCNC: 8 G/DL (ref 6.4–8.2)
PROT UR STRIP-MCNC: NEGATIVE MG/DL
RBC # BLD AUTO: 4.72 M/UL (ref 3.8–5.2)
RBC #/AREA URNS HPF: ABNORMAL /HPF (ref 0–5)
SERVICE CMNT-IMP: ABNORMAL
SERVICE CMNT-IMP: ABNORMAL
SODIUM SERPL-SCNC: 134 MMOL/L (ref 136–145)
SP GR UR REFRACTOMETRY: ABNORMAL (ref 1–1.03)
SPECIMEN HOLD: NORMAL
UROBILINOGEN UR QL STRIP.AUTO: NEGATIVE EU/DL (ref 0.2–1)
WBC # BLD AUTO: 6.8 K/UL (ref 3.6–11)
WBC URNS QL MICRO: ABNORMAL /HPF (ref 0–4)

## 2024-05-21 PROCEDURE — 82962 GLUCOSE BLOOD TEST: CPT

## 2024-05-21 PROCEDURE — 2580000003 HC RX 258: Performed by: STUDENT IN AN ORGANIZED HEALTH CARE EDUCATION/TRAINING PROGRAM

## 2024-05-21 RX ORDER — 0.9 % SODIUM CHLORIDE 0.9 %
1000 INTRAVENOUS SOLUTION INTRAVENOUS ONCE
Status: COMPLETED | OUTPATIENT
Start: 2024-05-21 | End: 2024-05-21

## 2024-05-21 RX ADMIN — SODIUM CHLORIDE 1000 ML: 9 INJECTION, SOLUTION INTRAVENOUS at 02:36

## 2024-05-21 ASSESSMENT — PAIN - FUNCTIONAL ASSESSMENT: PAIN_FUNCTIONAL_ASSESSMENT: NONE - DENIES PAIN

## 2024-05-21 ASSESSMENT — PAIN SCALES - GENERAL: PAINLEVEL_OUTOF10: 0

## 2024-05-21 NOTE — ED TRIAGE NOTES
Patient arrives ambulatory to ED with complaints of bilateral leg pain described as aching since Friday, starts at her hips and radiates down     Denies injury or falls     Reports her BG has been high with a reading of 313 at home, polyuria, polydipsia

## 2024-05-21 NOTE — ED PROVIDER NOTES
limits   URINE CULTURE HOLD SAMPLE   EXTRA TUBES HOLD       All other labs were within normal range or not returned as of this dictation.    EMERGENCY DEPARTMENT COURSE and DIFFERENTIAL DIAGNOSIS/MDM:   Vitals:    Vitals:    05/20/24 2341   BP: (!) 154/98   Pulse: 91   Resp: 16   Temp: 98.4 °F (36.9 °C)   TempSrc: Oral   SpO2: 98%   Weight: 58.9 kg (129 lb 13.6 oz)   Height: 1.575 m (5' 2\")           Medical Decision Making  Hyperglycemia, DKA, diabetic neuropathy versus myalgias.  48-year-old female presenting to the ED with bilateral leg pain also found to have hyperglycemia no evidence of DKA on labs will give fluid bolus will reassess.    Amount and/or Complexity of Data Reviewed  Labs: ordered.    Risk  Prescription drug management.            REASSESSMENT      4:36 AM    On reassessment patient's blood sugar was down to 20 symptoms have resolved patient will be discharged to home.      CONSULTS:  None    PROCEDURES:  Unless otherwise noted below, none     Procedures      FINAL IMPRESSION      1. Hyperglycemia    2. Leg pain, bilateral          DISPOSITION/PLAN   DISPOSITION        PATIENT REFERRED TO:  No follow-up provider specified.    DISCHARGE MEDICATIONS:  New Prescriptions    No medications on file         (Please note that portions of this note were completed with a voice recognition program.  Efforts were made to edit the dictations but occasionally words are mis-transcribed.)    Fransico Starks MD (electronically signed)  Emergency Attending Physician / Physician Assistant / Nurse Practitioner             Fransico Starks MD  05/21/24 0331

## 2024-07-04 ENCOUNTER — HOSPITAL ENCOUNTER (EMERGENCY)
Facility: HOSPITAL | Age: 49
Discharge: HOME | End: 2024-07-04
Attending: STUDENT IN AN ORGANIZED HEALTH CARE EDUCATION/TRAINING PROGRAM
Payer: COMMERCIAL

## 2024-07-04 VITALS
SYSTOLIC BLOOD PRESSURE: 152 MMHG | HEART RATE: 99 BPM | WEIGHT: 136 LBS | DIASTOLIC BLOOD PRESSURE: 93 MMHG | BODY MASS INDEX: 25.03 KG/M2 | TEMPERATURE: 98.2 F | OXYGEN SATURATION: 96 % | RESPIRATION RATE: 17 BRPM | HEIGHT: 62 IN

## 2024-07-04 DIAGNOSIS — M79.10 MYALGIA: Primary | ICD-10-CM

## 2024-07-04 LAB
ALBUMIN SERPL-MCNC: 3.7 G/DL (ref 3.5–5)
ALBUMIN/GLOB SERPL: 0.8 (ref 1.1–2.2)
ALP SERPL-CCNC: 124 U/L (ref 45–117)
ALT SERPL-CCNC: 42 U/L (ref 12–78)
ANION GAP SERPL CALC-SCNC: 4 MMOL/L (ref 5–15)
AST SERPL-CCNC: 23 U/L (ref 15–37)
BILIRUB SERPL-MCNC: 0.3 MG/DL (ref 0.2–1)
BUN SERPL-MCNC: 13 MG/DL (ref 6–20)
BUN/CREAT SERPL: 21 (ref 12–20)
CALCIUM SERPL-MCNC: 9.4 MG/DL (ref 8.5–10.1)
CHLORIDE SERPL-SCNC: 105 MMOL/L (ref 97–108)
CO2 SERPL-SCNC: 30 MMOL/L (ref 21–32)
CREAT SERPL-MCNC: 0.63 MG/DL (ref 0.55–1.02)
GLOBULIN SER CALC-MCNC: 4.4 G/DL (ref 2–4)
GLUCOSE SERPL-MCNC: 101 MG/DL (ref 65–100)
POTASSIUM SERPL-SCNC: 3.4 MMOL/L (ref 3.5–5.1)
PROT SERPL-MCNC: 8.1 G/DL (ref 6.4–8.2)
SODIUM SERPL-SCNC: 139 MMOL/L (ref 136–145)

## 2024-07-04 PROCEDURE — 80053 COMPREHEN METABOLIC PANEL: CPT

## 2024-07-04 PROCEDURE — 36415 COLL VENOUS BLD VENIPUNCTURE: CPT

## 2024-07-04 PROCEDURE — 99283 EMERGENCY DEPT VISIT LOW MDM: CPT

## 2024-07-04 ASSESSMENT — PAIN DESCRIPTION - DESCRIPTORS: DESCRIPTORS: ACHING

## 2024-07-04 ASSESSMENT — PAIN SCALES - GENERAL: PAINLEVEL_OUTOF10: 9

## 2024-07-04 ASSESSMENT — PAIN DESCRIPTION - LOCATION: LOCATION: GENERALIZED

## 2024-07-04 ASSESSMENT — PAIN - FUNCTIONAL ASSESSMENT: PAIN_FUNCTIONAL_ASSESSMENT: 0-10

## 2024-07-04 NOTE — DISCHARGE INSTRUCTIONS
Avoid taking your atorvastatin.  Continue all other medications as prescribed.  Eat a varied diet because your potassium level was slightly low today.  Return to the emergency department symptoms change, worsen, new symptoms arise or you have any other concerns.  Otherwise follow-up with your primary doctor soon as possible for further care.

## 2024-07-04 NOTE — ED PROVIDER NOTES
Extraocular movements intact.   Cardiovascular:      Rate and Rhythm: Normal rate.      Pulses: Normal pulses.   Pulmonary:      Effort: Pulmonary effort is normal.      Breath sounds: Normal breath sounds.   Abdominal:      General: Abdomen is flat.   Musculoskeletal:         General: No deformity or signs of injury.      Cervical back: Normal range of motion and neck supple.      Comments: Some mild tenderness diffusely to the musculature of the lower extremities.  No obvious swelling, deformity.  Intact pulses.  Good cap refill   Skin:     General: Skin is warm.   Neurological:      General: No focal deficit present.      Mental Status: She is alert.   Psychiatric:         Mood and Affect: Mood normal.         DIAGNOSTIC RESULTS   EKG: If obtained, EKG interpretation provided in the ED course    RADIOLOGY:   No orders to display        LABS:  Labs Reviewed   COMPREHENSIVE METABOLIC PANEL - Abnormal; Notable for the following components:       Result Value    Potassium 3.4 (*)     Anion Gap 4 (*)     Glucose 101 (*)     BUN/Creatinine Ratio 21 (*)     Alk Phosphatase 124 (*)     Globulin 4.4 (*)     Albumin/Globulin Ratio 0.8 (*)     All other components within normal limits       All other labs were within normal range or not returned as of this dictation.    EMERGENCY DEPARTMENT COURSE and DIFFERENTIAL DIAGNOSIS/MDM:   Vitals:    Vitals:    07/04/24 1206   BP: (!) 152/93   Pulse: 99   Resp: 17   Temp: 98.2 °F (36.8 °C)   TempSrc: Oral   SpO2: 96%   Weight: 61.7 kg (136 lb)   Height: 1.575 m (5' 2\")       Medical Decision Making  48-year-old female with atraumatic chronic diffuse body aching and muscle soreness.  Vitals reassuring.  Differential includes electrolyte abnormality, medication side effect, viral syndrome.  No clinical evidence of DVT, arterial compromise, compartment syndrome, surgical abdomen.  Electrolytes and metabolic panel obtained which shows no acute abnormalities.  Advised to continue with

## 2024-07-04 NOTE — ED TRIAGE NOTES
Pt arrives to ED via POV ambulatory c/o intermittent BLE pain x3 weeks that has increased in severity. Seen by PCP Tuesday and advised to stop taking Atorvastatin as it could be a possible side effect of med. Hx DM type 1. Provider in triage.

## 2024-07-21 ENCOUNTER — HOSPITAL ENCOUNTER (EMERGENCY)
Facility: HOSPITAL | Age: 49
Discharge: HOME | End: 2024-07-21
Attending: STUDENT IN AN ORGANIZED HEALTH CARE EDUCATION/TRAINING PROGRAM
Payer: COMMERCIAL

## 2024-07-21 VITALS
BODY MASS INDEX: 24.75 KG/M2 | SYSTOLIC BLOOD PRESSURE: 148 MMHG | HEART RATE: 102 BPM | DIASTOLIC BLOOD PRESSURE: 123 MMHG | RESPIRATION RATE: 18 BRPM | HEIGHT: 62 IN | WEIGHT: 134.5 LBS | OXYGEN SATURATION: 99 % | TEMPERATURE: 97.5 F

## 2024-07-21 DIAGNOSIS — R20.8 DYSESTHESIA AFFECTING BOTH SIDES OF BODY: Primary | ICD-10-CM

## 2024-07-21 PROCEDURE — 99283 EMERGENCY DEPT VISIT LOW MDM: CPT

## 2024-07-21 PROCEDURE — 6370000000 HC RX 637 (ALT 250 FOR IP): Performed by: STUDENT IN AN ORGANIZED HEALTH CARE EDUCATION/TRAINING PROGRAM

## 2024-07-21 RX ORDER — METHOCARBAMOL 750 MG/1
750 TABLET, FILM COATED ORAL 3 TIMES DAILY PRN
Qty: 20 TABLET | Refills: 0 | Status: SHIPPED | OUTPATIENT
Start: 2024-07-21

## 2024-07-21 RX ORDER — METHOCARBAMOL 500 MG/1
1000 TABLET, FILM COATED ORAL ONCE
Status: COMPLETED | OUTPATIENT
Start: 2024-07-21 | End: 2024-07-21

## 2024-07-21 RX ORDER — LIDOCAINE 4 G/G
1 PATCH TOPICAL
Status: DISCONTINUED | OUTPATIENT
Start: 2024-07-21 | End: 2024-07-22 | Stop reason: HOSPADM

## 2024-07-21 RX ORDER — LIDOCAINE 4 G/G
1 PATCH TOPICAL 2 TIMES DAILY PRN
Qty: 14 EACH | Refills: 0 | Status: SHIPPED | OUTPATIENT
Start: 2024-07-21

## 2024-07-21 RX ADMIN — METHOCARBAMOL 1000 MG: 500 TABLET ORAL at 21:58

## 2024-07-22 NOTE — ED PROVIDER NOTES
[facility]  EMERGENCY DEPARTMENT ENCOUNTER NOTE    Date: 7/21/2024  Patient Name: Ava Hollis    History of Presenting Illness     Chief Complaint   Patient presents with    Generalized Body Aches       History obtained from: Patient    HPI: Ava Hollis, 48 y.o. female with past medical history as listed and reviewed below presents for dysesthesia of the bilateral thighs and bilateral upper thighs.  The patient started having symptoms approximately 2 months.  She started having muscle aches of the upper thighs.  It was assumed to be a side effect of the atorvastatin and she was taken off 2 weeks ago.  She continued to have symptoms and aches but was told that it might be due to the atorvastatin still and she has a follow-up on September for for reevaluation.  In the meantime, she visited the emergency department twice over the past 3 weeks for evaluation of the symptoms.  She was evaluated by orthopedics and received imaging of the spine that was negative.  She also had a normal CMP CBC, and UA.  CPK was not specifically ordered however she did not have blood on dipstick and did not have an ELZA and therefore that was unlikely.  She has not taken atorvastatin in 2 weeks.  Over the past day, she noticed that she has had the burning pain over the skin of the lower abdomen and bilateral sides that worsens with superficial stroking of the skin.  She also has the same myalgias of the upper anterior thighs and medial thighs.  No lower calf or leg pains.  No other new symptoms.  She denies any fevers, chills, nausea, or vomiting.  She had 1 episode of what appears to be gastroenteritis after eating salmon 1 week ago but that has since resolved. Patient denies any diplopia, dysarthria, facial droop, syncope, seizures, vertigo, or weakness or numbness in the upper or lower extremities. Patient denies any chest pain, palpitations, lightheadedness, dizziness,

## 2024-07-22 NOTE — ED TRIAGE NOTES
Patient presents c/o back, side, lower abdominal, and inner thigh pain. Patient states this pain has been on going since May. Patient has had multiple ER, PCP, and orthopedic visits with no relief of  symptoms.     Patient states that PCP told her that these pains were a side effect to the atorvastatin she was taking previously. Patient states that she was taken off that medication in late June/early July.     Patient states that she took mana back and body around 1500 today.

## 2024-07-22 NOTE — DISCHARGE INSTRUCTIONS
Thank you!    Thank you for allowing me to care for you in the emergency department.  I sincerely hope that you are satisfied with your visit today.  It is my goal to provide you with excellent care.    Below you will find a list of your labs and imaging from your visit today if applicable. Should you have any questions regarding these results please do not hesitate to call the emergency department. Please review [employer] for a more detailed result list since the below list may not be comprehensive. Instructions on how to sign up to [employer] should be provided in this packet.    Labs -  No results found for this or any previous visit (from the past 12 hour(s)).    Radiologic Studies -   No orders to display          If you feel that you have not received excellent quality care or timely care, please ask to speak to the nurse manager. Please choose us in the future for your continued health care needs.   ------------------------------------------------------------------------------------------------------------  The exam and treatment you received in the Emergency Department were for an urgent problem and are not intended as complete care. It is important that you follow-up with a doctor, nurse practitioner, or physician assistant to:  (1) confirm your diagnosis,  (2) re-evaluation of changes in your illness and treatment, and  (3) for ongoing care.  If your symptoms become worse or you do not improve as expected and you are unable to reach your usual health care provider, you should return to the Emergency Department. We are available 24 hours a day.     Please take your discharge instructions with you when you go to your follow-up appointment.     If a prescription has been provided, please have it filled as soon as possible to prevent a delay in treatment. Read the entire medication instruction sheet provided to you by the pharmacy. If you have any questions or reservations about taking the medication due to side

## 2024-08-06 NOTE — ED TRIAGE NOTES
Patient reports at 0900 she had a spell of dizziness that lasted approximately 3 minutes-  Patient reports she was checked by the school nurse and her BP was elevated so she came here for evaluation    Patient denies any chest pain, dizziness or other symptoms at this time.  Patient ambulated to triage with a steady gait [Normal] : no acute distress, well nourished, well developed and well-appearing [de-identified] : superficial laceration mild in anterior RT shin

## 2024-08-11 ENCOUNTER — HOSPITAL ENCOUNTER (EMERGENCY)
Facility: HOSPITAL | Age: 49
Discharge: HOME | End: 2024-08-11
Attending: STUDENT IN AN ORGANIZED HEALTH CARE EDUCATION/TRAINING PROGRAM
Payer: COMMERCIAL

## 2024-08-11 VITALS
OXYGEN SATURATION: 99 % | TEMPERATURE: 98.2 F | DIASTOLIC BLOOD PRESSURE: 94 MMHG | RESPIRATION RATE: 17 BRPM | HEART RATE: 80 BPM | SYSTOLIC BLOOD PRESSURE: 154 MMHG

## 2024-08-11 DIAGNOSIS — M79.10 MYALGIA: Primary | ICD-10-CM

## 2024-08-11 LAB
ALBUMIN SERPL-MCNC: 3.8 G/DL (ref 3.5–5)
ALBUMIN/GLOB SERPL: 0.9 (ref 1.1–2.2)
ALP SERPL-CCNC: 113 U/L (ref 45–117)
ALT SERPL-CCNC: 33 U/L (ref 12–78)
ANION GAP SERPL CALC-SCNC: 2 MMOL/L (ref 5–15)
AST SERPL-CCNC: 44 U/L (ref 15–37)
BASOPHILS # BLD: 0 K/UL (ref 0–0.1)
BASOPHILS NFR BLD: 1 % (ref 0–1)
BILIRUB SERPL-MCNC: 0.4 MG/DL (ref 0.2–1)
BUN SERPL-MCNC: 13 MG/DL (ref 6–20)
BUN/CREAT SERPL: 18 (ref 12–20)
CALCIUM SERPL-MCNC: 9.3 MG/DL (ref 8.5–10.1)
CHLORIDE SERPL-SCNC: 108 MMOL/L (ref 97–108)
CK SERPL-CCNC: 283 U/L (ref 26–192)
CO2 SERPL-SCNC: 29 MMOL/L (ref 21–32)
COMMENT:: NORMAL
COMMENT:: NORMAL
CREAT SERPL-MCNC: 0.71 MG/DL (ref 0.55–1.02)
CRP SERPL-MCNC: <0.29 MG/DL (ref 0–0.3)
DIFFERENTIAL METHOD BLD: ABNORMAL
EOSINOPHIL # BLD: 0.1 K/UL (ref 0–0.4)
EOSINOPHIL NFR BLD: 2 % (ref 0–7)
ERYTHROCYTE [DISTWIDTH] IN BLOOD BY AUTOMATED COUNT: 14 % (ref 11.5–14.5)
ERYTHROCYTE [SEDIMENTATION RATE] IN BLOOD: 12 MM/HR (ref 0–20)
GLOBULIN SER CALC-MCNC: 4.3 G/DL (ref 2–4)
GLUCOSE SERPL-MCNC: 141 MG/DL (ref 65–100)
HCT VFR BLD AUTO: 41 % (ref 35–47)
HGB BLD-MCNC: 12.4 G/DL (ref 11.5–16)
IMM GRANULOCYTES # BLD AUTO: 0 K/UL (ref 0–0.04)
IMM GRANULOCYTES NFR BLD AUTO: 0 % (ref 0–0.5)
LYMPHOCYTES # BLD: 1.4 K/UL (ref 0.8–3.5)
LYMPHOCYTES NFR BLD: 23 % (ref 12–49)
MCH RBC QN AUTO: 25.5 PG (ref 26–34)
MCHC RBC AUTO-ENTMCNC: 30.2 G/DL (ref 30–36.5)
MCV RBC AUTO: 84.2 FL (ref 80–99)
MONOCYTES # BLD: 0.5 K/UL (ref 0–1)
MONOCYTES NFR BLD: 8 % (ref 5–13)
NEUTS SEG # BLD: 4 K/UL (ref 1.8–8)
NEUTS SEG NFR BLD: 66 % (ref 32–75)
NRBC # BLD: 0 K/UL (ref 0–0.01)
NRBC BLD-RTO: 0 PER 100 WBC
PLATELET # BLD AUTO: 270 K/UL (ref 150–400)
PMV BLD AUTO: 11 FL (ref 8.9–12.9)
POTASSIUM SERPL-SCNC: 4.5 MMOL/L (ref 3.5–5.1)
PROT SERPL-MCNC: 8.1 G/DL (ref 6.4–8.2)
RBC # BLD AUTO: 4.87 M/UL (ref 3.8–5.2)
SODIUM SERPL-SCNC: 139 MMOL/L (ref 136–145)
SPECIMEN HOLD: NORMAL
WBC # BLD AUTO: 6 K/UL (ref 3.6–11)

## 2024-08-11 PROCEDURE — 2580000003 HC RX 258

## 2024-08-11 PROCEDURE — 82550 ASSAY OF CK (CPK): CPT

## 2024-08-11 PROCEDURE — 86140 C-REACTIVE PROTEIN: CPT

## 2024-08-11 PROCEDURE — 80053 COMPREHEN METABOLIC PANEL: CPT

## 2024-08-11 PROCEDURE — 99284 EMERGENCY DEPT VISIT MOD MDM: CPT

## 2024-08-11 PROCEDURE — 96374 THER/PROPH/DIAG INJ IV PUSH: CPT

## 2024-08-11 PROCEDURE — 6360000002 HC RX W HCPCS

## 2024-08-11 PROCEDURE — 85025 COMPLETE CBC W/AUTO DIFF WBC: CPT

## 2024-08-11 PROCEDURE — 96361 HYDRATE IV INFUSION ADD-ON: CPT

## 2024-08-11 PROCEDURE — 36415 COLL VENOUS BLD VENIPUNCTURE: CPT

## 2024-08-11 PROCEDURE — 85652 RBC SED RATE AUTOMATED: CPT

## 2024-08-11 RX ORDER — 0.9 % SODIUM CHLORIDE 0.9 %
1000 INTRAVENOUS SOLUTION INTRAVENOUS ONCE
Status: COMPLETED | OUTPATIENT
Start: 2024-08-11 | End: 2024-08-11

## 2024-08-11 RX ORDER — KETOROLAC TROMETHAMINE 30 MG/ML
30 INJECTION, SOLUTION INTRAMUSCULAR; INTRAVENOUS
Status: COMPLETED | OUTPATIENT
Start: 2024-08-11 | End: 2024-08-11

## 2024-08-11 RX ADMIN — SODIUM CHLORIDE 1000 ML: 9 INJECTION, SOLUTION INTRAVENOUS at 14:24

## 2024-08-11 RX ADMIN — KETOROLAC TROMETHAMINE 30 MG: 30 INJECTION, SOLUTION INTRAMUSCULAR at 14:25

## 2024-08-11 ASSESSMENT — PAIN SCALES - GENERAL: PAINLEVEL_OUTOF10: 10

## 2024-08-11 ASSESSMENT — PAIN DESCRIPTION - DESCRIPTORS: DESCRIPTORS: ACHING

## 2024-08-11 ASSESSMENT — PAIN DESCRIPTION - ORIENTATION: ORIENTATION: OTHER (COMMENT)

## 2024-08-11 ASSESSMENT — PAIN - FUNCTIONAL ASSESSMENT
PAIN_FUNCTIONAL_ASSESSMENT: ACTIVITIES ARE NOT PREVENTED
PAIN_FUNCTIONAL_ASSESSMENT: 0-10

## 2024-08-11 ASSESSMENT — PAIN DESCRIPTION - FREQUENCY: FREQUENCY: CONTINUOUS

## 2024-08-11 ASSESSMENT — PAIN DESCRIPTION - ONSET: ONSET: ON-GOING

## 2024-08-11 ASSESSMENT — PAIN DESCRIPTION - PAIN TYPE: TYPE: ACUTE PAIN

## 2024-08-11 ASSESSMENT — PAIN DESCRIPTION - LOCATION: LOCATION: GENERALIZED

## 2024-08-11 NOTE — ED TRIAGE NOTES
Pt reports a burning pain all over her body x3 months. Pt denies fever, rash, chills, CP and SOB.

## 2024-08-11 NOTE — ED PROVIDER NOTES
[facility] EMERGENCY DEP  EMERGENCY DEPARTMENT ENCOUNTER      Pt Name: Ava Hollis  MRN: [id number]  Birthdate 1975  Date of evaluation: 8/11/2024  Provider: Donald Foote PA-C    CHIEF COMPLAINT       Chief Complaint   Patient presents with    Pain         HISTORY OF PRESENT ILLNESS   (Location/Symptom, Timing/Onset, Context/Setting, Quality, Duration, Modifying Factors, Severity)  Note limiting factors.   49-year-old female with past medical history of diabetes, hypertension, hyperlipidemia presents with complaint of diffuse body pain.  Patient reports this is been going on for the last 3 months.  She has been seen and evaluated by her primary care provider as well as multiple emergency departments without identifiable cause.  She reports a diffuse muscle achiness and burning sensation.  She reports pain in her low back, hips, knees.  Her primary care thought it may be related to his statin.  She has been off of this since early July.  She was also seen by Ortho who did x-rays that were reportedly normal.  Denies rashes.  Denies fever, cancer history, IV drug use, numbness/ting in the groin area, fecal/urinary incontinence.  Denies trauma, chest pain, shortness of breath, abdominal pain, nausea, vomiting, urinary symptoms, or any other complaints at this time.            Review of External Medical Records:     Nursing Notes were reviewed.    REVIEW OF SYSTEMS    (2-9 systems for level 4, 10 or more for level 5)     Review of Systems    Except as noted above the remainder of the review of systems was reviewed and negative.       PAST MEDICAL HISTORY     Past Medical History:   Diagnosis Date    Anemia     Anemia     Diabetes (HCC)     type 1    Dysmenorrhea     Endometriosis     GERD (gastroesophageal reflux disease)     Mitral valve prolapse     Spontaneous pneumothorax 3/2013 and 8/2013    recurrent right--total of 4 episodes, 2 required surgeries, above dates         SURGICAL HISTORY

## 2024-10-19 ENCOUNTER — HOSPITAL ENCOUNTER (EMERGENCY)
Facility: HOSPITAL | Age: 49
Discharge: HOME | End: 2024-10-19
Attending: EMERGENCY MEDICINE
Payer: COMMERCIAL

## 2024-10-19 VITALS
HEIGHT: 62 IN | RESPIRATION RATE: 18 BRPM | HEART RATE: 99 BPM | OXYGEN SATURATION: 99 % | WEIGHT: 132 LBS | SYSTOLIC BLOOD PRESSURE: 164 MMHG | TEMPERATURE: 98.3 F | BODY MASS INDEX: 24.29 KG/M2 | DIASTOLIC BLOOD PRESSURE: 106 MMHG

## 2024-10-19 DIAGNOSIS — M54.32 BILATERAL SCIATICA: Primary | ICD-10-CM

## 2024-10-19 DIAGNOSIS — M54.31 BILATERAL SCIATICA: Primary | ICD-10-CM

## 2024-10-19 PROCEDURE — 96372 THER/PROPH/DIAG INJ SC/IM: CPT

## 2024-10-19 PROCEDURE — 99284 EMERGENCY DEPT VISIT MOD MDM: CPT

## 2024-10-19 PROCEDURE — 6370000000 HC RX 637 (ALT 250 FOR IP): Performed by: EMERGENCY MEDICINE

## 2024-10-19 PROCEDURE — 6360000002 HC RX W HCPCS: Performed by: EMERGENCY MEDICINE

## 2024-10-19 RX ORDER — ACETAMINOPHEN 500 MG
1000 TABLET ORAL
Status: COMPLETED | OUTPATIENT
Start: 2024-10-19 | End: 2024-10-19

## 2024-10-19 RX ORDER — KETOROLAC TROMETHAMINE 30 MG/ML
30 INJECTION, SOLUTION INTRAMUSCULAR; INTRAVENOUS
Status: COMPLETED | OUTPATIENT
Start: 2024-10-19 | End: 2024-10-19

## 2024-10-19 RX ORDER — KETOROLAC TROMETHAMINE 10 MG/1
10 TABLET, FILM COATED ORAL EVERY 6 HOURS PRN
Qty: 20 TABLET | Refills: 0 | Status: SHIPPED | OUTPATIENT
Start: 2024-10-19

## 2024-10-19 RX ORDER — ACETAMINOPHEN 500 MG
1000 TABLET ORAL EVERY 8 HOURS PRN
Qty: 360 TABLET | Refills: 1 | Status: SHIPPED | OUTPATIENT
Start: 2024-10-19

## 2024-10-19 RX ADMIN — KETOROLAC TROMETHAMINE 30 MG: 30 INJECTION, SOLUTION INTRAMUSCULAR; INTRAVENOUS at 03:25

## 2024-10-19 RX ADMIN — ACETAMINOPHEN 1000 MG: 500 TABLET ORAL at 03:24

## 2024-10-19 ASSESSMENT — PAIN - FUNCTIONAL ASSESSMENT: PAIN_FUNCTIONAL_ASSESSMENT: 0-10

## 2024-10-19 ASSESSMENT — PAIN SCALES - GENERAL: PAINLEVEL_OUTOF10: 10

## 2024-10-19 NOTE — ED TRIAGE NOTES
Pt reports she has sciatica and is doing PT. Pain worsened x 4 days. Pain to bilateral lower back radiating down both legs. Has appt with ortho on 11/1

## 2024-10-19 NOTE — ED PROVIDER NOTES
DISPOSITION/PLAN   DISPOSITION Decision To Discharge 10/19/2024 03:20:52 AM  Condition at Disposition: Data Unavailable    Discharge Note: The patient is stable for discharge home. The signs, symptoms, diagnosis, and discharge instructions have been discussed, understanding conveyed, and agreed upon. The patient is to follow up as recommended or return to ER should their symptoms worsen.      PATIENT REFERRED TO:  Your Primary Care doctor    Call in 1 day          DISCHARGE MEDICATIONS:     Medication List        START taking these medications      acetaminophen 500 MG tablet  Commonly known as: TYLENOL  Take 2 tablets by mouth every 8 hours as needed for Fever or Pain     ketorolac 10 MG tablet  Commonly known as: TORADOL  Take 1 tablet by mouth every 6 hours as needed for Pain            ASK your doctor about these medications      buPROPion 75 MG tablet  Commonly known as: WELLBUTRIN     escitalopram 5 MG tablet  Commonly known as: LEXAPRO     insulin aspart 100 UNIT/ML injection pen  Commonly known as: NovoLOG     Insulin Degludec 100 UNIT/ML Sopn     lidocaine 4 % external patch  Place 1 patch onto the skin 2 times daily as needed (for pain)     lisinopril 2.5 MG tablet  Commonly known as: PRINIVIL;ZESTRIL     methocarbamol 750 MG tablet  Commonly known as: Robaxin-750  Take 1 tablet by mouth 3 times daily as needed (pain)     omeprazole 20 MG delayed release capsule  Commonly known as: PRILOSEC     ondansetron 4 MG tablet  Commonly known as: ZOFRAN               Where to Get Your Medications        These medications were sent to [id number] - [place], [place] - [address] - P [phone] - F [phone] [address] [facility] [id number]      Phone: [phone]   acetaminophen 500 MG tablet  ketorolac 10 MG tablet           DISCONTINUED MEDICATIONS:  Discharge Medication List as of 10/19/2024  3:39 AM          I am the Primary Clinician of Record. Dimitri singleton MD  (electronically signed)    (Please note that parts of this dictation were completed with voice recognition software. Quite often unanticipated grammatical, syntax, homophones, and other interpretive errors are inadvertently transcribed by the computer software. Please disregards these errors. Please excuse any errors that have escaped final proofreading.)     Dimitri Garcia MD  10/19/24 0424

## 2025-02-12 ENCOUNTER — HOSPITAL ENCOUNTER (OUTPATIENT)
Facility: HOSPITAL | Age: 50
Setting detail: OBSERVATION
LOS: 1 days | Discharge: HOME | End: 2025-02-13
Attending: STUDENT IN AN ORGANIZED HEALTH CARE EDUCATION/TRAINING PROGRAM | Admitting: INTERNAL MEDICINE
Payer: COMMERCIAL

## 2025-02-12 ENCOUNTER — APPOINTMENT (OUTPATIENT)
Facility: HOSPITAL | Age: 50
End: 2025-02-12
Payer: COMMERCIAL

## 2025-02-12 DIAGNOSIS — R06.00 DYSPNEA, UNSPECIFIED TYPE: ICD-10-CM

## 2025-02-12 DIAGNOSIS — J90 PLEURAL EFFUSION: ICD-10-CM

## 2025-02-12 DIAGNOSIS — J93.9 PNEUMOTHORAX, UNSPECIFIED TYPE: Primary | ICD-10-CM

## 2025-02-12 LAB
ALBUMIN SERPL-MCNC: 3.9 G/DL (ref 3.5–5)
ALBUMIN/GLOB SERPL: 1 (ref 1.1–2.2)
ALP SERPL-CCNC: 164 U/L (ref 45–117)
ALT SERPL-CCNC: 35 U/L (ref 12–78)
ANION GAP SERPL CALC-SCNC: 7 MMOL/L (ref 2–12)
AST SERPL-CCNC: 29 U/L (ref 15–37)
BASOPHILS # BLD: 0.03 K/UL (ref 0–0.1)
BASOPHILS NFR BLD: 0.4 % (ref 0–1)
BILIRUB SERPL-MCNC: 0.3 MG/DL (ref 0.2–1)
BUN SERPL-MCNC: 9 MG/DL (ref 6–20)
BUN/CREAT SERPL: 12 (ref 12–20)
CALCIUM SERPL-MCNC: 9.8 MG/DL (ref 8.5–10.1)
CHLORIDE SERPL-SCNC: 100 MMOL/L (ref 97–108)
CO2 SERPL-SCNC: 27 MMOL/L (ref 21–32)
COMMENT:: NORMAL
CREAT SERPL-MCNC: 0.75 MG/DL (ref 0.55–1.02)
D DIMER PPP FEU-MCNC: 0.61 MG/L FEU (ref 0–0.65)
DIFFERENTIAL METHOD BLD: ABNORMAL
EOSINOPHIL # BLD: 0.05 K/UL (ref 0–0.4)
EOSINOPHIL NFR BLD: 0.7 % (ref 0–7)
ERYTHROCYTE [DISTWIDTH] IN BLOOD BY AUTOMATED COUNT: 13 % (ref 11.5–14.5)
FLUAV RNA SPEC QL NAA+PROBE: NOT DETECTED
FLUBV RNA SPEC QL NAA+PROBE: NOT DETECTED
GLOBULIN SER CALC-MCNC: 4.1 G/DL (ref 2–4)
GLUCOSE BLD STRIP.AUTO-MCNC: 337 MG/DL (ref 65–117)
GLUCOSE BLD STRIP.AUTO-MCNC: 441 MG/DL (ref 65–117)
GLUCOSE SERPL-MCNC: 398 MG/DL (ref 65–100)
HCT VFR BLD AUTO: 40.1 % (ref 35–47)
HGB BLD-MCNC: 12.8 G/DL (ref 11.5–16)
IMM GRANULOCYTES # BLD AUTO: 0.02 K/UL (ref 0–0.04)
IMM GRANULOCYTES NFR BLD AUTO: 0.3 % (ref 0–0.5)
LYMPHOCYTES # BLD: 1.23 K/UL (ref 0.8–3.5)
LYMPHOCYTES NFR BLD: 16.6 % (ref 12–49)
MAGNESIUM SERPL-MCNC: 2.3 MG/DL (ref 1.6–2.4)
MCH RBC QN AUTO: 26 PG (ref 26–34)
MCHC RBC AUTO-ENTMCNC: 31.9 G/DL (ref 30–36.5)
MCV RBC AUTO: 81.5 FL (ref 80–99)
MONOCYTES # BLD: 0.5 K/UL (ref 0–1)
MONOCYTES NFR BLD: 6.7 % (ref 5–13)
NEUTS SEG # BLD: 5.59 K/UL (ref 1.8–8)
NEUTS SEG NFR BLD: 75.3 % (ref 32–75)
NRBC # BLD: 0 K/UL (ref 0–0.01)
NRBC BLD-RTO: 0 PER 100 WBC
PLATELET # BLD AUTO: 263 K/UL (ref 150–400)
PMV BLD AUTO: 11 FL (ref 8.9–12.9)
POTASSIUM SERPL-SCNC: 3.7 MMOL/L (ref 3.5–5.1)
PROT SERPL-MCNC: 8 G/DL (ref 6.4–8.2)
RBC # BLD AUTO: 4.92 M/UL (ref 3.8–5.2)
SARS-COV-2 RNA RESP QL NAA+PROBE: NOT DETECTED
SERVICE CMNT-IMP: ABNORMAL
SERVICE CMNT-IMP: ABNORMAL
SODIUM SERPL-SCNC: 134 MMOL/L (ref 136–145)
SOURCE: NORMAL
SPECIMEN HOLD: NORMAL
TROPONIN I SERPL HS-MCNC: 4 NG/L (ref 0–51)
WBC # BLD AUTO: 7.4 K/UL (ref 3.6–11)

## 2025-02-12 PROCEDURE — 93005 ELECTROCARDIOGRAM TRACING: CPT | Performed by: EMERGENCY MEDICINE

## 2025-02-12 PROCEDURE — 87636 SARSCOV2 & INF A&B AMP PRB: CPT

## 2025-02-12 PROCEDURE — 85379 FIBRIN DEGRADATION QUANT: CPT

## 2025-02-12 PROCEDURE — 2500000003 HC RX 250 WO HCPCS: Performed by: INTERNAL MEDICINE

## 2025-02-12 PROCEDURE — 80053 COMPREHEN METABOLIC PANEL: CPT

## 2025-02-12 PROCEDURE — 6370000000 HC RX 637 (ALT 250 FOR IP): Performed by: INTERNAL MEDICINE

## 2025-02-12 PROCEDURE — 36415 COLL VENOUS BLD VENIPUNCTURE: CPT

## 2025-02-12 PROCEDURE — 99285 EMERGENCY DEPT VISIT HI MDM: CPT

## 2025-02-12 PROCEDURE — 71046 X-RAY EXAM CHEST 2 VIEWS: CPT

## 2025-02-12 PROCEDURE — 84484 ASSAY OF TROPONIN QUANT: CPT

## 2025-02-12 PROCEDURE — 85025 COMPLETE CBC W/AUTO DIFF WBC: CPT

## 2025-02-12 PROCEDURE — 83735 ASSAY OF MAGNESIUM: CPT

## 2025-02-12 PROCEDURE — G0378 HOSPITAL OBSERVATION PER HR: HCPCS

## 2025-02-12 PROCEDURE — 6360000004 HC RX CONTRAST MEDICATION: Performed by: STUDENT IN AN ORGANIZED HEALTH CARE EDUCATION/TRAINING PROGRAM

## 2025-02-12 PROCEDURE — 82962 GLUCOSE BLOOD TEST: CPT

## 2025-02-12 PROCEDURE — 71275 CT ANGIOGRAPHY CHEST: CPT

## 2025-02-12 RX ORDER — POTASSIUM CHLORIDE 750 MG/1
40 TABLET, EXTENDED RELEASE ORAL PRN
Status: DISCONTINUED | OUTPATIENT
Start: 2025-02-12 | End: 2025-02-13 | Stop reason: HOSPADM

## 2025-02-12 RX ORDER — PREDNISONE 20 MG/1
60 TABLET ORAL DAILY
Status: DISCONTINUED | OUTPATIENT
Start: 2025-02-12 | End: 2025-02-13 | Stop reason: HOSPADM

## 2025-02-12 RX ORDER — ACETAMINOPHEN 650 MG/1
650 SUPPOSITORY RECTAL EVERY 6 HOURS PRN
Status: DISCONTINUED | OUTPATIENT
Start: 2025-02-12 | End: 2025-02-13 | Stop reason: HOSPADM

## 2025-02-12 RX ORDER — ACETAMINOPHEN 325 MG/1
650 TABLET ORAL EVERY 6 HOURS PRN
Status: DISCONTINUED | OUTPATIENT
Start: 2025-02-12 | End: 2025-02-13 | Stop reason: HOSPADM

## 2025-02-12 RX ORDER — ONDANSETRON 2 MG/ML
4 INJECTION INTRAMUSCULAR; INTRAVENOUS EVERY 6 HOURS PRN
Status: DISCONTINUED | OUTPATIENT
Start: 2025-02-12 | End: 2025-02-13 | Stop reason: HOSPADM

## 2025-02-12 RX ORDER — BUPROPION HYDROCHLORIDE 75 MG/1
75 TABLET ORAL 2 TIMES DAILY
Status: DISCONTINUED | OUTPATIENT
Start: 2025-02-12 | End: 2025-02-12

## 2025-02-12 RX ORDER — POTASSIUM CHLORIDE 7.45 MG/ML
10 INJECTION INTRAVENOUS PRN
Status: DISCONTINUED | OUTPATIENT
Start: 2025-02-12 | End: 2025-02-13 | Stop reason: HOSPADM

## 2025-02-12 RX ORDER — INSULIN GLARGINE 100 [IU]/ML
32 INJECTION, SOLUTION SUBCUTANEOUS DAILY
Status: DISCONTINUED | OUTPATIENT
Start: 2025-02-12 | End: 2025-02-13 | Stop reason: HOSPADM

## 2025-02-12 RX ORDER — ESCITALOPRAM OXALATE 10 MG/1
5 TABLET ORAL DAILY
Status: DISCONTINUED | OUTPATIENT
Start: 2025-02-13 | End: 2025-02-13 | Stop reason: HOSPADM

## 2025-02-12 RX ORDER — METHOCARBAMOL 750 MG/1
750 TABLET, FILM COATED ORAL 3 TIMES DAILY PRN
Status: DISCONTINUED | OUTPATIENT
Start: 2025-02-12 | End: 2025-02-12

## 2025-02-12 RX ORDER — MAGNESIUM SULFATE IN WATER 40 MG/ML
2000 INJECTION, SOLUTION INTRAVENOUS PRN
Status: DISCONTINUED | OUTPATIENT
Start: 2025-02-12 | End: 2025-02-13 | Stop reason: HOSPADM

## 2025-02-12 RX ORDER — ENOXAPARIN SODIUM 100 MG/ML
40 INJECTION SUBCUTANEOUS DAILY
Status: DISCONTINUED | OUTPATIENT
Start: 2025-02-12 | End: 2025-02-13 | Stop reason: HOSPADM

## 2025-02-12 RX ORDER — IOPAMIDOL 755 MG/ML
100 INJECTION, SOLUTION INTRAVASCULAR
Status: COMPLETED | OUTPATIENT
Start: 2025-02-12 | End: 2025-02-12

## 2025-02-12 RX ORDER — PANTOPRAZOLE SODIUM 40 MG/1
40 TABLET, DELAYED RELEASE ORAL
Status: DISCONTINUED | OUTPATIENT
Start: 2025-02-13 | End: 2025-02-13 | Stop reason: HOSPADM

## 2025-02-12 RX ORDER — LIDOCAINE 4 G/G
1 PATCH TOPICAL DAILY PRN
Status: DISCONTINUED | OUTPATIENT
Start: 2025-02-12 | End: 2025-02-13 | Stop reason: HOSPADM

## 2025-02-12 RX ORDER — SODIUM CHLORIDE 0.9 % (FLUSH) 0.9 %
5-40 SYRINGE (ML) INJECTION EVERY 12 HOURS SCHEDULED
Status: DISCONTINUED | OUTPATIENT
Start: 2025-02-12 | End: 2025-02-13 | Stop reason: HOSPADM

## 2025-02-12 RX ORDER — POLYETHYLENE GLYCOL 3350 17 G/17G
17 POWDER, FOR SOLUTION ORAL DAILY PRN
Status: DISCONTINUED | OUTPATIENT
Start: 2025-02-12 | End: 2025-02-13 | Stop reason: HOSPADM

## 2025-02-12 RX ORDER — SODIUM CHLORIDE 0.9 % (FLUSH) 0.9 %
5-40 SYRINGE (ML) INJECTION PRN
Status: DISCONTINUED | OUTPATIENT
Start: 2025-02-12 | End: 2025-02-13 | Stop reason: HOSPADM

## 2025-02-12 RX ORDER — BENZONATATE 100 MG/1
100 CAPSULE ORAL 3 TIMES DAILY
Status: DISCONTINUED | OUTPATIENT
Start: 2025-02-12 | End: 2025-02-13 | Stop reason: HOSPADM

## 2025-02-12 RX ORDER — CODEINE PHOSPHATE AND GUAIFENESIN 10; 100 MG/5ML; MG/5ML
5 SOLUTION ORAL EVERY 4 HOURS PRN
Status: DISCONTINUED | OUTPATIENT
Start: 2025-02-12 | End: 2025-02-13 | Stop reason: HOSPADM

## 2025-02-12 RX ORDER — SODIUM CHLORIDE 9 MG/ML
INJECTION, SOLUTION INTRAVENOUS PRN
Status: DISCONTINUED | OUTPATIENT
Start: 2025-02-12 | End: 2025-02-13 | Stop reason: HOSPADM

## 2025-02-12 RX ORDER — ONDANSETRON 4 MG/1
4 TABLET, ORALLY DISINTEGRATING ORAL EVERY 8 HOURS PRN
Status: DISCONTINUED | OUTPATIENT
Start: 2025-02-12 | End: 2025-02-13 | Stop reason: HOSPADM

## 2025-02-12 RX ORDER — IPRATROPIUM BROMIDE AND ALBUTEROL SULFATE 2.5; .5 MG/3ML; MG/3ML
1 SOLUTION RESPIRATORY (INHALATION)
Status: DISCONTINUED | OUTPATIENT
Start: 2025-02-12 | End: 2025-02-13

## 2025-02-12 RX ORDER — INSULIN LISPRO 100 [IU]/ML
0-4 INJECTION, SOLUTION INTRAVENOUS; SUBCUTANEOUS
Status: DISCONTINUED | OUTPATIENT
Start: 2025-02-12 | End: 2025-02-13 | Stop reason: HOSPADM

## 2025-02-12 RX ORDER — LISINOPRIL 5 MG/1
2.5 TABLET ORAL DAILY
Status: DISCONTINUED | OUTPATIENT
Start: 2025-02-12 | End: 2025-02-12

## 2025-02-12 RX ADMIN — PREDNISONE 60 MG: 20 TABLET ORAL at 18:09

## 2025-02-12 RX ADMIN — INSULIN GLARGINE 32 UNITS: 100 INJECTION, SOLUTION SUBCUTANEOUS at 18:10

## 2025-02-12 RX ADMIN — INSULIN LISPRO 3 UNITS: 100 INJECTION, SOLUTION INTRAVENOUS; SUBCUTANEOUS at 23:07

## 2025-02-12 RX ADMIN — BENZONATATE 100 MG: 100 CAPSULE ORAL at 18:09

## 2025-02-12 RX ADMIN — IOPAMIDOL 100 ML: 755 INJECTION, SOLUTION INTRAVENOUS at 14:32

## 2025-02-12 RX ADMIN — SODIUM CHLORIDE, PRESERVATIVE FREE 10 ML: 5 INJECTION INTRAVENOUS at 23:03

## 2025-02-12 RX ADMIN — BENZONATATE 100 MG: 100 CAPSULE ORAL at 23:03

## 2025-02-12 RX ADMIN — GUAIFENESIN AND CODEINE PHOSPHATE 5 ML: 100; 10 SOLUTION ORAL at 23:03

## 2025-02-12 RX ADMIN — INSULIN LISPRO 4 UNITS: 100 INJECTION, SOLUTION INTRAVENOUS; SUBCUTANEOUS at 18:11

## 2025-02-12 RX ADMIN — IPRATROPIUM BROMIDE AND ALBUTEROL SULFATE 1 DOSE: .5; 3 SOLUTION RESPIRATORY (INHALATION) at 21:04

## 2025-02-12 ASSESSMENT — LIFESTYLE VARIABLES
HOW OFTEN DO YOU HAVE A DRINK CONTAINING ALCOHOL: PATIENT DECLINED
HOW MANY STANDARD DRINKS CONTAINING ALCOHOL DO YOU HAVE ON A TYPICAL DAY: PATIENT DECLINED

## 2025-02-12 ASSESSMENT — ENCOUNTER SYMPTOMS
SINUS PAIN: 0
SINUS PRESSURE: 0
ABDOMINAL DISTENTION: 0
SHORTNESS OF BREATH: 1
VOMITING: 0
EYE REDNESS: 0
NAUSEA: 0
COUGH: 0
EYE PAIN: 0
ABDOMINAL PAIN: 0
COLOR CHANGE: 0

## 2025-02-12 NOTE — ED TRIAGE NOTES
Patient ambulatory to ER triage for complaints of SOB and cough x 1 week.     Reports flu x 1 week ago.     Denies chest pain.     Denies swelling to feet.

## 2025-02-12 NOTE — H&P
[facility]  [address] [place] [address] [phone]    Admission History and Physical      NAME:  Ava Hollis   :   1975   MRN:  [id number]     PCP:  Adonis Frazier MD     Date/Time of service:  2025  4:43 PM        Subjective:     CHIEF COMPLAINT: Dyspnea    HISTORY OF PRESENT ILLNESS:     Ms. Hollis is a 49 y.o.  female with a past medical history of numerous pneumothorax due to blebs, type 1 diabetes, GERD who is admitted with pleural effusion with associated pneumothorax.  Ms. Hollis states that over the last week she has been feeling short of breath once her symptoms worsening today.  She also endorses cough.  States her highest 99.  She endorses some nausea but no vomiting.  Of note, she states that she had the flu last week and completed a course of Tamiflu.    No Known Allergies    Prior to Admission medications    Medication Sig Start Date End Date Taking? Authorizing Provider   acetaminophen (TYLENOL) 500 MG tablet Take 2 tablets by mouth every 8 hours as needed for Fever or Pain 10/19/24   Dimitri Garcia MD   ketorolac (TORADOL) 10 MG tablet Take 1 tablet by mouth every 6 hours as needed for Pain 10/19/24   Dimitri Garcia MD   methocarbamol (ROBAXIN-750) 750 MG tablet Take 1 tablet by mouth 3 times daily as needed (pain) 24   Kofi Mazariegos MD   lidocaine 4 % external patch Place 1 patch onto the skin 2 times daily as needed (for pain) 24   Kofi Mazariegos MD   buPROPion (WELLBUTRIN) 75 MG tablet Take 1 tablet by mouth 2 times daily    Automatic Reconciliation, Ar   escitalopram (LEXAPRO) 5 MG tablet Take 1 tablet by mouth daily    Automatic Reconciliation, Ar   insulin aspart (NOVOLOG) 100 UNIT/ML injection pen Inject into the skin 3 times daily (before meals)    Automatic Reconciliation, Ar   Insulin Degludec 100 UNIT/ML SOPN Inject 32 Units into the skin    Automatic Reconciliation, Ar   lisinopril

## 2025-02-12 NOTE — ED PROVIDER NOTES
[facility] EMERGENCY DEPARTMENT  EMERGENCY DEPARTMENT ENCOUNTER      Pt Name: Ava Hollis  MRN: [id number]  Birthdate 1975  Date of evaluation: 2/12/2025  Provider: MIRI Rivera NP      HISTORY OF PRESENT ILLNESS      Chief Complaint (CC):  49-year-old female presenting with shortness of breath (SOB) and cough for 1 week.    Past Medical History:  No date: Anemia  No date: Anemia  No date: Diabetes (HCC)      Comment:  type 1  No date: Dysmenorrhea  No date: Endometriosis  No date: GERD (gastroesophageal reflux disease)  No date: Mitral valve prolapse  No date: Sciatica  3/2013 and 8/2013: Spontaneous pneumothorax      Comment:  recurrent right--total of 4 episodes, 2 required                surgeries, above dates  Past Surgical History:  09/20/2018: DILATION AND CURETTAGE OF UTERUS  2009: OTHER SURGICAL HISTORY      Comment:  ex lap, abdominal for endometriosis  3/21/2013: OTHER SURGICAL HISTORY      Comment:  Bronch, RVATS, apical bleb rsxn,parietal pleurectomy  8/21/2013: OTHER SURGICAL HISTORY      Comment:  Bronchoscopy, RVATS and talc pleurodesis        The history is provided by the patient.           Nursing Notes were reviewed.    REVIEW OF SYSTEMS         Review of Systems   Constitutional:  Negative for appetite change, chills, diaphoresis and fatigue.   HENT:  Negative for congestion, sinus pressure and sinus pain.    Eyes:  Negative for pain and redness.   Respiratory:  Positive for shortness of breath. Negative for cough.    Cardiovascular:  Negative for chest pain and palpitations.   Gastrointestinal:  Negative for abdominal distention, abdominal pain, nausea and vomiting.   Musculoskeletal:  Negative for arthralgias, neck pain and neck stiffness.   Skin:  Negative for color change and wound.   Neurological:  Negative for dizziness, speech difficulty and headaches.   Hematological:  Does not bruise/bleed easily.   Psychiatric/Behavioral:  The patient

## 2025-02-13 ENCOUNTER — APPOINTMENT (OUTPATIENT)
Facility: HOSPITAL | Age: 50
End: 2025-02-13
Payer: COMMERCIAL

## 2025-02-13 VITALS
OXYGEN SATURATION: 97 % | DIASTOLIC BLOOD PRESSURE: 82 MMHG | WEIGHT: 118.17 LBS | HEIGHT: 63 IN | BODY MASS INDEX: 20.94 KG/M2 | RESPIRATION RATE: 14 BRPM | SYSTOLIC BLOOD PRESSURE: 129 MMHG | HEART RATE: 88 BPM | TEMPERATURE: 97.9 F

## 2025-02-13 LAB
ANION GAP SERPL CALC-SCNC: 8 MMOL/L (ref 2–12)
BASOPHILS # BLD: 0.01 K/UL (ref 0–0.1)
BASOPHILS NFR BLD: 0.2 % (ref 0–1)
BUN SERPL-MCNC: 15 MG/DL (ref 6–20)
BUN/CREAT SERPL: 20 (ref 12–20)
CALCIUM SERPL-MCNC: 9.5 MG/DL (ref 8.5–10.1)
CHLORIDE SERPL-SCNC: 102 MMOL/L (ref 97–108)
CO2 SERPL-SCNC: 26 MMOL/L (ref 21–32)
CREAT SERPL-MCNC: 0.76 MG/DL (ref 0.55–1.02)
DIFFERENTIAL METHOD BLD: ABNORMAL
EOSINOPHIL # BLD: 0 K/UL (ref 0–0.4)
EOSINOPHIL NFR BLD: 0 % (ref 0–7)
ERYTHROCYTE [DISTWIDTH] IN BLOOD BY AUTOMATED COUNT: 13 % (ref 11.5–14.5)
GLUCOSE BLD STRIP.AUTO-MCNC: 440 MG/DL (ref 65–117)
GLUCOSE BLD STRIP.AUTO-MCNC: 476 MG/DL (ref 65–117)
GLUCOSE SERPL-MCNC: 371 MG/DL (ref 65–100)
HCT VFR BLD AUTO: 39.3 % (ref 35–47)
HGB BLD-MCNC: 12.7 G/DL (ref 11.5–16)
IMM GRANULOCYTES # BLD AUTO: 0.02 K/UL (ref 0–0.04)
IMM GRANULOCYTES NFR BLD AUTO: 0.3 % (ref 0–0.5)
LYMPHOCYTES # BLD: 0.53 K/UL (ref 0.8–3.5)
LYMPHOCYTES NFR BLD: 8 % (ref 12–49)
MCH RBC QN AUTO: 26.8 PG (ref 26–34)
MCHC RBC AUTO-ENTMCNC: 32.3 G/DL (ref 30–36.5)
MCV RBC AUTO: 82.9 FL (ref 80–99)
MONOCYTES # BLD: 0.07 K/UL (ref 0–1)
MONOCYTES NFR BLD: 1.1 % (ref 5–13)
NEUTS SEG # BLD: 5.97 K/UL (ref 1.8–8)
NEUTS SEG NFR BLD: 90.4 % (ref 32–75)
NRBC # BLD: 0 K/UL (ref 0–0.01)
NRBC BLD-RTO: 0 PER 100 WBC
PLATELET # BLD AUTO: 253 K/UL (ref 150–400)
PMV BLD AUTO: 11.1 FL (ref 8.9–12.9)
POTASSIUM SERPL-SCNC: 4.2 MMOL/L (ref 3.5–5.1)
RBC # BLD AUTO: 4.74 M/UL (ref 3.8–5.2)
RBC MORPH BLD: ABNORMAL
SERVICE CMNT-IMP: ABNORMAL
SERVICE CMNT-IMP: ABNORMAL
SODIUM SERPL-SCNC: 136 MMOL/L (ref 136–145)
WBC # BLD AUTO: 6.6 K/UL (ref 3.6–11)

## 2025-02-13 PROCEDURE — 82962 GLUCOSE BLOOD TEST: CPT

## 2025-02-13 PROCEDURE — 2500000003 HC RX 250 WO HCPCS: Performed by: INTERNAL MEDICINE

## 2025-02-13 PROCEDURE — 6370000000 HC RX 637 (ALT 250 FOR IP): Performed by: INTERNAL MEDICINE

## 2025-02-13 PROCEDURE — 80048 BASIC METABOLIC PNL TOTAL CA: CPT

## 2025-02-13 PROCEDURE — 6370000000 HC RX 637 (ALT 250 FOR IP): Performed by: STUDENT IN AN ORGANIZED HEALTH CARE EDUCATION/TRAINING PROGRAM

## 2025-02-13 PROCEDURE — 85025 COMPLETE CBC W/AUTO DIFF WBC: CPT

## 2025-02-13 PROCEDURE — 71045 X-RAY EXAM CHEST 1 VIEW: CPT

## 2025-02-13 PROCEDURE — G0378 HOSPITAL OBSERVATION PER HR: HCPCS

## 2025-02-13 RX ORDER — IPRATROPIUM BROMIDE AND ALBUTEROL SULFATE 2.5; .5 MG/3ML; MG/3ML
SOLUTION RESPIRATORY (INHALATION)
Qty: 360 ML | Refills: 0 | Status: SHIPPED | OUTPATIENT
Start: 2025-02-13

## 2025-02-13 RX ORDER — PREDNISONE 10 MG/1
TABLET ORAL
Qty: 30 TABLET | Refills: 0 | Status: SHIPPED | OUTPATIENT
Start: 2025-02-14 | End: 2025-02-22

## 2025-02-13 RX ORDER — INSULIN LISPRO 100 [IU]/ML
8 INJECTION, SOLUTION INTRAVENOUS; SUBCUTANEOUS ONCE
Status: COMPLETED | OUTPATIENT
Start: 2025-02-13 | End: 2025-02-13

## 2025-02-13 RX ORDER — DEXTROSE MONOHYDRATE 100 MG/ML
INJECTION, SOLUTION INTRAVENOUS CONTINUOUS PRN
Status: DISCONTINUED | OUTPATIENT
Start: 2025-02-13 | End: 2025-02-13 | Stop reason: HOSPADM

## 2025-02-13 RX ORDER — INSULIN HUMAN 100 [IU]/ML
INJECTION, SUSPENSION SUBCUTANEOUS
Qty: 5 ADJUSTABLE DOSE PRE-FILLED PEN SYRINGE | Refills: 1 | Status: SHIPPED | OUTPATIENT
Start: 2025-02-13 | End: 2025-02-20

## 2025-02-13 RX ORDER — IPRATROPIUM BROMIDE AND ALBUTEROL SULFATE 2.5; .5 MG/3ML; MG/3ML
1 SOLUTION RESPIRATORY (INHALATION)
Status: DISCONTINUED | OUTPATIENT
Start: 2025-02-13 | End: 2025-02-13 | Stop reason: HOSPADM

## 2025-02-13 RX ORDER — GUAIFENESIN/DEXTROMETHORPHAN 100-10MG/5
5 SYRUP ORAL 3 TIMES DAILY PRN
Qty: 120 ML | Refills: 0 | Status: SHIPPED | OUTPATIENT
Start: 2025-02-13 | End: 2025-02-23

## 2025-02-13 RX ORDER — BENZONATATE 100 MG/1
100 CAPSULE ORAL 3 TIMES DAILY
Qty: 21 CAPSULE | Refills: 0 | Status: SHIPPED | OUTPATIENT
Start: 2025-02-13 | End: 2025-02-20

## 2025-02-13 RX ADMIN — IPRATROPIUM BROMIDE AND ALBUTEROL SULFATE 1 DOSE: .5; 3 SOLUTION RESPIRATORY (INHALATION) at 09:21

## 2025-02-13 RX ADMIN — IPRATROPIUM BROMIDE AND ALBUTEROL SULFATE 1 DOSE: .5; 3 SOLUTION RESPIRATORY (INHALATION) at 14:02

## 2025-02-13 RX ADMIN — INSULIN GLARGINE 32 UNITS: 100 INJECTION, SOLUTION SUBCUTANEOUS at 09:45

## 2025-02-13 RX ADMIN — PANTOPRAZOLE SODIUM 40 MG: 40 TABLET, DELAYED RELEASE ORAL at 07:17

## 2025-02-13 RX ADMIN — BENZONATATE 100 MG: 100 CAPSULE ORAL at 09:42

## 2025-02-13 RX ADMIN — ESCITALOPRAM OXALATE 5 MG: 10 TABLET ORAL at 09:42

## 2025-02-13 RX ADMIN — INSULIN LISPRO 8 UNITS: 100 INJECTION, SOLUTION INTRAVENOUS; SUBCUTANEOUS at 09:45

## 2025-02-13 RX ADMIN — PREDNISONE 60 MG: 20 TABLET ORAL at 09:42

## 2025-02-13 RX ADMIN — INSULIN LISPRO 4 UNITS: 100 INJECTION, SOLUTION INTRAVENOUS; SUBCUTANEOUS at 12:18

## 2025-02-13 RX ADMIN — INSULIN LISPRO 8 UNITS: 100 INJECTION, SOLUTION INTRAVENOUS; SUBCUTANEOUS at 12:18

## 2025-02-13 RX ADMIN — INSULIN LISPRO 4 UNITS: 100 INJECTION, SOLUTION INTRAVENOUS; SUBCUTANEOUS at 09:45

## 2025-02-13 RX ADMIN — SODIUM CHLORIDE, PRESERVATIVE FREE 10 ML: 5 INJECTION INTRAVENOUS at 09:46

## 2025-02-13 RX ADMIN — BENZONATATE 100 MG: 100 CAPSULE ORAL at 14:48

## 2025-02-13 NOTE — PLAN OF CARE
Problem: Chronic Conditions and Co-morbidities  Goal: Patient's chronic conditions and co-morbidity symptoms are monitored and maintained or improved  2/13/2025 0257 by Kamryn Valencia RN  Outcome: Progressing  2/13/2025 0256 by Kamryn Valencia RN  Outcome: Progressing  Flowsheets (Taken 2/12/2025 2204)  Care Plan - Patient's Chronic Conditions and Co-Morbidity Symptoms are Monitored and Maintained or Improved: Monitor and assess patient's chronic conditions and comorbid symptoms for stability, deterioration, or improvement     Problem: Discharge Planning  Goal: Discharge to home or other facility with appropriate resources  2/13/2025 0257 by Kamryn Valencia RN  Outcome: Progressing  2/13/2025 0256 by Kamryn Valencia RN  Outcome: Progressing  Flowsheets (Taken 2/12/2025 2204)  Discharge to home or other facility with appropriate resources: Identify barriers to discharge with patient and caregiver     Problem: ABCDS Injury Assessment  Goal: Absence of physical injury  2/13/2025 0257 by Kamryn Valencia RN  Outcome: Progressing  2/13/2025 0256 by Kamryn Valencia RN  Outcome: Progressing     Problem: Pain  Goal: Verbalizes/displays adequate comfort level or baseline comfort level  Outcome: Progressing

## 2025-02-13 NOTE — ED NOTES
TRANSFER - OUT REPORT:    Verbal report given to ADONIS Aguila on Ava Hollis  being transferred to [facility] for routine progression of patient care       Report consisted of patient's Situation, Background, Assessment and   Recommendations(SBAR).     Information from the following report(s) Nurse Handoff Report, ED Encounter Summary, ED SBAR, Adult Overview, MAR, and Recent Results was reviewed with the receiving nurse.    Cunningham Fall Assessment:    Presents to emergency department  because of falls (Syncope, seizure, or loss of consciousness): No  Age > 70: No  Altered Mental Status, Intoxication with alcohol or substance confusion (Disorientation, impaired judgment, poor safety awaremess, or inability to follow instructions): No  Impaired Mobility: Ambulates or transfers with assistive devices or assistance; Unable to ambulate or transer.: No  Nursing Judgement: No          Lines:   Peripheral IV 02/12/25 Right Antecubital (Active)   Site Assessment Clean, dry & intact 02/12/25 1248   Line Status Blood return noted 02/12/25 1248   Phlebitis Assessment No symptoms 02/12/25 1248   Infiltration Assessment 0 02/12/25 1248   Dressing Status New dressing applied;Clean, dry & intact 02/12/25 1248   Dressing Type Transparent 02/12/25 1248   Dressing Intervention New 02/12/25 1248        Opportunity for questions and clarification was provided.      Patient transported with:  Monitor and Tech

## 2025-02-13 NOTE — PROGRESS NOTES
Discharge Summary   Please note that this dictation was completed with [employer], the computer voice recognition software.  Quite often unanticipated grammatical, syntax, homophones, and other interpretive errors are inadvertently transcribed by the computer software.  Please disregard these errors.  Please excuse any errors that have escaped final proofreading.    PATIENT ID: Ava Hollis  MRN: [id number]   YOB: 1975    DATE OF ADMISSION: 2/12/2025  2:40 PM    DATE OF DISCHARGE: 2/13/2025  PRIMARY CARE PROVIDER: Adonis Frazier MD         ATTENDING PHYSICIAN: JOSÉ MIGUEL PENALOZA MD  DISCHARGING PROVIDER: JOSÉ MIGUEL PENALOZA MD       CONSULTATIONS: IP CONSULT TO PULMONOLOGY  IP CONSULT TO PULMONOLOGY  IP CONSULT TO DIABETES MANAGEMENT  IP CONSULT TO CASE MANAGEMENT    PROCEDURES/SURGERIES: * No surgery found *    ADMITTING HPI from excerpted H&P   49 y.o.  female with a past medical history of numerous pneumothorax due to blebs, type 1 diabetes, GERD who is admitted with pleural effusion with associated pneumothorax.  Ms. Hollis states that over the last week she has been feeling short of breath once her symptoms worsening today.  She also endorses cough.  States her highest 99.  She endorses some nausea but no vomiting.  Of note, she states that she had the flu last week and completed a course of Tamiflu.         HOSPITAL COURSE & DISCHARGE DIAGNOSIS/ PLAN:       Acute exacerbation of asthma  Recent influenza infection  Small loculated right-sided pleural effusion  Trace right-sided pneumothorax  History of blebs  History of recurrent pneumothorax on the right side status post pleurodesis  Acute findings noted on admission on chest x-ray.  Pulmonology was consulted suspect that shortness of breath is due to asthma exacerbation of asthma due to flu virus.  Patient is very low risk of expanding right pneumothorax due to previous right-sided pleurodesis.  Repeat chest x-ray on the day of 
SURGICAL HISTORY  2009    ex lap, abdominal for endometriosis    OTHER SURGICAL HISTORY  3/21/2013    Bronch, RVATS, apical bleb rsxn,parietal pleurectomy    OTHER SURGICAL HISTORY  8/21/2013    Bronchoscopy, RVATS and talc pleurodesis      Prior to Admission medications    Medication Sig Start Date End Date Taking? Authorizing Provider   acetaminophen (TYLENOL) 500 MG tablet Take 2 tablets by mouth every 8 hours as needed for Fever or Pain 10/19/24   Dimitri Garcia MD   ketorolac (TORADOL) 10 MG tablet Take 1 tablet by mouth every 6 hours as needed for Pain 10/19/24   Dimitri Garcia MD   methocarbamol (ROBAXIN-750) 750 MG tablet Take 1 tablet by mouth 3 times daily as needed (pain) 7/21/24   Kofi Mazariegos MD   lidocaine 4 % external patch Place 1 patch onto the skin 2 times daily as needed (for pain) 7/21/24   Kofi Mazariegos MD   buPROPion (WELLBUTRIN) 75 MG tablet Take 1 tablet by mouth 2 times daily    Automatic Reconciliation, Ar   escitalopram (LEXAPRO) 5 MG tablet Take 1 tablet by mouth daily    Automatic Reconciliation, Ar   insulin aspart (NOVOLOG) 100 UNIT/ML injection pen Inject into the skin 3 times daily (before meals)    Automatic Reconciliation, Ar   Insulin Degludec 100 UNIT/ML SOPN Inject 32 Units into the skin    Automatic Reconciliation, Ar   lisinopril (PRINIVIL;ZESTRIL) 2.5 MG tablet Take 1 tablet by mouth daily    Automatic Reconciliation, Ar   omeprazole (PRILOSEC) 20 MG delayed release capsule Take 1 capsule by mouth daily as needed    Automatic Reconciliation, Ar   ondansetron (ZOFRAN) 4 MG tablet Take 1 tablet by mouth every 8 hours as needed 4/17/23   Automatic Reconciliation, Ar     No Known Allergies   Social History     Tobacco Use    Smoking status: Never    Smokeless tobacco: Never   Substance Use Topics    Alcohol use: Not Currently      Family History   Problem Relation Age of Onset    Diabetes Father     Heart Disease Father     Anemia Father     Hypertension Mother           Laboratory:

## 2025-02-13 NOTE — CONSULTS
[facility]     Name: Ava Hollis MRN: [id number]   : 1975 Hospital: [facility]   Date: 2025        Impression Plan   Pneumothorax  Asthma exacerbation  Recent flu infection  Hx of blebs  Hx of recurrent PTX on the right s/p pleuradesis               Pt has very low risk of expanding right pneumothorax on the right due to previous right sided pleurodesis. Suspect that SOB is due to asthma exaceration secondary to flu virus  Prednisone 60 mg daily with 8 day taper  Duonebs  CXR tomorrow AM  Will need follow up with [facility]         Radiology  ( personally reviewed) CTA chest: small right apicomedial PTX. Pleural thickening on right.    ABG Invalid input(s): \"PHI\", \"PO2I\", \"PCO2I\"       Subjective     Cc: shortness of breath     50 yo with PMHx of recurrent PTX on the right s/p bleb resection in 3/2013 and right sided pleuradesis 2013. Also has dx of mild intermittent asthma. Pt is presenting with progressive shortness of breath after having been diagnosed with flu over a week ago. Pt states that she recovered from the malaise but was noted by colleagues to be short of breath today. Pt endorses some wheezing. Minimal cough. Non-smoker. CT chest revealed known right sided pleural thickening from pleurodesis in the past and a small medial PTX that is new since .     Review of Systems:  Pertinent items are noted in HPI.    Past Medical History:   Diagnosis Date    Anemia     Anemia     Diabetes (HCC)     type 1    Dysmenorrhea     Endometriosis     GERD (gastroesophageal reflux disease)     Mitral valve prolapse     Sciatica     Spontaneous pneumothorax 3/2013 and 2013    recurrent right--total of 4 episodes, 2 required surgeries, above dates      Past Surgical History:   Procedure Laterality Date    DILATION AND CURETTAGE OF UTERUS  2018    OTHER SURGICAL HISTORY  2009    ex lap, abdominal for endometriosis    OTHER SURGICAL HISTORY  3/21/2013    
hoping to get the pods within the next week. She will be discharged this afternoon.     Blood glucose pattern    Significant diabetes-related events over the past 24-72 hours  A1C not updated  Admission   Started on po steroids      Diabetes Discharge Plan   Medication:  Continue Novolog AC&HS according to your SS  Start NPH insulin pen; 10 units in evening and then 30 units in am with Prednisone 60, 50, and 40 units. 20 units in am with 40, 30, or 20 units. Then just continue with NPH 10 units bid until you are able to get back on Omnipod.     Referral  []        Outpatient diabetes education   Additional orders  Follow up with endo          Initial Presentation   Ava Hollis is a 49 y.o. female who presented to the ED on 2/12 with SOB and cough.  LAB: Bg 398, A1c not updated  CXR:No acute process. Stable chronic right pleural changes.   CT:MPRESSION:  There is no pulmonary embolism.  There is no aortic aneurysm.  Trace right-sided pneumothorax.     Small associated loculated right-sided pleural effusion is slightly increased  compared to the 2020 examination. Right-sided up to 12 mm pulmonary nodules.    HX:   Past Medical History:   Diagnosis Date    Anemia     Anemia     Diabetes (HCC)     type 1    Dysmenorrhea     Endometriosis     GERD (gastroesophageal reflux disease)     Mitral valve prolapse     Sciatica     Spontaneous pneumothorax 3/2013 and 8/2013    recurrent right--total of 4 episodes, 2 required surgeries, above dates        INITIAL DX: Pleural effusion [J90]  Pneumothorax [J93.9]  Pneumothorax, unspecified type [J93.9]  Dyspnea, unspecified type [R06.00]     Current Treatment     TX: steroid, insulin,     Hospital Course   2/12 admitted with pleural effusion and pneumothorax    Diabetes History   Type Diabetes: Type 1  Ambulatory BG management provided by: Patrick Aviles  Family History:    Lab Results   Component Value Date    LABA1C 9.5 (H) 07/16/2019     Diabetes-related Medical

## 2025-02-13 NOTE — CARE COORDINATION
Care Management Discharge Note:      02/13/25 1142   Discharge Planning   Patient expects to be discharged to: House   Services At/After Discharge   Transition of Care Consult (CM Consult) DME/Supply Assistance  (needs neb)   Services At/After Discharge DME   Mode of Transport at Discharge Other (see comment)  (family)   Confirm Follow Up Transport Self   Condition of Participation: Discharge Planning   The Patient and/or Patient Representative was provided with a Choice of Provider? Patient   The Patient and/Or Patient Representative agree with the Discharge Plan? Yes   Freedom of Choice list was provided with basic dialogue that supports the patient's individualized plan of care/goals, treatment preferences, and shares the quality data associated with the providers?  Yes     Patient with dc orders and order for neb. CM spoke with patient no preference as to DME company, sent to [email or web address]. If approved will pull from [employer]. Patient to dc home with family to transport.   ______________________  Jeane CURTIS, RN  Care Management  2/13/2025    Care Management Progress Note:   [facility] accepted. Neb pulled from closet and delivered to patient at bedside. Delivery ticket uploaded to [email or web address] via [employer]. Patient will dc home, family to transport.

## 2025-02-13 NOTE — CARE COORDINATION
Care Management Initial Assessment  2/13/2025 9:06 AM  If patient is discharged prior to next notation, then this note serves as note for discharge by case management.    Reason for Admission:   Pleural effusion [J90]  Pneumothorax [J93.9]  Pneumothorax, unspecified type [J93.9]  Dyspnea, unspecified type [R06.00]         Patient Admission Status: Observation  Date Admitted to INP: n/a  RUR: Readmission Risk Score: 6.9    Hospitalization in the last 30 days (Readmission):  No        Advance Care Planning:  Code Status: Full Code  Primary Healthcare Decision Maker: (P) Legal Next of Kin   Advance Directive: has NO advanced directive - not interested in additional information     __________________________________________________________________________  Assessment:      02/13/25 0906   Service Assessment   Patient Orientation Alert and Oriented   Cognition Alert   History Provided By Medical Record   Support Systems Spouse/Significant Other;Children   Patient's Healthcare Decision Maker is: Legal Next of Kin   Prior Functional Level Independent in ADLs/IADLs   Current Functional Level Independent in ADLs/IADLs   Discharge Planning   Patient expects to be discharged to: House   Services At/After Discharge   Mode of Transport at Discharge Self   Confirm Follow Up Transport Self     Comments: Patient in Obs. At time of chart review, no anticipated needs and no CM consult. Please consult CM should any needs arise.     Discharge Concerns: []Yes [x]No []Unknown   Describe:    Financial concerns/barriers: []Yes, explain: []No [x]Unknown/Not discussed  __________________________________________________________________________    Insurer:   Active Insurance as of 2/12/2025       Primary Coverage       Payor Plan Insurance Group Employer/Plan Group    [employer] [id number]       Payor Plan Address Payor Plan Phone Number Payor Plan Fax Number Effective Dates    [address] [address]   1/1/2023 - None Entered

## 2025-02-14 LAB
EKG ATRIAL RATE: 103 BPM
EKG DIAGNOSIS: NORMAL
EKG P AXIS: 68 DEGREES
EKG P-R INTERVAL: 128 MS
EKG Q-T INTERVAL: 334 MS
EKG QRS DURATION: 68 MS
EKG QTC CALCULATION (BAZETT): 437 MS
EKG R AXIS: 29 DEGREES
EKG T AXIS: -14 DEGREES
EKG VENTRICULAR RATE: 103 BPM

## 2025-03-11 ENCOUNTER — APPOINTMENT (OUTPATIENT)
Facility: HOSPITAL | Age: 50
End: 2025-03-11
Payer: COMMERCIAL

## 2025-03-11 ENCOUNTER — HOSPITAL ENCOUNTER (EMERGENCY)
Facility: HOSPITAL | Age: 50
Discharge: HOME | End: 2025-03-11
Attending: EMERGENCY MEDICINE
Payer: COMMERCIAL

## 2025-03-11 VITALS
HEIGHT: 63 IN | HEART RATE: 105 BPM | TEMPERATURE: 98.1 F | DIASTOLIC BLOOD PRESSURE: 70 MMHG | SYSTOLIC BLOOD PRESSURE: 148 MMHG | OXYGEN SATURATION: 98 % | BODY MASS INDEX: 22.15 KG/M2 | WEIGHT: 125 LBS | RESPIRATION RATE: 16 BRPM
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
## 2025-03-11 DIAGNOSIS — M25.561 ACUTE PAIN OF RIGHT KNEE: Primary | ICD-10-CM

## 2025-03-11 PROCEDURE — 99283 EMERGENCY DEPT VISIT LOW MDM: CPT

## 2025-03-11 PROCEDURE — 73562 X-RAY EXAM OF KNEE 3: CPT

## 2025-03-11 RX ORDER — IBUPROFEN 600 MG/1
600 TABLET, FILM COATED ORAL EVERY 6 HOURS PRN
Qty: 120 TABLET | Refills: 0 | Status: SHIPPED | OUTPATIENT
Start: 2025-03-11

## 2025-03-11 ASSESSMENT — PAIN - FUNCTIONAL ASSESSMENT: PAIN_FUNCTIONAL_ASSESSMENT: 0-10

## 2025-03-11 ASSESSMENT — PAIN SCALES - GENERAL
PAINLEVEL_OUTOF10: 6
PAINLEVEL_OUTOF10: 6

## 2025-03-11 NOTE — DISCHARGE INSTRUCTIONS
Today your x-rays did not show an acute fracture or dislocation of your knee.  Please elevate your knee, ice, you may use the compression brace you have, 600 mg of ibuprofen every 6 hours as needed for anti-inflammatory properties and pain.  If you develop any new, concerning symptoms you may return for reevaluation.  Otherwise if your pain is to persist over the next 1 to 2 weeks despite the interventions you provide at home you may follow-up with your primary care doctor.

## 2025-03-11 NOTE — ED NOTES
Patient ambulatory at discharge with discharge instructions reviewed and opportunity to answer questions given. Prescriptions sent to preferred pharmacy.

## 2025-03-11 NOTE — ED TRIAGE NOTES
Pt arrives to the ER for complaints of a slip and fall after the floors were mopped.     Pt reports that she has right knee pain after the fall.     Denies taking any blood thinners or hitting head.     Denies taking any medication PTA.

## 2025-03-11 NOTE — ED PROVIDER NOTES
[facility] EMERGENCY DEPARTMENT  EMERGENCY DEPARTMENT ENCOUNTER      Pt Name: Ava Hollis  MRN: [id number]  Birthdate 1975  Date of evaluation: 3/11/2025  Provider: Christopher Driver PA-C    CHIEF COMPLAINT       Chief Complaint   Patient presents with    Knee Pain         HISTORY OF PRESENT ILLNESS   (Location/Symptom, Timing/Onset, Context/Setting, Quality, Duration, Modifying Factors, Severity)  Note limiting factors.   HPI  49-year-old female who is presenting to the ED with chief complaint of knee pain.  She fell last night after slipping on a wet floor that her  had just mopped.  When she fell she fell onto both of her knees, she did brace her fall with her hands.  She reports that since she has had pain to her right knee.  Worsened with ambulating.  She is able to walk and bear weight.  She took Tylenol which did help.  Denies injury elsewhere, head injury, LOC.    Review of External Medical Records:     Nursing Notes were reviewed.    REVIEW OF SYSTEMS    (2-9 systems for level 4, 10 or more for level 5)     Review of Systems   Musculoskeletal:         Right knee pain       Except as noted above the remainder of the review of systems was reviewed and negative.       PAST MEDICAL HISTORY     Past Medical History:   Diagnosis Date    Anemia     Anemia     Diabetes (HCC)     type 1    Dysmenorrhea     Endometriosis     GERD (gastroesophageal reflux disease)     Mitral valve prolapse     Sciatica     Spontaneous pneumothorax 3/2013 and 8/2013    recurrent right--total of 4 episodes, 2 required surgeries, above dates         SURGICAL HISTORY       Past Surgical History:   Procedure Laterality Date    DILATION AND CURETTAGE OF UTERUS  09/20/2018    OTHER SURGICAL HISTORY  2009    ex lap, abdominal for endometriosis    OTHER SURGICAL HISTORY  3/21/2013    Bronch, RVATS, apical bleb rsxn,parietal pleurectomy    OTHER SURGICAL HISTORY  8/21/2013    Bronchoscopy, RVATS

## 2025-05-01 ENCOUNTER — APPOINTMENT (OUTPATIENT)
Facility: HOSPITAL | Age: 50
End: 2025-05-01
Payer: COMMERCIAL

## 2025-05-01 ENCOUNTER — HOSPITAL ENCOUNTER (EMERGENCY)
Facility: HOSPITAL | Age: 50
Discharge: HOME | End: 2025-05-01
Attending: EMERGENCY MEDICINE
Payer: COMMERCIAL

## 2025-05-01 VITALS
OXYGEN SATURATION: 98 % | BODY MASS INDEX: 22.82 KG/M2 | HEIGHT: 62 IN | RESPIRATION RATE: 16 BRPM | HEART RATE: 93 BPM | WEIGHT: 124 LBS | DIASTOLIC BLOOD PRESSURE: 84 MMHG | SYSTOLIC BLOOD PRESSURE: 135 MMHG | TEMPERATURE: 97.6 F

## 2025-05-01 DIAGNOSIS — J30.2 SEASONAL ALLERGIES: Primary | ICD-10-CM

## 2025-05-01 DIAGNOSIS — R51.9 ACUTE NONINTRACTABLE HEADACHE, UNSPECIFIED HEADACHE TYPE: ICD-10-CM

## 2025-05-01 PROCEDURE — 70450 CT HEAD/BRAIN W/O DYE: CPT

## 2025-05-01 PROCEDURE — 6370000000 HC RX 637 (ALT 250 FOR IP): Performed by: EMERGENCY MEDICINE

## 2025-05-01 PROCEDURE — 99284 EMERGENCY DEPT VISIT MOD MDM: CPT

## 2025-05-01 RX ORDER — CETIRIZINE HYDROCHLORIDE 10 MG/1
10 TABLET ORAL
Status: COMPLETED | OUTPATIENT
Start: 2025-05-01 | End: 2025-05-01

## 2025-05-01 RX ADMIN — CETIRIZINE HYDROCHLORIDE 10 MG: 10 TABLET, FILM COATED ORAL at 22:13

## 2025-05-01 ASSESSMENT — PAIN - FUNCTIONAL ASSESSMENT: PAIN_FUNCTIONAL_ASSESSMENT: 0-10

## 2025-05-01 ASSESSMENT — PAIN SCALES - GENERAL: PAINLEVEL_OUTOF10: 7

## 2025-05-02 NOTE — DISCHARGE INSTRUCTIONS
Avoid stressful situations.  Take medication for allergies as directed.  Follow-up primary care doctor in morning.  If symptoms get worse return to ED immediately.      Thank you for choosing our Emergency Department for your care.  It is our privilege to care for you in your time of need.  In the next several days, you may receive a survey via email or mailed to your home about your experience with our team.  We would greatly appreciate you taking a few minutes to complete the survey, as we use this information to learn what we have done well and what we could be doing better. Thank you for trusting us with your care!    Below you will find a list of your tests from today's visit.   Labs and Radiology Studies  No results found for this or any previous visit (from the past 12 hours).  CT Head W/O Contrast  Result Date: 5/1/2025  CLINICAL HISTORY: headache INDICATION: headache COMPARISON: None. CT dose reduction was achieved through use of a standardized protocol tailored for this examination and automatic exposure control for dose modulation. TECHNIQUE: Serial axial images with a collimation of 5 mm were obtained from the skull base through the vertex.  Serial axial images with a collimation of 5 mm were obtained from the skull base through the vertex.  Sagittal and coronal reformatted images also obtained.  FINDINGS: The sulci and ventricles are within normal limits for patient age. There is no evidence of an acute infarction, hemorrhage, or mass-effect. There is no evidence of midline shift or hydrocephalus. Posterior fossa structures are unremarkable. No extra-axial collections are seen. Mastoid air cells are well pneumatized and clear.  There is no evidence of depressed skull fractures of soft tissue swelling.     No acute intracranial process. There is no intracranial mass or hemorrhage. Electronically signed by NILA

## 2025-05-02 NOTE — ED NOTES
Patient stable at time of discharge. Reviewed discharge instructions and education. Patient verbalized understanding. Patient states no questions at this time.

## 2025-05-02 NOTE — ED TRIAGE NOTES
Patient reports a headache in the temporal region that began around 1800 tonight, she also reports itchy eyes and the headache radiates down her neck. Patient denies blurry vision, dizziness, chest pain, shortness of breath

## 2025-05-02 NOTE — ED PROVIDER NOTES
Discharge Note: The patient is stable for discharge home. The signs, symptoms, diagnosis, and discharge instructions have been discussed, understanding conveyed, and agreed upon. The patient is to follow up as recommended or return to ER should their symptoms worsen.      PATIENT REFERRED TO:  No follow-up provider specified.      DISCHARGE MEDICATIONS:     Medication List        CONTINUE taking these medications      Insulin Pen Needle 32G X 4 MM Misc  1 each by Does not apply route daily            ASK your doctor about these medications      acetaminophen 500 MG tablet  Commonly known as: TYLENOL  Take 2 tablets by mouth every 8 hours as needed for Fever or Pain     buPROPion 75 MG tablet  Commonly known as: WELLBUTRIN     escitalopram 5 MG tablet  Commonly known as: LEXAPRO     HumuLIN N KwikPen 100 UNIT/ML injection pen  Generic drug: insulin NPH  Inject 30 Units into the skin every morning for 5 days, THEN 20 Units every morning for 2 days. Once you off the Prednisone, you can continue with just NPH 10 units with breakfast and dinner. Please give yourself 10 unit nightly, starting tonight 2/13.  Start taking on: February 13, 2025     ibuprofen 600 MG tablet  Commonly known as: ADVIL;MOTRIN  Take 1 tablet by mouth every 6 hours as needed for Pain     insulin aspart 100 UNIT/ML injection pen  Commonly known as: NovoLOG     ipratropium 0.5 mg-albuterol 2.5 mg 0.5-2.5 (3) MG/3ML Soln nebulizer solution  Commonly known as: DUONEB  Use 4 times daily round the clock for 3 days. Can use every 6 hours as needed for wheezing/shortness of breath     ketorolac 10 MG tablet  Commonly known as: TORADOL  Take 1 tablet by mouth every 6 hours as needed for Pain     lidocaine 4 % external patch  Place 1 patch onto the skin 2 times daily as needed (for pain)     lisinopril 2.5 MG tablet  Commonly known as: PRINIVIL;ZESTRIL     methocarbamol 750 MG tablet  Commonly known as: Robaxin-750  Take 1 tablet by mouth 3 times

## 2025-05-04 ENCOUNTER — TRANSCRIBE ORDERS (OUTPATIENT)
Facility: HOSPITAL | Age: 50
End: 2025-05-04

## 2025-05-04 DIAGNOSIS — Z87.09 HISTORY OF PNEUMOTHORAX: Primary | ICD-10-CM

## 2025-05-09 ENCOUNTER — HOSPITAL ENCOUNTER (EMERGENCY)
Facility: HOSPITAL | Age: 50
Discharge: HOME | End: 2025-05-09
Attending: EMERGENCY MEDICINE
Payer: COMMERCIAL

## 2025-05-09 ENCOUNTER — APPOINTMENT (OUTPATIENT)
Facility: HOSPITAL | Age: 50
End: 2025-05-09
Payer: COMMERCIAL

## 2025-05-09 VITALS
HEIGHT: 62 IN | BODY MASS INDEX: 23 KG/M2 | OXYGEN SATURATION: 100 % | SYSTOLIC BLOOD PRESSURE: 164 MMHG | HEART RATE: 98 BPM | WEIGHT: 125 LBS | RESPIRATION RATE: 16 BRPM | DIASTOLIC BLOOD PRESSURE: 87 MMHG | TEMPERATURE: 97.3 F

## 2025-05-09 DIAGNOSIS — M79.675 TOE PAIN, LEFT: Primary | ICD-10-CM

## 2025-05-09 PROCEDURE — 99283 EMERGENCY DEPT VISIT LOW MDM: CPT

## 2025-05-09 PROCEDURE — 6370000000 HC RX 637 (ALT 250 FOR IP): Performed by: NURSE PRACTITIONER

## 2025-05-09 PROCEDURE — 94761 N-INVAS EAR/PLS OXIMETRY MLT: CPT

## 2025-05-09 PROCEDURE — 73630 X-RAY EXAM OF FOOT: CPT

## 2025-05-09 RX ORDER — IBUPROFEN 600 MG/1
600 TABLET, FILM COATED ORAL EVERY 6 HOURS PRN
Qty: 20 TABLET | Refills: 0 | Status: SHIPPED | OUTPATIENT
Start: 2025-05-09

## 2025-05-09 RX ORDER — IBUPROFEN 600 MG/1
600 TABLET, FILM COATED ORAL
Status: COMPLETED | OUTPATIENT
Start: 2025-05-09 | End: 2025-05-09

## 2025-05-09 RX ADMIN — IBUPROFEN 600 MG: 600 TABLET, FILM COATED ORAL at 12:29

## 2025-05-09 ASSESSMENT — PAIN DESCRIPTION - ORIENTATION: ORIENTATION: LEFT

## 2025-05-09 ASSESSMENT — ENCOUNTER SYMPTOMS
EYE PAIN: 0
SHORTNESS OF BREATH: 0
COLOR CHANGE: 1
NAUSEA: 0
EYE REDNESS: 0
VOMITING: 0
ABDOMINAL PAIN: 0
ABDOMINAL DISTENTION: 0

## 2025-05-09 ASSESSMENT — PAIN SCALES - GENERAL: PAINLEVEL_OUTOF10: 9

## 2025-05-09 ASSESSMENT — PAIN - FUNCTIONAL ASSESSMENT: PAIN_FUNCTIONAL_ASSESSMENT: 0-10

## 2025-05-09 ASSESSMENT — PAIN DESCRIPTION - DESCRIPTORS: DESCRIPTORS: ACHING

## 2025-05-09 ASSESSMENT — PAIN DESCRIPTION - LOCATION: LOCATION: TOE (COMMENT WHICH ONE)

## 2025-05-09 NOTE — ED PROVIDER NOTES
Head: Normocephalic.      Nose: Nose normal. No congestion.      Mouth/Throat:      Mouth: Mucous membranes are moist.      Pharynx: No posterior oropharyngeal erythema.   Eyes:      General:         Right eye: No discharge.         Left eye: No discharge.      Conjunctiva/sclera: Conjunctivae normal.      Pupils: Pupils are equal, round, and reactive to light.   Cardiovascular:      Rate and Rhythm: Normal rate and regular rhythm.      Pulses: Normal pulses.      Heart sounds: Normal heart sounds.   Pulmonary:      Effort: Pulmonary effort is normal.      Breath sounds: Normal breath sounds.   Abdominal:      Tenderness: There is no abdominal tenderness. There is no right CVA tenderness or left CVA tenderness.      Hernia: No hernia is present.   Musculoskeletal:         General: No swelling. Normal range of motion.      Cervical back: Normal range of motion and neck supple. No tenderness.   Skin:     General: Skin is warm and dry.      Findings: No erythema.   Neurological:      General: No focal deficit present.      Mental Status: She is alert and oriented to person, place, and time.      Sensory: No sensory deficit.      Motor: No weakness.   Psychiatric:         Mood and Affect: Mood normal.         Behavior: Behavior normal.         Thought Content: Thought content normal.         Judgment: Judgment normal.             EMERGENCY DEPARTMENT COURSE and DIFFERENTIAL DIAGNOSIS/MDM:   Vitals:  There were no vitals filed for this visit.      Medical Decision Making  Amount and/or Complexity of Data Reviewed  Radiology: ordered.    Risk  Prescription drug management.            REASSESSMENT          CONSULTS:  None    PROCEDURES:     Procedures    Labs Reviewed - No data to display    XR FOOT LEFT (MIN 3 VIEWS)   Final Result   No acute abnormality.      Electronically signed by Arpit Beyer3:  Results and findings have been communicated and explained thoroughly to patient and family members that are  ED upon further deterioration. Pt is ready to go home.    Katelyn Lopez DNP        (Please note that portions of this note were completed with a voice recognition program.  Efforts were made to edit the dictations but occasionally words are mis-transcribed.)             Katelyn Lopez, APRN - NP  05/18/25 1009

## 2025-05-13 ENCOUNTER — HOSPITAL ENCOUNTER (OUTPATIENT)
Facility: HOSPITAL | Age: 50
Discharge: HOME | End: 2025-05-16
Attending: INTERNAL MEDICINE
Payer: COMMERCIAL

## 2025-05-13 DIAGNOSIS — Z87.09 HISTORY OF PNEUMOTHORAX: ICD-10-CM

## 2025-05-13 PROCEDURE — 71250 CT THORAX DX C-: CPT

## 2025-05-28 ENCOUNTER — TRANSCRIBE ORDERS (OUTPATIENT)
Facility: HOSPITAL | Age: 50
End: 2025-05-28

## 2025-05-28 DIAGNOSIS — Z87.09 HISTORY OF PNEUMOTHORAX: Primary | ICD-10-CM

## 2025-06-21 ENCOUNTER — HOSPITAL ENCOUNTER (EMERGENCY)
Facility: HOSPITAL | Age: 50
Discharge: HOME | End: 2025-06-21
Attending: EMERGENCY MEDICINE
Payer: COMMERCIAL

## 2025-06-21 ENCOUNTER — APPOINTMENT (OUTPATIENT)
Facility: HOSPITAL | Age: 50
End: 2025-06-21
Payer: COMMERCIAL

## 2025-06-21 VITALS
TEMPERATURE: 97.7 F | BODY MASS INDEX: 23 KG/M2 | OXYGEN SATURATION: 98 % | DIASTOLIC BLOOD PRESSURE: 82 MMHG | HEIGHT: 62 IN | WEIGHT: 125 LBS | SYSTOLIC BLOOD PRESSURE: 153 MMHG | RESPIRATION RATE: 16 BRPM | HEART RATE: 83 BPM

## 2025-06-21 DIAGNOSIS — J06.9 VIRAL URI: ICD-10-CM

## 2025-06-21 DIAGNOSIS — S93.401A MODERATE RIGHT ANKLE SPRAIN, INITIAL ENCOUNTER: Primary | ICD-10-CM

## 2025-06-21 LAB
FLUAV RNA SPEC QL NAA+PROBE: NOT DETECTED
FLUBV RNA SPEC QL NAA+PROBE: NOT DETECTED
SARS-COV-2 RNA RESP QL NAA+PROBE: NOT DETECTED
SOURCE: NORMAL

## 2025-06-21 PROCEDURE — 73630 X-RAY EXAM OF FOOT: CPT

## 2025-06-21 PROCEDURE — 87636 SARSCOV2 & INF A&B AMP PRB: CPT

## 2025-06-21 PROCEDURE — 99284 EMERGENCY DEPT VISIT MOD MDM: CPT

## 2025-06-21 PROCEDURE — 73610 X-RAY EXAM OF ANKLE: CPT

## 2025-06-21 PROCEDURE — 6370000000 HC RX 637 (ALT 250 FOR IP): Performed by: EMERGENCY MEDICINE

## 2025-06-21 RX ORDER — IBUPROFEN 600 MG/1
600 TABLET, FILM COATED ORAL
Status: COMPLETED | OUTPATIENT
Start: 2025-06-21 | End: 2025-06-21

## 2025-06-21 RX ORDER — NAPROXEN 500 MG/1
500 TABLET ORAL 2 TIMES DAILY WITH MEALS
Qty: 60 TABLET | Refills: 0 | Status: SHIPPED | OUTPATIENT
Start: 2025-06-21

## 2025-06-21 RX ORDER — GUAIFENESIN 600 MG/1
600 TABLET, EXTENDED RELEASE ORAL 2 TIMES DAILY
Qty: 30 TABLET | Refills: 0 | Status: SHIPPED | OUTPATIENT
Start: 2025-06-21 | End: 2025-07-06

## 2025-06-21 RX ADMIN — IBUPROFEN 600 MG: 600 TABLET ORAL at 21:36

## 2025-06-21 ASSESSMENT — PAIN - FUNCTIONAL ASSESSMENT: PAIN_FUNCTIONAL_ASSESSMENT: 0-10

## 2025-06-21 ASSESSMENT — PAIN DESCRIPTION - LOCATION
LOCATION: FOOT
LOCATION: ANKLE

## 2025-06-21 ASSESSMENT — PAIN SCALES - GENERAL
PAINLEVEL_OUTOF10: 9
PAINLEVEL_OUTOF10: 9

## 2025-06-21 ASSESSMENT — PAIN DESCRIPTION - ORIENTATION
ORIENTATION: RIGHT
ORIENTATION: RIGHT

## 2025-06-21 ASSESSMENT — PAIN DESCRIPTION - DESCRIPTORS: DESCRIPTORS: ACHING

## 2025-06-22 NOTE — ED PROVIDER NOTES
[facility] EMERGENCY DEPARTMENT  EMERGENCY DEPARTMENT ENCOUNTER      Pt Name: Ava Hollis  MRN: [id number]  Birthdate 1975  Date of evaluation: 6/21/2025  Provider: Bandar Arrington MD    CHIEF COMPLAINT       Chief Complaint   Patient presents with    Ankle Pain    Nasal Congestion         HISTORY OF PRESENT ILLNESS   (Location/Symptom, Timing/Onset, Context/Setting, Quality, Duration, Modifying Factors, Severity)  Note limiting factors.   49-year-old female presents ER for evaluation for right ankle injury sustained after she tripped and fell this morning and heard a loud pop followed by pain and swelling.  The patient is complaining of fever with congestion, headache and general malaise.  She denies any nausea or vomiting, neck and back pain, chest pain, shortness of breath, dizziness, sick contacts or recent travel.            Review of External Medical Records:     Nursing Notes were reviewed.    REVIEW OF SYSTEMS    (2-9 systems for level 4, 10 or more for level 5)     Review of Systems   All other systems reviewed and are negative.      Except as noted above the remainder of the review of systems was reviewed and negative.       PAST MEDICAL HISTORY     Past Medical History:   Diagnosis Date    Anemia     Anemia     Diabetes (HCC)     type 1    Dysmenorrhea     Endometriosis     GERD (gastroesophageal reflux disease)     Mitral valve prolapse     Sciatica     Spontaneous pneumothorax 3/2013 and 8/2013    recurrent right--total of 4 episodes, 2 required surgeries, above dates         SURGICAL HISTORY       Past Surgical History:   Procedure Laterality Date    DILATION AND CURETTAGE OF UTERUS  09/20/2018    OTHER SURGICAL HISTORY  2009    ex lap, abdominal for endometriosis    OTHER SURGICAL HISTORY  3/21/2013    Bronch, RVATS, apical bleb rsxn,parietal pleurectomy    OTHER SURGICAL HISTORY  8/21/2013    Bronchoscopy, RVATS and talc pleurodesis         CURRENT MEDICATIONS

## 2025-06-22 NOTE — ED TRIAGE NOTES
Pt arrives with c/o right ankle pain.  Pt states she was working in the yard when she stepped in a hole and heard a pop.    Pt endorses sinus congestion x 4 days.    MD Arrington assessing in triage.

## 2025-06-25 ENCOUNTER — HOSPITAL ENCOUNTER (EMERGENCY)
Facility: HOSPITAL | Age: 50
Discharge: HOME | End: 2025-06-25
Attending: EMERGENCY MEDICINE
Payer: COMMERCIAL

## 2025-06-25 VITALS
WEIGHT: 128.53 LBS | TEMPERATURE: 98.1 F | HEART RATE: 88 BPM | HEIGHT: 62 IN | OXYGEN SATURATION: 99 % | BODY MASS INDEX: 23.65 KG/M2 | RESPIRATION RATE: 18 BRPM | SYSTOLIC BLOOD PRESSURE: 142 MMHG | DIASTOLIC BLOOD PRESSURE: 87 MMHG

## 2025-06-25 DIAGNOSIS — S99.911S ANKLE INJURY, RIGHT, SEQUELA: Primary | ICD-10-CM

## 2025-06-25 PROCEDURE — 99282 EMERGENCY DEPT VISIT SF MDM: CPT

## 2025-06-25 ASSESSMENT — PAIN SCALES - GENERAL: PAINLEVEL_OUTOF10: 4

## 2025-06-25 ASSESSMENT — PAIN DESCRIPTION - ORIENTATION: ORIENTATION: RIGHT

## 2025-06-25 ASSESSMENT — PAIN - FUNCTIONAL ASSESSMENT: PAIN_FUNCTIONAL_ASSESSMENT: 0-10

## 2025-06-25 ASSESSMENT — PAIN DESCRIPTION - LOCATION: LOCATION: FOOT

## 2025-06-26 NOTE — ED PROVIDER NOTES
[facility] EMERGENCY DEPARTMENT  EMERGENCY DEPARTMENT ENCOUNTER      Pt Name: Ava Hollis  MRN: [id number]  Birthdate 1975  Date of evaluation: 6/25/2025  Provider: Heladio Mueller PA-C    CHIEF COMPLAINT       Chief Complaint   Patient presents with    Foot Injury         HISTORY OF PRESENT ILLNESS    Patient is a 49-year-old female with history of anemia, type 1 diabetes, endometriosis, GERD, dysmenorrhea, and spontaneous pneumothorax who presents emergency room with reports of right foot swelling and bruising since her injury on Saturday.  Patient was seen in this emergency room on 6/21 and reports x-rays were unremarkable.  Patient reports over the past few days she has been on her feet 10 to 12 hours a day at work.  Patient reports she just had concerns with the continued swelling and bruising.  Patient has not followed up with [facility]. Patient denies chest pain, shortness of breath, abdominal pain, urinary symptoms, nausea or vomiting, diarrhea or constipation, headache, dizziness, lightheadedness, fever or chills.  Patient denies alcohol use, denies smoking/vaping or illicit drug use.          Nursing Notes were reviewed.    REVIEW OF SYSTEMS       Review of Systems      PAST MEDICAL HISTORY     Past Medical History:   Diagnosis Date    Anemia     Anemia     Diabetes (HCC)     type 1    Dysmenorrhea     Endometriosis     GERD (gastroesophageal reflux disease)     Mitral valve prolapse     Sciatica     Spontaneous pneumothorax 3/2013 and 8/2013    recurrent right--total of 4 episodes, 2 required surgeries, above dates         SURGICAL HISTORY       Past Surgical History:   Procedure Laterality Date    DILATION AND CURETTAGE OF UTERUS  09/20/2018    OTHER SURGICAL HISTORY  2009    ex lap, abdominal for endometriosis    OTHER SURGICAL HISTORY  3/21/2013    Bronch, RVATS, apical bleb rsxn,parietal pleurectomy    OTHER SURGICAL HISTORY  8/21/2013    Bronchoscopy, RVATS and

## 2025-06-26 NOTE — DISCHARGE INSTRUCTIONS
Discussed visit today.  You were seen in the emergency room for swelling and bruising of your right ankle.  Discussed no repeat imaging at this time.  If you develop any calf pain please return to the emergency room.  Discussed elevation and rest over the next few days at work.  He is wear the boot over the next week and if you are not feeling any better and still having pain please follow-up with [facility].    Return to the emergency room with any worsening of symptoms.

## 2025-06-26 NOTE — ED TRIAGE NOTES
Pt amb to ED for c/o worsening rt foot swelling since presenting Saturday for a foot injury. States xrays were negative for fractures.  In triage, swelling noted to rt foot, skin WNL, no redness/warmth noted.

## (undated) DEVICE — CLICKLINE SCISSORS INSERT: Brand: CLICKLINE

## (undated) DEVICE — TROCAR: Brand: KII SLEEVE

## (undated) DEVICE — Z DISCONTINUED NO SUB IDED TROCAR LAP DIA8MM STD LEN BLDELSS TAPR TIP THRD N OPT VW

## (undated) DEVICE — 3000CC GUARDIAN II: Brand: GUARDIAN

## (undated) DEVICE — COVER LT HNDL BLU PLAS

## (undated) DEVICE — KENDALL SCD EXPRESS SLEEVES, KNEE LENGTH, MEDIUM: Brand: KENDALL SCD

## (undated) DEVICE — KIT INFECTION CTRL ST FRAN --

## (undated) DEVICE — SOL IRRIGATION INJ NACL 0.9% 500ML BTL

## (undated) DEVICE — DRAPE,REIN 53X77,STERILE: Brand: MEDLINE

## (undated) DEVICE — SUTURE SZ 0 27IN 5/8 CIR UR-6  TAPER PT VIOLET ABSRB VICRYL J603H

## (undated) DEVICE — STERILE POLYISOPRENE POWDER-FREE SURGICAL GLOVES: Brand: PROTEXIS

## (undated) DEVICE — SURGICAL PROCEDURE KIT GEN LAPAROSCOPY LF

## (undated) DEVICE — DEVICE TRNSF SPIK STL 2008S] MICROTEK MEDICAL INC]

## (undated) DEVICE — SUTURE MCRYL SZ 4-0 L18IN ABSRB UD L19MM PS-2 3/8 CIR PRIM Y496G

## (undated) DEVICE — DERMABOND SKIN ADH 0.7ML -- DERMABOND ADVANCED 12/BX

## (undated) DEVICE — NEEDLE HYPO 22GA L1.5IN BLK S STL HUB POLYPR SHLD REG BVL

## (undated) DEVICE — TRAY CATH OD16FR SIL URIN M STATLOK STBL DEV SURSTP

## (undated) DEVICE — (D)PREP SKN CHLRAPRP APPL 26ML -- CONVERT TO ITEM 371833

## (undated) DEVICE — TROCAR SITE CLOSURE DEVICE: Brand: ENDO CLOSE

## (undated) DEVICE — TUBING INSUFLTN 10FT LUER -- CONVERT TO ITEM 368568

## (undated) DEVICE — REM POLYHESIVE ADULT PATIENT RETURN ELECTRODE: Brand: VALLEYLAB